# Patient Record
Sex: FEMALE | Race: WHITE | NOT HISPANIC OR LATINO | Employment: UNEMPLOYED | URBAN - METROPOLITAN AREA
[De-identification: names, ages, dates, MRNs, and addresses within clinical notes are randomized per-mention and may not be internally consistent; named-entity substitution may affect disease eponyms.]

---

## 2020-01-06 ENCOUNTER — OFFICE VISIT (OUTPATIENT)
Dept: ENDOCRINOLOGY | Facility: CLINIC | Age: 45
End: 2020-01-06

## 2020-01-06 VITALS
DIASTOLIC BLOOD PRESSURE: 100 MMHG | WEIGHT: 184.2 LBS | SYSTOLIC BLOOD PRESSURE: 150 MMHG | OXYGEN SATURATION: 99 % | HEIGHT: 61 IN | BODY MASS INDEX: 34.78 KG/M2 | HEART RATE: 98 BPM

## 2020-01-06 DIAGNOSIS — Z79.4 TYPE 2 DIABETES MELLITUS WITH OTHER SPECIFIED COMPLICATION, WITH LONG-TERM CURRENT USE OF INSULIN (HCC): Primary | ICD-10-CM

## 2020-01-06 DIAGNOSIS — E78.2 MIXED HYPERLIPIDEMIA: ICD-10-CM

## 2020-01-06 DIAGNOSIS — E11.69 TYPE 2 DIABETES MELLITUS WITH OTHER SPECIFIED COMPLICATION, WITH LONG-TERM CURRENT USE OF INSULIN (HCC): Primary | ICD-10-CM

## 2020-01-06 DIAGNOSIS — E11.319 DIABETIC RETINOPATHY ASSOCIATED WITH TYPE 2 DIABETES MELLITUS, MACULAR EDEMA PRESENCE UNSPECIFIED, UNSPECIFIED LATERALITY, UNSPECIFIED RETINOPATHY SEVERITY (HCC): ICD-10-CM

## 2020-01-06 DIAGNOSIS — Z72.0 TOBACCO ABUSE: ICD-10-CM

## 2020-01-06 PROBLEM — I10 ESSENTIAL HYPERTENSION: Status: ACTIVE | Noted: 2020-01-06

## 2020-01-06 LAB
GLUCOSE BLDC GLUCOMTR-MCNC: 442 MG/DL (ref 70–130)
HBA1C MFR BLD: 12 %

## 2020-01-06 PROCEDURE — 82947 ASSAY GLUCOSE BLOOD QUANT: CPT | Performed by: INTERNAL MEDICINE

## 2020-01-06 PROCEDURE — 83036 HEMOGLOBIN GLYCOSYLATED A1C: CPT | Performed by: INTERNAL MEDICINE

## 2020-01-06 PROCEDURE — 99244 OFF/OP CNSLTJ NEW/EST MOD 40: CPT | Performed by: INTERNAL MEDICINE

## 2020-01-06 RX ORDER — INSULIN GLARGINE 100 [IU]/ML
145 INJECTION, SOLUTION SUBCUTANEOUS
COMMUNITY
Start: 2019-12-11 | End: 2020-01-06 | Stop reason: SDUPTHER

## 2020-01-06 RX ORDER — ESTROGEN,CON/M-PROGEST ACET 0.625-2.5
TABLET ORAL
Status: ON HOLD | COMMUNITY
Start: 2019-12-11 | End: 2022-10-22

## 2020-01-06 RX ORDER — ALBUTEROL SULFATE 90 UG/1
AEROSOL, METERED RESPIRATORY (INHALATION)
Refills: 0 | COMMUNITY
Start: 2019-11-01

## 2020-01-06 RX ORDER — FLASH GLUCOSE SENSOR
1 KIT MISCELLANEOUS ONCE
Qty: 1 DEVICE | Refills: 0 | Status: SHIPPED | OUTPATIENT
Start: 2020-01-06 | End: 2020-01-06

## 2020-01-06 RX ORDER — INSULIN LISPRO 100 U/ML
INJECTION, SOLUTION SUBCUTANEOUS
COMMUNITY
Start: 2019-12-12 | End: 2020-01-06 | Stop reason: SDUPTHER

## 2020-01-06 RX ORDER — OMEPRAZOLE 20 MG/1
CAPSULE, DELAYED RELEASE ORAL
Refills: 0 | Status: ON HOLD | COMMUNITY
Start: 2019-11-01 | End: 2022-10-24 | Stop reason: SDUPTHER

## 2020-01-06 RX ORDER — FENOFIBRATE 160 MG/1
TABLET ORAL
COMMUNITY
Start: 2019-12-11 | End: 2023-04-04

## 2020-01-06 RX ORDER — CYCLOBENZAPRINE HCL 10 MG
TABLET ORAL
COMMUNITY
Start: 2019-12-12 | End: 2022-11-03 | Stop reason: HOSPADM

## 2020-01-06 RX ORDER — INSULIN GLARGINE 100 [IU]/ML
50 INJECTION, SOLUTION SUBCUTANEOUS NIGHTLY
Qty: 6 PEN | Refills: 3 | Status: SHIPPED | OUTPATIENT
Start: 2020-01-06 | End: 2020-06-15

## 2020-01-06 RX ORDER — INSULIN LISPRO 100 U/ML
INJECTION, SOLUTION SUBCUTANEOUS
Qty: 8 PEN | Refills: 3 | Status: SHIPPED | OUTPATIENT
Start: 2020-01-06 | End: 2020-06-15 | Stop reason: SDUPTHER

## 2020-01-06 RX ORDER — FLASH GLUCOSE SENSOR
1 KIT MISCELLANEOUS
Qty: 2 EACH | Refills: 6 | Status: SHIPPED | OUTPATIENT
Start: 2020-01-06 | End: 2020-06-15

## 2020-01-06 RX ORDER — AMLODIPINE BESYLATE 5 MG/1
TABLET ORAL DAILY
Status: ON HOLD | COMMUNITY
Start: 2019-12-11 | End: 2022-10-24 | Stop reason: SDUPTHER

## 2020-01-06 RX ORDER — LANCETS 30 GAUGE
EACH MISCELLANEOUS
Refills: 0 | COMMUNITY
Start: 2019-11-01 | End: 2022-12-15 | Stop reason: SDUPTHER

## 2020-01-06 RX ORDER — GABAPENTIN 600 MG/1
TABLET ORAL
Status: ON HOLD | COMMUNITY
Start: 2019-12-12 | End: 2022-10-22

## 2020-01-06 RX ORDER — LOSARTAN POTASSIUM 50 MG/1
TABLET ORAL DAILY
Status: ON HOLD | COMMUNITY
Start: 2019-12-11 | End: 2022-10-24 | Stop reason: SDUPTHER

## 2020-01-06 RX ORDER — DICLOFENAC SODIUM 75 MG/1
TABLET, DELAYED RELEASE ORAL
Refills: 0 | Status: ON HOLD | COMMUNITY
Start: 2019-11-01 | End: 2022-10-22

## 2020-01-06 NOTE — PATIENT INSTRUCTIONS
Take basaglar 50 units once daily at night.     Take admelog 25 units 15 minutes before meals plus extra if your blood sugar is high.   If BG is 150-199: extra 3 units  -249: extra 6 units  -299: extra 9 units  -349: extra 12 units  +: extra 15 units.     Check your glucose at least 3 times daily. You can take insulin every 4 hours (enough to cover the meal - 25 units - if you're eating plus sliding scale if high; if you are not eating and BG is high, take only the sliding scale).     If in a few weeks your BGs are consistently > 200, call the office.

## 2020-01-06 NOTE — PROGRESS NOTES
Chief Complaint   Patient presents with   • Diabetes     New patient here today with type 2 diabetes        Referring Provider  Braulio Berg MD     HPI   Rachana Patrick is a 44 y.o. female had concerns including Diabetes (New patient here today with type 2 diabetes).    Diabetes was diagnosed 2010.  Complications include retinopathy, neuropathy.   Last ophtho exam was last August.   She doesn't tolerate metformin due to GI issues.   Current medications for diabetes include basaglar, admelog.  She hasn't taken any insulin for the last two weeks. She is worried about taking the prescribed dose therefore she doesn't take it. She takes a max of 45 units of basaglar when she does take it.   She took admelog last yesterday. BG today is 442 - she would typically take 30 units of insulin.   She monitors her blood sugar rarely, maybe once per day at times. Her BGs range 300s-HI.     She needed some intervention for retinopathy but she was afraid to go back and so hasn't. She had to quit her job due to vision issues.   Neuropathy is painful daily.     Last labs were done within a year.   She is not currently on a statin. Doesn't recall if she has been. Is on fenofibrate. Her brother had an MI at age 43 and her mother had an MI age 37.   She had GDM at age 14.    She is working on quitting smoking. Has cut back from 2 ppd.     Past Medical History:   Diagnosis Date   • Hyperlipidemia    • Hypertension    • Type 2 diabetes mellitus (CMS/HCC)      History reviewed. No pertinent surgical history.   Family History   Problem Relation Age of Onset   • Diabetes Mother    • Hypertension Mother    • Heart attack Mother    • Diabetes Father    • Hypertension Father       Social History     Socioeconomic History   • Marital status: Unknown     Spouse name: Not on file   • Number of children: Not on file   • Years of education: Not on file   • Highest education level: Not on file   Tobacco Use   • Smoking status: Current Every Day  "Smoker     Packs/day: 0.50   • Smokeless tobacco: Never Used   Substance and Sexual Activity   • Alcohol use: Yes     Comment: social   • Drug use: Never   • Sexual activity: Defer      No Known Allergies   Current Outpatient Medications on File Prior to Visit   Medication Sig Dispense Refill   • albuterol sulfate  (90 Base) MCG/ACT inhaler INHALE 2 PUFFS PO Q 6 H PRN  0   • amLODIPine (NORVASC) 5 MG tablet Take  by mouth Daily.     • cyclobenzaprine (FLEXERIL) 10 MG tablet TK 1 T PO HS PRN     • diclofenac (VOLTAREN) 75 MG EC tablet TK 1 T PO  BID WF OR MILK  0   • fenofibrate 160 MG tablet TK 1 T PO QD WITH A MEAL     • gabapentin (NEURONTIN) 600 MG tablet TK 1 T PO TID     • Lancets (ONETOUCH DELICA PLUS [id number]) misc USE TO TEST TID AND PRN  0   • losartan (COZAAR) 50 MG tablet Take  by mouth Daily.     • omeprazole (priLOSEC) 20 MG capsule TK 1 C PO QD  0   • ONE TOUCH ULTRA TEST test strip USE TO TEST TID AND PRN     • PREMPRO 0.625-2.5 MG per tablet TK 2 TS PO QD     • [DISCONTINUED] ADMELOG SOLOSTAR 100 UNIT/ML solution pen-injector USE AS DIRECTED ON SLIDING SCALE. MAX OF 50 UNITS PER DAY AS DIRECTED     • [DISCONTINUED] Insulin Glargine (BASAGLAR KWIKPEN) 100 UNIT/ML injection pen 145 Units.       No current facility-administered medications on file prior to visit.         Review of Systems   Constitutional: Negative.    HENT: Negative.    Eyes: Negative.    Respiratory: Negative.    Cardiovascular: Negative.    Gastrointestinal: Negative.    Endocrine: Negative.    Genitourinary: Negative.    Musculoskeletal: Negative.    Skin: Negative.    Allergic/Immunologic: Negative.    Neurological: Negative.    Hematological: Negative.    Psychiatric/Behavioral: Negative.       ROS was reviewed and verified. All other ROS negative.    /100 (BP Location: Right arm, Patient Position: Sitting, Cuff Size: Adult)   Pulse 98   Ht 154.9 cm (61\")   Wt 83.6 kg (184 lb 3.2 oz)   SpO2 99%   Breastfeeding " No   BMI 34.80 kg/m²      Physical Exam    Rachana had a diabetic foot exam performed (Onychomycosis bilateral toes) today.   During the foot exam she had a monofilament test performed (1 out of 5 bilateral feet but significantly diminished).    Constitutional:  well developed; well nourished  no acute distress   ENT/Thyroid: no thyromegaly  no palpable nodules   Eyes: EOM intact  Conjunctiva: clear   Respiratory:  breathing is unlabored  clear to auscultation bilaterally   Cardiovascular:  regular rate and rhythm, S1, S2 normal, no murmur, click, rub or gallop   Chest:  Not performed.   Abdomen: Not performed.   : Not performed.   Musculoskeletal: negative findings:  ROM of all joints is normal, no deformities present   Skin: dry and warm   Neuro: normal without focal findings, mental status, speech normal, alert and oriented x3 and LOUIE   Psych: oriented to time, place and person, mood and affect are within normal limits     HbA1c:  Lab Results   Component Value Date    HGBA1C 12.0 01/06/2020     Glucose:  Lab Results   Component Value Date    POCGLU 442 (A) 01/06/2020     Assessment and Plan    Rachana was seen today for diabetes.    Diagnoses and all orders for this visit:    Type 2 diabetes mellitus with other specified complication, with long-term current use of insulin (CMS/[facility])  Uncontrolled with A1c up to 12.0 today.  BG in the office 442 and patient was given 20 units of fast acting insulin.  Complicated by neuropathy and retinopathy.  Patient has not been taking insulin as prescribed. Has never taken more than 45 units of Basaglar at night.  She is also taking her prandial insulin after meals rather than before.  Increase Basaglar from her usual 45 units at night to 50 units nightly.  Change Admelog to 25 units q. AC +3: 50 greater than 150 correction scale.  Check BG's 3-4 times daily and call the office if persistently greater than 180 for titration of insulin.  No metformin due to history of GI  intolerance.  Consider SGLT2 inhibitor in the future once her BG's are better controlled.  She is too high of a risk for UTI or yeast infection from this medication currently, but with her family history of early CAD, this would be a good medication to add to her regimen.  Her ophtho exam is up-to-date, but she needs intervention for diabetic retinopathy.  Refer for diabetes education.  -     POC Glucose  -     POC Glycosylated Hemoglobin (Hb A1C)  -     Insulin Glargine (BASAGLAR KWIKPEN) 100 UNIT/ML injection pen; Inject 50 Units under the skin into the appropriate area as directed Every Night.  -     ADMELOG SOLOSTAR 100 UNIT/ML solution pen-injector; 25 units three times daily before meals plus 3 extra units for every 50 points your BG is >150, max dose 40 units  -     Continuous Blood Gluc Sensor (treadalongSTYLE COLIN 14 DAY SENSOR) misc; 1 each Every 14 (Fourteen) Days.  -     Continuous Blood Gluc  (FREESTYLE COLIN READER) device; 1 each 1 (One) Time for 1 dose.  -     Comprehensive Metabolic Panel  -     TSH  -     Microalbumin / Creatinine Urine Ratio - Urine, Clean Catch  -     CBC (No Diff)  -     Ambulatory Referral to Diabetic Education    Diabetic retinopathy associated with type 2 diabetes mellitus, macular edema presence unspecified, unspecified laterality, unspecified retinopathy severity (CMS/HCC)  Is imperative that the patient get follow-up for diabetic retinopathy and treatment with hopes to preserve her vision and prevent progression of retinopathy. BG management as above.    Mixed hyperlipidemia  All type II diabetics should be on statin therapy due to increased CVD risk.  This patient has particularly high CVD risk due to early family history.  -     Lipid Panel    Tobacco abuse  Cessation advised.     Return in about 3 months (around 4/6/2020) for next scheduled follow up. The patient was instructed to contact the clinic with any interval questions or concerns.    Yuki Hughes, DO    Endocrinologist    Please note that portions of this document were completed with a voice recognition program. Efforts were made to edit the dictations, but occasionally words are mis-transcribed.

## 2020-06-15 ENCOUNTER — OFFICE VISIT (OUTPATIENT)
Dept: ENDOCRINOLOGY | Facility: CLINIC | Age: 45
End: 2020-06-15

## 2020-06-15 VITALS
HEIGHT: 61 IN | BODY MASS INDEX: 34.4 KG/M2 | SYSTOLIC BLOOD PRESSURE: 142 MMHG | DIASTOLIC BLOOD PRESSURE: 90 MMHG | HEART RATE: 102 BPM | WEIGHT: 182.2 LBS | OXYGEN SATURATION: 99 %

## 2020-06-15 DIAGNOSIS — Z79.4 TYPE 2 DIABETES MELLITUS WITH OTHER SPECIFIED COMPLICATION, WITH LONG-TERM CURRENT USE OF INSULIN (HCC): Primary | ICD-10-CM

## 2020-06-15 DIAGNOSIS — E66.09 CLASS 1 OBESITY DUE TO EXCESS CALORIES WITH SERIOUS COMORBIDITY AND BODY MASS INDEX (BMI) OF 34.0 TO 34.9 IN ADULT: ICD-10-CM

## 2020-06-15 DIAGNOSIS — E78.2 MIXED HYPERLIPIDEMIA: ICD-10-CM

## 2020-06-15 DIAGNOSIS — G47.00 INSOMNIA, UNSPECIFIED TYPE: ICD-10-CM

## 2020-06-15 DIAGNOSIS — E11.69 TYPE 2 DIABETES MELLITUS WITH OTHER SPECIFIED COMPLICATION, WITH LONG-TERM CURRENT USE OF INSULIN (HCC): Primary | ICD-10-CM

## 2020-06-15 PROBLEM — E66.811 CLASS 1 OBESITY DUE TO EXCESS CALORIES WITH SERIOUS COMORBIDITY AND BODY MASS INDEX (BMI) OF 34.0 TO 34.9 IN ADULT: Status: ACTIVE | Noted: 2020-06-15

## 2020-06-15 LAB
GLUCOSE BLDC GLUCOMTR-MCNC: 217 MG/DL (ref 70–130)
HBA1C MFR BLD: 12.7 %

## 2020-06-15 PROCEDURE — 82947 ASSAY GLUCOSE BLOOD QUANT: CPT | Performed by: INTERNAL MEDICINE

## 2020-06-15 PROCEDURE — 83036 HEMOGLOBIN GLYCOSYLATED A1C: CPT | Performed by: INTERNAL MEDICINE

## 2020-06-15 PROCEDURE — 99214 OFFICE O/P EST MOD 30 MIN: CPT | Performed by: INTERNAL MEDICINE

## 2020-06-15 RX ORDER — INSULIN LISPRO 100 U/ML
INJECTION, SOLUTION SUBCUTANEOUS
Qty: 40 PEN | Refills: 1 | Status: SHIPPED | OUTPATIENT
Start: 2020-06-15 | End: 2022-11-08

## 2020-06-15 RX ORDER — CETIRIZINE HYDROCHLORIDE 10 MG/1
TABLET ORAL DAILY
COMMUNITY
Start: 2020-05-20 | End: 2022-11-03 | Stop reason: HOSPADM

## 2020-06-15 RX ORDER — INSULIN GLARGINE 100 [IU]/ML
80 INJECTION, SOLUTION SUBCUTANEOUS NIGHTLY
Qty: 25 PEN | Refills: 1 | Status: SHIPPED | OUTPATIENT
Start: 2020-06-15 | End: 2020-06-15

## 2020-06-15 RX ORDER — INSULIN GLARGINE 100 [IU]/ML
60 INJECTION, SOLUTION SUBCUTANEOUS NIGHTLY
Qty: 25 PEN | Refills: 1 | Status: ON HOLD | OUTPATIENT
Start: 2020-06-15 | End: 2022-10-22

## 2020-06-15 NOTE — PROGRESS NOTES
Chief Complaint   Patient presents with   • Diabetes     Follow up Type 2 Diabetes        HPI   Rachana Patrick is a 45 y.o. female had concerns including Diabetes (Follow up Type 2 Diabetes).    She is checking blood sugars up to 6 times per day. BGs ranging 200s-600s. AM BGs are the highest.   Current medications for diabetes include admelog 20 units before meals and will take more an hour or more later if high (ranging 20-30 more units if BG 300s+), basaglar 40 units at night.  Estimates she takes roughly 130 units of admelog per day.     A1c today is 12.7 from 12.0 at her last visit.   No personal history of pancreatitis.     She should be on statin therapy due to DM2 and early CAD in her family. Is due for labs to ensure LFTs are stable prior to initiation statin therapy.     Has poor sleep - wakes up after an hour and cannot go back to sleep.  Has not been told if she snores at night.  Unsure if she may have sleep apnea.  May have RLS. Moves legs constantly.     Difficulty with weight loss.  Reports she may was a few pounds and then regains it slowly.  She is not sure where the additional calories are coming from.  [place] like her diet is not too bad.  She has discontinued regular soda.    The following portions of the patient's history were reviewed and updated as appropriate: allergies, current medications, past family history, past medical history, past social history, past surgical history and problem list.    Review of Systems   Constitutional: Positive for fatigue.   HENT: Negative.    Eyes: Negative.    Respiratory: Negative.    Cardiovascular: Negative.    Gastrointestinal: Negative.    Endocrine: Positive for polydipsia.   Genitourinary: Negative.    Musculoskeletal: Negative.    Skin: Negative.         Nonhealing scab on left cheek   Allergic/Immunologic: Negative.    Neurological: Negative.    Hematological: Negative.    Psychiatric/Behavioral: Positive for sleep disturbance.      ROS was reviewed  "and verified. All other ROS negative.    /90 (BP Location: Right arm, Patient Position: Sitting, Cuff Size: Adult)   Pulse 102   Ht 154.9 cm (61\")   Wt 82.6 kg (182 lb 3.2 oz)   SpO2 99%   Breastfeeding No   BMI 34.43 kg/m²      Physical Exam     Constitutional:  well developed; well nourished  no acute distress  obese - Body mass index is 34.43 kg/m².   ENT/Thyroid: no thyromegaly  no palpable nodules   Eyes: EOM intact  Conjunctiva: clear   Respiratory:  breathing is unlabored  clear to auscultation bilaterally   Cardiovascular:  regular rate and rhythm, S1, S2 normal, no murmur, click, rub or gallop   Chest:  Not performed.   Abdomen: Not performed.   : Not performed.   Musculoskeletal: negative findings:  ROM of all joints is normal, no deformities present   Skin: dry and warm   Neuro: normal without focal findings, mental status, speech normal, alert and oriented x3 and LOUIE   Psych: oriented to time, place and person, mood and affect are within normal limits     HbA1c:  Lab Results   Component Value Date    HGBA1C 12.7 06/15/2020    HGBA1C 12.0 01/06/2020     Glucose:    Lab Results   Component Value Date    POCGLU 217 (A) 06/15/2020       Assessment and Plan    Rachana was seen today for diabetes.    Diagnoses and all orders for this visit:    Type 2 diabetes mellitus with other specified complication, with long-term current use of insulin (CMS/[facility])  Uncontrolled with A1c up to 12.7 today.  Complicated by retinopathy and neuropathy.  Patient is not taking insulin as directed at her last visit.  No metformin due to history of GI intolerance  Increase Basaglar from 40 to 60 units nightly.  Take Admelog 25 units before meals +3: 50 greater than 150 correction.  Bring BG meter to all visits.  Correction insulin needs to be taken before meals rather than an hour to do after she is doing currently.  Call the office if BG's are persistently above 200.  Add Ozempic (or alternate GLP-1 agonist " covered).  Patient has no personal history of pancreatitis and no family history of MEN or MTC.  Cautioned regarding possible GI side effects. Follow-up with Ophtho as below.  Labs are due and an order was given.  Ophtho-exam is due and patient was encouraged to schedule as she previously had retinopathy which needed treatment, but she did not follow-up.  Monofilament was updated in January.  -     POC Glycosylated Hemoglobin (Hb A1C)  -     POC Glucose  -     Discontinue: Insulin Glargine (BASAGLAR KWIKPEN) 100 UNIT/ML injection pen; Inject 80 Units under the skin into the appropriate area as directed Every Night.  -     ADMELOG SOLOSTAR 100 UNIT/ML solution pen-injector; 25 units three times daily before meals plus 3 extra units for every 50 points your BG is >150,  units  -     CBC (No Diff); Future  -     Comprehensive Metabolic Panel; Future  -     Lipid Panel; Future  -     Microalbumin / Creatinine Urine Ratio - Urine, Clean Catch; Future  -     TSH; Future  -     Semaglutide,0.25 or 0.5MG/DOS, (Ozempic, 0.25 or 0.5 MG/DOSE,) 2 MG/1.5ML solution pen-injector; Inject 0.25 mg under the skin into the appropriate area as directed 1 (One) Time Per Week. Increase to 0.5 mg weekly after 4 weeks  -     Insulin Glargine (BASAGLAR KWIKPEN) 100 UNIT/ML injection pen; Inject 60 Units under the skin into the appropriate area as directed Every Night. Titrate up to 80 units if needed for fasting BG <180    Mixed hyperlipidemia  History of hyperlipidemia and patient is on fenofibrate.  Due to her family history of early CAD and type 2 diabetes, statin therapy is indicated.  Need labs prior to initiating.  Lab order was given today.    Insomnia, unspecified type  Possible RLS, ANGELIA, insomnia.  Refer for sleep evaluation.  -     Ambulatory Referral to Sleep Medicine    Class 1 obesity due to excess calories with serious comorbidity and body mass index (BMI) of 34.0 to 34.9 in adult  BMI 34.4.  Weight loss is needed for  overall health and diabetes management.  Recommend that she keep a food log.  I am hoping the addition of Ozempic or alternate GLP-1 agonist will aid in weight loss.       **Addendum 6/25/2020:    We received a denial from her insurance for Ozempic stating she had to try at least 1 of the following agents from each group: Group 1: Sulfonylurea, TZD, DPP 4 inhibitor, SGLT2 inhibitor  AND  Group 2: Victoza or Bydureon.    Patient has not tried any of the above medications.  We will try her on Januvia and if BG's are not adequately controlled and will transition to Victoza.  Notified the patient and she will call us with BG logs within a few weeks.    **Addendum 7/1/2020:    Got another denial for Januvia stating she had to have tried and failed alogliptin and metformin. She has GI intolerance to metformin. Alogliptin script sent.     Return in about 3 months (around 9/15/2020) for next scheduled follow up. The patient was instructed to contact the clinic with any interval questions or concerns.    Yuki Hughes, DO   Endocrinologist    Please note that portions of this document were completed with a voice recognition program. Efforts were made to edit the dictations, but occasionally words are mis-transcribed.

## 2020-06-15 NOTE — PATIENT INSTRUCTIONS
Increase basaglar to 60 units at night.     Take admelog 25 units before meals plus extra if your blood sugar is high.   If BG is 150-199: extra 3 units  -249: extra 6 units  -299: extra 9 units  -349: extra 12 units  +: extra 15 units.     ** Call the office if your blood sugars are consistently above 200.

## 2020-06-16 ENCOUNTER — TELEPHONE (OUTPATIENT)
Dept: ENDOCRINOLOGY | Facility: CLINIC | Age: 45
End: 2020-06-16

## 2020-06-16 NOTE — TELEPHONE ENCOUNTER
PER YESTERDAY'S VISIT, PATIENT IS REQUESTING A REFERRAL TO DERMATOLOGY. SHE HAS [facility] INSURANCE WHICH REQUIRES A REFERRAL AND IS UNSURE OF WHICH OFFICES WOULD TAKE HER INSURANCE..    CALL BACK [phone]

## 2020-06-17 DIAGNOSIS — L98.9 SKIN LESION OF FACE: Primary | ICD-10-CM

## 2020-06-22 ENCOUNTER — PRIOR AUTHORIZATION (OUTPATIENT)
Dept: ENDOCRINOLOGY | Facility: CLINIC | Age: 45
End: 2020-06-22

## 2020-06-22 DIAGNOSIS — E11.69 TYPE 2 DIABETES MELLITUS WITH OTHER SPECIFIED COMPLICATION, WITH LONG-TERM CURRENT USE OF INSULIN (HCC): ICD-10-CM

## 2020-06-22 DIAGNOSIS — Z79.4 TYPE 2 DIABETES MELLITUS WITH OTHER SPECIFIED COMPLICATION, WITH LONG-TERM CURRENT USE OF INSULIN (HCC): ICD-10-CM

## 2020-06-22 RX ORDER — INSULIN LISPRO 100 U/ML
INJECTION, SOLUTION SUBCUTANEOUS
Qty: 36 ML | OUTPATIENT
Start: 2020-06-22

## 2020-06-23 RX ORDER — INSULIN LISPRO 100 U/ML
INJECTION, SOLUTION SUBCUTANEOUS
Qty: 40 PEN | Refills: 1 | Status: ON HOLD | OUTPATIENT
Start: 2020-06-23 | End: 2022-10-22

## 2020-06-25 DIAGNOSIS — E11.69 TYPE 2 DIABETES MELLITUS WITH OTHER SPECIFIED COMPLICATION, WITH LONG-TERM CURRENT USE OF INSULIN (HCC): Primary | ICD-10-CM

## 2020-06-25 DIAGNOSIS — Z79.4 TYPE 2 DIABETES MELLITUS WITH OTHER SPECIFIED COMPLICATION, WITH LONG-TERM CURRENT USE OF INSULIN (HCC): Primary | ICD-10-CM

## 2020-06-29 ENCOUNTER — TELEPHONE (OUTPATIENT)
Dept: ENDOCRINOLOGY | Facility: CLINIC | Age: 45
End: 2020-06-29

## 2020-06-29 NOTE — TELEPHONE ENCOUNTER
PATIENT STATES INSURANCE IS NOT COVERING HER JUANUVIA MEDICATION. SHE NEEDS US TO FIND OUT WHAT THEY NEED IN ORDER FOR THIS TO BE COVERED OR FIND MEDICATION THAT IS COVERED BY INSURANCE. PATIENTS NUMBER [phone]

## 2020-07-01 DIAGNOSIS — E11.69 TYPE 2 DIABETES MELLITUS WITH OTHER SPECIFIED COMPLICATION, WITH LONG-TERM CURRENT USE OF INSULIN (HCC): Primary | ICD-10-CM

## 2020-07-01 DIAGNOSIS — Z79.4 TYPE 2 DIABETES MELLITUS WITH OTHER SPECIFIED COMPLICATION, WITH LONG-TERM CURRENT USE OF INSULIN (HCC): Primary | ICD-10-CM

## 2020-07-01 RX ORDER — ALOGLIPTIN 25 MG/1
25 TABLET, FILM COATED ORAL DAILY
Qty: 30 TABLET | Refills: 5 | Status: SHIPPED | OUTPATIENT
Start: 2020-07-01 | End: 2020-07-06 | Stop reason: SDUPTHER

## 2020-07-02 ENCOUNTER — TELEPHONE (OUTPATIENT)
Dept: ENDOCRINOLOGY | Facility: CLINIC | Age: 45
End: 2020-07-02

## 2020-07-06 DIAGNOSIS — Z79.4 TYPE 2 DIABETES MELLITUS WITH OTHER SPECIFIED COMPLICATION, WITH LONG-TERM CURRENT USE OF INSULIN (HCC): ICD-10-CM

## 2020-07-06 DIAGNOSIS — E11.69 TYPE 2 DIABETES MELLITUS WITH OTHER SPECIFIED COMPLICATION, WITH LONG-TERM CURRENT USE OF INSULIN (HCC): ICD-10-CM

## 2020-07-06 RX ORDER — ALOGLIPTIN 25 MG/1
25 TABLET, FILM COATED ORAL DAILY
Qty: 30 TABLET | Refills: 5 | Status: SHIPPED | OUTPATIENT
Start: 2020-07-06 | End: 2022-11-08

## 2020-07-29 ENCOUNTER — PRIOR AUTHORIZATION (OUTPATIENT)
Dept: ENDOCRINOLOGY | Facility: CLINIC | Age: 45
End: 2020-07-29

## 2020-08-05 ENCOUNTER — TELEPHONE (OUTPATIENT)
Dept: ENDOCRINOLOGY | Facility: CLINIC | Age: 45
End: 2020-08-05

## 2020-08-05 NOTE — TELEPHONE ENCOUNTER
No, she should be taking only one or the other. Insurance would not approve danieluvia initially so we sent a script for alogliptin. I am ok with her taking either one - but not both together.

## 2020-08-06 NOTE — TELEPHONE ENCOUNTER
PATIENT CALLED, INFORMED PATIENT OF DR REZA'S MESSAGE. PATIENT VERBALIZED UNDERSTANDING. SHE STATES THAT INSURANCE DID APPROVE JANUVIA AND SHE WILL TAKE THAT TO SEE HOW SHE DOES ON IT.

## 2020-11-09 DIAGNOSIS — E11.69 TYPE 2 DIABETES MELLITUS WITH OTHER SPECIFIED COMPLICATION, WITH LONG-TERM CURRENT USE OF INSULIN (HCC): ICD-10-CM

## 2020-11-09 DIAGNOSIS — Z79.4 TYPE 2 DIABETES MELLITUS WITH OTHER SPECIFIED COMPLICATION, WITH LONG-TERM CURRENT USE OF INSULIN (HCC): ICD-10-CM

## 2020-11-11 RX ORDER — INSULIN GLARGINE 100 [IU]/ML
60 INJECTION, SOLUTION SUBCUTANEOUS NIGHTLY
Qty: 25 PEN | Refills: 1 | OUTPATIENT
Start: 2020-11-11

## 2020-11-12 NOTE — TELEPHONE ENCOUNTER
Patient called back in regards to appointment needed per Yuki Hughes, DO before medications can be filled. Is there anyway we can get her in with Maciej Nathan PA-C? Please give patient a call back.       Patient is currently out of all her medications.

## 2021-01-20 RX ORDER — BLOOD SUGAR DIAGNOSTIC
STRIP MISCELLANEOUS
OUTPATIENT
Start: 2021-01-20

## 2021-01-20 NOTE — TELEPHONE ENCOUNTER
Last office visit 06/15/2020.  Cancelled 07/27/2020 appointment.  No show to 08/13/2020 and 11/23/2020 appointment.  No follow up scheduled.

## 2021-04-29 RX ORDER — BLOOD SUGAR DIAGNOSTIC
STRIP MISCELLANEOUS
Qty: 300 EACH | Refills: 1 | OUTPATIENT
Start: 2021-04-29

## 2021-10-26 DIAGNOSIS — E11.69 TYPE 2 DIABETES MELLITUS WITH OTHER SPECIFIED COMPLICATION, WITH LONG-TERM CURRENT USE OF INSULIN (HCC): ICD-10-CM

## 2021-10-26 DIAGNOSIS — Z79.4 TYPE 2 DIABETES MELLITUS WITH OTHER SPECIFIED COMPLICATION, WITH LONG-TERM CURRENT USE OF INSULIN (HCC): ICD-10-CM

## 2021-10-26 RX ORDER — SITAGLIPTIN 100 MG/1
TABLET, FILM COATED ORAL
Qty: 30 TABLET | Refills: 5 | OUTPATIENT
Start: 2021-10-26

## 2021-10-26 RX ORDER — ALOGLIPTIN 25 MG/1
TABLET, FILM COATED ORAL
Qty: 30 TABLET | Refills: 5 | OUTPATIENT
Start: 2021-10-26

## 2021-10-26 RX ORDER — SEMAGLUTIDE 1.34 MG/ML
INJECTION, SOLUTION SUBCUTANEOUS
Qty: 1.5 ML | OUTPATIENT
Start: 2021-10-26

## 2022-10-22 ENCOUNTER — APPOINTMENT (OUTPATIENT)
Dept: CT IMAGING | Facility: HOSPITAL | Age: 47
End: 2022-10-22

## 2022-10-22 ENCOUNTER — HOSPITAL ENCOUNTER (INPATIENT)
Facility: HOSPITAL | Age: 47
LOS: 2 days | Discharge: HOME | End: 2022-10-24
Attending: EMERGENCY MEDICINE | Admitting: INTERNAL MEDICINE

## 2022-10-22 ENCOUNTER — APPOINTMENT (OUTPATIENT)
Dept: GENERAL RADIOLOGY | Facility: HOSPITAL | Age: 47
End: 2022-10-22

## 2022-10-22 DIAGNOSIS — R41.82 ALTERED MENTAL STATUS, UNSPECIFIED ALTERED MENTAL STATUS TYPE: ICD-10-CM

## 2022-10-22 DIAGNOSIS — I63.9 CEREBROVASCULAR ACCIDENT (CVA), UNSPECIFIED MECHANISM: Primary | ICD-10-CM

## 2022-10-22 DIAGNOSIS — N39.0 URINARY TRACT INFECTION WITHOUT HEMATURIA, SITE UNSPECIFIED: ICD-10-CM

## 2022-10-22 DIAGNOSIS — E16.2 HYPOGLYCEMIA: ICD-10-CM

## 2022-10-22 DIAGNOSIS — I65.09 OCCLUSION OF VERTEBRAL ARTERY, UNSPECIFIED LATERALITY: ICD-10-CM

## 2022-10-22 DIAGNOSIS — H53.40 VISUAL FIELD DEFECT: ICD-10-CM

## 2022-10-22 PROBLEM — E11.9 TYPE 2 DIABETES MELLITUS: Status: ACTIVE | Noted: 2022-10-22

## 2022-10-22 LAB
ALBUMIN SERPL-MCNC: 4.5 G/DL (ref 3.5–5.2)
ALBUMIN/GLOB SERPL: 1.1 G/DL
ALP SERPL-CCNC: 134 U/L (ref 39–117)
ALT SERPL W P-5'-P-CCNC: 54 U/L (ref 1–33)
AMPHET+METHAMPHET UR QL: NEGATIVE
AMPHETAMINES UR QL: NEGATIVE
ANION GAP SERPL CALCULATED.3IONS-SCNC: 13.9 MMOL/L (ref 5–15)
AST SERPL-CCNC: 59 U/L (ref 1–32)
BACTERIA UR QL AUTO: ABNORMAL /HPF
BARBITURATES UR QL SCN: NEGATIVE
BASOPHILS # BLD AUTO: 0.11 10*3/MM3 (ref 0–0.2)
BASOPHILS NFR BLD AUTO: 0.7 % (ref 0–1.5)
BENZODIAZ UR QL SCN: NEGATIVE
BILIRUB SERPL-MCNC: 0.5 MG/DL (ref 0–1.2)
BILIRUB UR QL STRIP: NEGATIVE
BUN SERPL-MCNC: 6 MG/DL (ref 6–20)
BUN/CREAT SERPL: 6.4 (ref 7–25)
BUPRENORPHINE SERPL-MCNC: NEGATIVE NG/ML
CALCIUM SPEC-SCNC: 9.7 MG/DL (ref 8.6–10.5)
CANNABINOIDS SERPL QL: NEGATIVE
CHLORIDE SERPL-SCNC: 101 MMOL/L (ref 98–107)
CLARITY UR: CLEAR
CO2 SERPL-SCNC: 26.1 MMOL/L (ref 22–29)
COCAINE UR QL: NEGATIVE
COLOR UR: YELLOW
CREAT SERPL-MCNC: 0.94 MG/DL (ref 0.57–1)
DEPRECATED RDW RBC AUTO: 41.1 FL (ref 37–54)
EGFRCR SERPLBLD CKD-EPI 2021: 74.5 ML/MIN/1.73
EOSINOPHIL # BLD AUTO: 0.29 10*3/MM3 (ref 0–0.4)
EOSINOPHIL NFR BLD AUTO: 1.7 % (ref 0.3–6.2)
ERYTHROCYTE [DISTWIDTH] IN BLOOD BY AUTOMATED COUNT: 12.8 % (ref 12.3–15.4)
GLOBULIN UR ELPH-MCNC: 4.2 GM/DL
GLUCOSE BLDC GLUCOMTR-MCNC: 248 MG/DL (ref 70–130)
GLUCOSE BLDC GLUCOMTR-MCNC: 70 MG/DL (ref 70–130)
GLUCOSE SERPL-MCNC: 58 MG/DL (ref 65–99)
GLUCOSE UR STRIP-MCNC: ABNORMAL MG/DL
HBA1C MFR BLD: 9.9 % (ref 4.8–5.6)
HCT VFR BLD AUTO: 48.5 % (ref 34–46.6)
HGB BLD-MCNC: 16.7 G/DL (ref 12–15.9)
HGB UR QL STRIP.AUTO: NEGATIVE
HYALINE CASTS UR QL AUTO: ABNORMAL /LPF
IMM GRANULOCYTES # BLD AUTO: 0.08 10*3/MM3 (ref 0–0.05)
IMM GRANULOCYTES NFR BLD AUTO: 0.5 % (ref 0–0.5)
KETONES UR QL STRIP: NEGATIVE
LEUKOCYTE ESTERASE UR QL STRIP.AUTO: NEGATIVE
LIPASE SERPL-CCNC: 29 U/L (ref 13–60)
LYMPHOCYTES # BLD AUTO: 5.4 10*3/MM3 (ref 0.7–3.1)
LYMPHOCYTES NFR BLD AUTO: 32 % (ref 19.6–45.3)
MAGNESIUM SERPL-MCNC: 2.3 MG/DL (ref 1.6–2.6)
MCH RBC QN AUTO: 30.1 PG (ref 26.6–33)
MCHC RBC AUTO-ENTMCNC: 34.4 G/DL (ref 31.5–35.7)
MCV RBC AUTO: 87.4 FL (ref 79–97)
METHADONE UR QL SCN: NEGATIVE
MONOCYTES # BLD AUTO: 0.92 10*3/MM3 (ref 0.1–0.9)
MONOCYTES NFR BLD AUTO: 5.5 % (ref 5–12)
NEUTROPHILS NFR BLD AUTO: 10.08 10*3/MM3 (ref 1.7–7)
NEUTROPHILS NFR BLD AUTO: 59.6 % (ref 42.7–76)
NITRITE UR QL STRIP: POSITIVE
NRBC BLD AUTO-RTO: 0 /100 WBC (ref 0–0.2)
NT-PROBNP SERPL-MCNC: 171.1 PG/ML (ref 0–450)
OPIATES UR QL: NEGATIVE
OXYCODONE UR QL SCN: NEGATIVE
PCP UR QL SCN: NEGATIVE
PH UR STRIP.AUTO: <=5 [PH] (ref 5–8)
PLATELET # BLD AUTO: 292 10*3/MM3 (ref 140–450)
PMV BLD AUTO: 11.2 FL (ref 6–12)
POTASSIUM SERPL-SCNC: 3 MMOL/L (ref 3.5–5.2)
PROPOXYPH UR QL: NEGATIVE
PROT SERPL-MCNC: 8.7 G/DL (ref 6–8.5)
PROT UR QL STRIP: NEGATIVE
RBC # BLD AUTO: 5.55 10*6/MM3 (ref 3.77–5.28)
RBC # UR STRIP: ABNORMAL /HPF
RBC MORPH BLD: NORMAL
REF LAB TEST METHOD: ABNORMAL
SMALL PLATELETS BLD QL SMEAR: ADEQUATE
SODIUM SERPL-SCNC: 141 MMOL/L (ref 136–145)
SP GR UR STRIP: >=1.03 (ref 1–1.03)
SQUAMOUS #/AREA URNS HPF: ABNORMAL /HPF
TRICYCLICS UR QL SCN: NEGATIVE
TROPONIN T SERPL-MCNC: <0.01 NG/ML (ref 0–0.03)
UROBILINOGEN UR QL STRIP: ABNORMAL
WBC # UR STRIP: ABNORMAL /HPF
WBC MORPH BLD: NORMAL
WBC NRBC COR # BLD: 16.88 10*3/MM3 (ref 3.4–10.8)

## 2022-10-22 PROCEDURE — 70498 CT ANGIOGRAPHY NECK: CPT

## 2022-10-22 PROCEDURE — 70450 CT HEAD/BRAIN W/O DYE: CPT

## 2022-10-22 PROCEDURE — 25010000002 CEFTRIAXONE SODIUM-DEXTROSE 1-3.74 GM-%(50ML) RECONSTITUTED SOLUTION: Performed by: EMERGENCY MEDICINE

## 2022-10-22 PROCEDURE — 83036 HEMOGLOBIN GLYCOSYLATED A1C: CPT | Performed by: NURSE PRACTITIONER

## 2022-10-22 PROCEDURE — 85007 BL SMEAR W/DIFF WBC COUNT: CPT | Performed by: EMERGENCY MEDICINE

## 2022-10-22 PROCEDURE — 83690 ASSAY OF LIPASE: CPT | Performed by: EMERGENCY MEDICINE

## 2022-10-22 PROCEDURE — 84484 ASSAY OF TROPONIN QUANT: CPT | Performed by: EMERGENCY MEDICINE

## 2022-10-22 PROCEDURE — 70496 CT ANGIOGRAPHY HEAD: CPT

## 2022-10-22 PROCEDURE — 63710000001 INSULIN DETEMIR PER 5 UNITS: Performed by: NURSE PRACTITIONER

## 2022-10-22 PROCEDURE — 71045 X-RAY EXAM CHEST 1 VIEW: CPT

## 2022-10-22 PROCEDURE — 82962 GLUCOSE BLOOD TEST: CPT

## 2022-10-22 PROCEDURE — 0042T HC CT CEREBRAL PERFUSION W/WO CONTRAST: CPT

## 2022-10-22 PROCEDURE — 83735 ASSAY OF MAGNESIUM: CPT | Performed by: EMERGENCY MEDICINE

## 2022-10-22 PROCEDURE — 36415 COLL VENOUS BLD VENIPUNCTURE: CPT

## 2022-10-22 PROCEDURE — 99285 EMERGENCY DEPT VISIT HI MDM: CPT

## 2022-10-22 PROCEDURE — 83880 ASSAY OF NATRIURETIC PEPTIDE: CPT | Performed by: EMERGENCY MEDICINE

## 2022-10-22 PROCEDURE — 99223 1ST HOSP IP/OBS HIGH 75: CPT | Performed by: NURSE PRACTITIONER

## 2022-10-22 PROCEDURE — 80053 COMPREHEN METABOLIC PANEL: CPT | Performed by: EMERGENCY MEDICINE

## 2022-10-22 PROCEDURE — 81001 URINALYSIS AUTO W/SCOPE: CPT | Performed by: EMERGENCY MEDICINE

## 2022-10-22 PROCEDURE — 93005 ELECTROCARDIOGRAM TRACING: CPT | Performed by: EMERGENCY MEDICINE

## 2022-10-22 PROCEDURE — 80306 DRUG TEST PRSMV INSTRMNT: CPT | Performed by: EMERGENCY MEDICINE

## 2022-10-22 PROCEDURE — 85025 COMPLETE CBC W/AUTO DIFF WBC: CPT | Performed by: EMERGENCY MEDICINE

## 2022-10-22 PROCEDURE — 25010000002 IOPAMIDOL 61 % SOLUTION: Performed by: EMERGENCY MEDICINE

## 2022-10-22 RX ORDER — NICOTINE POLACRILEX 4 MG
15 LOZENGE BUCCAL
Status: DISCONTINUED | OUTPATIENT
Start: 2022-10-22 | End: 2022-10-24 | Stop reason: HOSPADM

## 2022-10-22 RX ORDER — ACETAMINOPHEN 325 MG/1
650 TABLET ORAL EVERY 4 HOURS PRN
Status: DISCONTINUED | OUTPATIENT
Start: 2022-10-22 | End: 2022-10-24 | Stop reason: HOSPADM

## 2022-10-22 RX ORDER — SODIUM CHLORIDE 0.9 % (FLUSH) 0.9 %
10 SYRINGE (ML) INJECTION AS NEEDED
Status: DISCONTINUED | OUTPATIENT
Start: 2022-10-22 | End: 2022-10-24 | Stop reason: HOSPADM

## 2022-10-22 RX ORDER — NICOTINE 21 MG/24HR
1 PATCH, TRANSDERMAL 24 HOURS TRANSDERMAL EVERY 24 HOURS
Status: DISCONTINUED | OUTPATIENT
Start: 2022-10-22 | End: 2022-10-24 | Stop reason: HOSPADM

## 2022-10-22 RX ORDER — POTASSIUM CHLORIDE 750 MG/1
40 CAPSULE, EXTENDED RELEASE ORAL EVERY 4 HOURS
Status: COMPLETED | OUTPATIENT
Start: 2022-10-22 | End: 2022-10-23

## 2022-10-22 RX ORDER — ONDANSETRON 4 MG/1
4 TABLET, FILM COATED ORAL EVERY 6 HOURS PRN
Status: DISCONTINUED | OUTPATIENT
Start: 2022-10-22 | End: 2022-10-24 | Stop reason: HOSPADM

## 2022-10-22 RX ORDER — ONDANSETRON 2 MG/ML
4 INJECTION INTRAMUSCULAR; INTRAVENOUS EVERY 6 HOURS PRN
Status: DISCONTINUED | OUTPATIENT
Start: 2022-10-22 | End: 2022-10-24 | Stop reason: HOSPADM

## 2022-10-22 RX ORDER — ENOXAPARIN SODIUM 100 MG/ML
40 INJECTION SUBCUTANEOUS NIGHTLY
Status: DISCONTINUED | OUTPATIENT
Start: 2022-10-22 | End: 2022-10-24 | Stop reason: HOSPADM

## 2022-10-22 RX ORDER — AMLODIPINE BESYLATE 5 MG/1
5 TABLET ORAL DAILY
Status: DISCONTINUED | OUTPATIENT
Start: 2022-10-22 | End: 2022-10-24

## 2022-10-22 RX ORDER — INSULIN ASPART 100 [IU]/ML
0-9 INJECTION, SOLUTION INTRAVENOUS; SUBCUTANEOUS
Status: DISCONTINUED | OUTPATIENT
Start: 2022-10-22 | End: 2022-10-24 | Stop reason: HOSPADM

## 2022-10-22 RX ORDER — PANTOPRAZOLE SODIUM 40 MG/1
40 TABLET, DELAYED RELEASE ORAL EVERY MORNING
Status: DISCONTINUED | OUTPATIENT
Start: 2022-10-23 | End: 2022-10-24 | Stop reason: HOSPADM

## 2022-10-22 RX ORDER — ASPIRIN 81 MG/1
324 TABLET, CHEWABLE ORAL ONCE
Status: COMPLETED | OUTPATIENT
Start: 2022-10-22 | End: 2022-10-22

## 2022-10-22 RX ORDER — CEFTRIAXONE 1 G/50ML
1 INJECTION, SOLUTION INTRAVENOUS ONCE
Status: COMPLETED | OUTPATIENT
Start: 2022-10-22 | End: 2022-10-22

## 2022-10-22 RX ORDER — LOSARTAN POTASSIUM 50 MG/1
50 TABLET ORAL
Status: DISCONTINUED | OUTPATIENT
Start: 2022-10-23 | End: 2022-10-22

## 2022-10-22 RX ORDER — SODIUM CHLORIDE 0.9 % (FLUSH) 0.9 %
10 SYRINGE (ML) INJECTION EVERY 12 HOURS SCHEDULED
Status: DISCONTINUED | OUTPATIENT
Start: 2022-10-22 | End: 2022-10-24 | Stop reason: HOSPADM

## 2022-10-22 RX ORDER — AMLODIPINE BESYLATE 5 MG/1
5 TABLET ORAL DAILY
Status: DISCONTINUED | OUTPATIENT
Start: 2022-10-23 | End: 2022-10-22

## 2022-10-22 RX ORDER — CEFDINIR 300 MG/1
300 CAPSULE ORAL EVERY 12 HOURS SCHEDULED
Status: DISCONTINUED | OUTPATIENT
Start: 2022-10-23 | End: 2022-10-24 | Stop reason: HOSPADM

## 2022-10-22 RX ORDER — DEXTROSE MONOHYDRATE 25 G/50ML
25 INJECTION, SOLUTION INTRAVENOUS
Status: DISCONTINUED | OUTPATIENT
Start: 2022-10-22 | End: 2022-10-24 | Stop reason: HOSPADM

## 2022-10-22 RX ORDER — LOSARTAN POTASSIUM 50 MG/1
50 TABLET ORAL
Status: DISCONTINUED | OUTPATIENT
Start: 2022-10-22 | End: 2022-10-24

## 2022-10-22 RX ADMIN — INSULIN DETEMIR 30 UNITS: 100 INJECTION, SOLUTION SUBCUTANEOUS at 20:54

## 2022-10-22 RX ADMIN — LOSARTAN POTASSIUM 50 MG: 50 TABLET, FILM COATED ORAL at 22:00

## 2022-10-22 RX ADMIN — POTASSIUM CHLORIDE 40 MEQ: 10 CAPSULE, COATED, EXTENDED RELEASE ORAL at 20:54

## 2022-10-22 RX ADMIN — ASPIRIN 81 MG CHEWABLE TABLET 324 MG: 81 TABLET CHEWABLE at 16:18

## 2022-10-22 RX ADMIN — IOPAMIDOL 50 ML: 612 INJECTION, SOLUTION INTRAVENOUS at 15:31

## 2022-10-22 RX ADMIN — NICOTINE 1 PATCH: 21 PATCH TRANSDERMAL at 22:02

## 2022-10-22 RX ADMIN — AMLODIPINE BESYLATE 5 MG: 5 TABLET ORAL at 22:01

## 2022-10-22 RX ADMIN — Medication 10 ML: at 22:11

## 2022-10-22 RX ADMIN — CEFTRIAXONE 1 G: 1 INJECTION, SOLUTION INTRAVENOUS at 16:49

## 2022-10-22 RX ADMIN — IOPAMIDOL 100 ML: 612 INJECTION, SOLUTION INTRAVENOUS at 15:32

## 2022-10-23 ENCOUNTER — APPOINTMENT (OUTPATIENT)
Dept: MRI IMAGING | Facility: HOSPITAL | Age: 47
End: 2022-10-23

## 2022-10-23 LAB
ANION GAP SERPL CALCULATED.3IONS-SCNC: 8.7 MMOL/L (ref 5–15)
BILIRUB UR QL STRIP: NEGATIVE
BUN SERPL-MCNC: 8 MG/DL (ref 6–20)
BUN/CREAT SERPL: 12.1 (ref 7–25)
CALCIUM SPEC-SCNC: 9 MG/DL (ref 8.6–10.5)
CHLORIDE SERPL-SCNC: 104 MMOL/L (ref 98–107)
CHOLEST SERPL-MCNC: 171 MG/DL (ref 0–200)
CLARITY UR: CLEAR
CO2 SERPL-SCNC: 25.3 MMOL/L (ref 22–29)
COLOR UR: YELLOW
CREAT SERPL-MCNC: 0.66 MG/DL (ref 0.57–1)
DEPRECATED RDW RBC AUTO: 40.9 FL (ref 37–54)
EGFRCR SERPLBLD CKD-EPI 2021: 107.7 ML/MIN/1.73
ERYTHROCYTE [DISTWIDTH] IN BLOOD BY AUTOMATED COUNT: 12.9 % (ref 12.3–15.4)
GLUCOSE BLDC GLUCOMTR-MCNC: 171 MG/DL (ref 70–130)
GLUCOSE BLDC GLUCOMTR-MCNC: 228 MG/DL (ref 70–130)
GLUCOSE BLDC GLUCOMTR-MCNC: 233 MG/DL (ref 70–130)
GLUCOSE BLDC GLUCOMTR-MCNC: 245 MG/DL (ref 70–130)
GLUCOSE SERPL-MCNC: 173 MG/DL (ref 65–99)
GLUCOSE UR STRIP-MCNC: ABNORMAL MG/DL
HCT VFR BLD AUTO: 42.3 % (ref 34–46.6)
HDLC SERPL-MCNC: 25 MG/DL (ref 40–60)
HGB BLD-MCNC: 14.6 G/DL (ref 12–15.9)
HGB UR QL STRIP.AUTO: NEGATIVE
KETONES UR QL STRIP: NEGATIVE
LDLC SERPL CALC-MCNC: 58 MG/DL (ref 0–100)
LDLC/HDLC SERPL: 1.11 {RATIO}
LEUKOCYTE ESTERASE UR QL STRIP.AUTO: NEGATIVE
MAGNESIUM SERPL-MCNC: 2.1 MG/DL (ref 1.6–2.6)
MCH RBC QN AUTO: 30.1 PG (ref 26.6–33)
MCHC RBC AUTO-ENTMCNC: 34.5 G/DL (ref 31.5–35.7)
MCV RBC AUTO: 87.2 FL (ref 79–97)
NITRITE UR QL STRIP: NEGATIVE
PH UR STRIP.AUTO: 5.5 [PH] (ref 5–8)
PLATELET # BLD AUTO: 188 10*3/MM3 (ref 140–450)
PMV BLD AUTO: 11.1 FL (ref 6–12)
POTASSIUM SERPL-SCNC: 4.1 MMOL/L (ref 3.5–5.2)
PROT UR QL STRIP: NEGATIVE
RBC # BLD AUTO: 4.85 10*6/MM3 (ref 3.77–5.28)
SODIUM SERPL-SCNC: 138 MMOL/L (ref 136–145)
SP GR UR STRIP: >=1.03 (ref 1–1.03)
TRIGL SERPL-MCNC: 591 MG/DL (ref 0–150)
UROBILINOGEN UR QL STRIP: ABNORMAL
VLDLC SERPL-MCNC: 88 MG/DL (ref 5–40)
WBC NRBC COR # BLD: 9.05 10*3/MM3 (ref 3.4–10.8)

## 2022-10-23 PROCEDURE — 80048 BASIC METABOLIC PNL TOTAL CA: CPT | Performed by: NURSE PRACTITIONER

## 2022-10-23 PROCEDURE — 63710000001 INSULIN ASPART PER 5 UNITS: Performed by: NURSE PRACTITIONER

## 2022-10-23 PROCEDURE — 82962 GLUCOSE BLOOD TEST: CPT

## 2022-10-23 PROCEDURE — 81003 URINALYSIS AUTO W/O SCOPE: CPT | Performed by: NURSE PRACTITIONER

## 2022-10-23 PROCEDURE — 63710000001 INSULIN DETEMIR PER 5 UNITS: Performed by: NURSE PRACTITIONER

## 2022-10-23 PROCEDURE — 85027 COMPLETE CBC AUTOMATED: CPT | Performed by: NURSE PRACTITIONER

## 2022-10-23 PROCEDURE — 80061 LIPID PANEL: CPT | Performed by: NURSE PRACTITIONER

## 2022-10-23 PROCEDURE — 83735 ASSAY OF MAGNESIUM: CPT | Performed by: NURSE PRACTITIONER

## 2022-10-23 PROCEDURE — 99232 SBSQ HOSP IP/OBS MODERATE 35: CPT | Performed by: NURSE PRACTITIONER

## 2022-10-23 PROCEDURE — 99223 1ST HOSP IP/OBS HIGH 75: CPT | Performed by: PSYCHIATRY & NEUROLOGY

## 2022-10-23 RX ORDER — BENZONATATE 100 MG/1
200 CAPSULE ORAL 3 TIMES DAILY PRN
Status: DISCONTINUED | OUTPATIENT
Start: 2022-10-23 | End: 2022-10-24 | Stop reason: HOSPADM

## 2022-10-23 RX ORDER — ASPIRIN 81 MG/1
81 TABLET, CHEWABLE ORAL DAILY
Status: DISCONTINUED | OUTPATIENT
Start: 2022-10-23 | End: 2022-10-24 | Stop reason: HOSPADM

## 2022-10-23 RX ORDER — CLOPIDOGREL BISULFATE 75 MG/1
300 TABLET ORAL ONCE
Status: COMPLETED | OUTPATIENT
Start: 2022-10-23 | End: 2022-10-23

## 2022-10-23 RX ORDER — ATORVASTATIN CALCIUM 80 MG/1
80 TABLET, FILM COATED ORAL NIGHTLY
Status: DISCONTINUED | OUTPATIENT
Start: 2022-10-23 | End: 2022-10-24 | Stop reason: HOSPADM

## 2022-10-23 RX ADMIN — ASPIRIN 81 MG CHEWABLE TABLET 81 MG: 81 TABLET CHEWABLE at 12:23

## 2022-10-23 RX ADMIN — INSULIN ASPART 4 UNITS: 100 INJECTION, SOLUTION INTRAVENOUS; SUBCUTANEOUS at 18:30

## 2022-10-23 RX ADMIN — POTASSIUM CHLORIDE 40 MEQ: 10 CAPSULE, COATED, EXTENDED RELEASE ORAL at 06:31

## 2022-10-23 RX ADMIN — POTASSIUM CHLORIDE 40 MEQ: 10 CAPSULE, COATED, EXTENDED RELEASE ORAL at 00:04

## 2022-10-23 RX ADMIN — LOSARTAN POTASSIUM 50 MG: 50 TABLET, FILM COATED ORAL at 09:33

## 2022-10-23 RX ADMIN — PREGABALIN 200 MG: 75 CAPSULE ORAL at 00:19

## 2022-10-23 RX ADMIN — CEFDINIR 300 MG: 300 CAPSULE ORAL at 20:45

## 2022-10-23 RX ADMIN — PREGABALIN 200 MG: 75 CAPSULE ORAL at 09:33

## 2022-10-23 RX ADMIN — Medication 10 ML: at 20:46

## 2022-10-23 RX ADMIN — ATORVASTATIN CALCIUM 80 MG: 80 TABLET, FILM COATED ORAL at 20:46

## 2022-10-23 RX ADMIN — INSULIN DETEMIR 30 UNITS: 100 INJECTION, SOLUTION SUBCUTANEOUS at 20:45

## 2022-10-23 RX ADMIN — INSULIN ASPART 4 UNITS: 100 INJECTION, SOLUTION INTRAVENOUS; SUBCUTANEOUS at 12:23

## 2022-10-23 RX ADMIN — PREGABALIN 200 MG: 75 CAPSULE ORAL at 20:45

## 2022-10-23 RX ADMIN — Medication 10 ML: at 09:33

## 2022-10-23 RX ADMIN — CLOPIDOGREL BISULFATE 300 MG: 75 TABLET ORAL at 12:23

## 2022-10-23 RX ADMIN — CEFDINIR 300 MG: 300 CAPSULE ORAL at 09:33

## 2022-10-23 RX ADMIN — BENZONATATE 200 MG: 100 CAPSULE ORAL at 21:55

## 2022-10-23 RX ADMIN — INSULIN ASPART 2 UNITS: 100 INJECTION, SOLUTION INTRAVENOUS; SUBCUTANEOUS at 06:31

## 2022-10-23 RX ADMIN — AMLODIPINE BESYLATE 5 MG: 5 TABLET ORAL at 09:33

## 2022-10-24 ENCOUNTER — READMISSION MANAGEMENT (OUTPATIENT)
Dept: CALL CENTER | Facility: HOSPITAL | Age: 47
End: 2022-10-24

## 2022-10-24 ENCOUNTER — APPOINTMENT (OUTPATIENT)
Dept: MRI IMAGING | Facility: HOSPITAL | Age: 47
End: 2022-10-24

## 2022-10-24 VITALS
DIASTOLIC BLOOD PRESSURE: 63 MMHG | HEART RATE: 77 BPM | SYSTOLIC BLOOD PRESSURE: 104 MMHG | HEIGHT: 60 IN | RESPIRATION RATE: 20 BRPM | BODY MASS INDEX: 37.61 KG/M2 | WEIGHT: 191.58 LBS | TEMPERATURE: 98.3 F | OXYGEN SATURATION: 99 %

## 2022-10-24 PROBLEM — I63.81 THALAMIC INFARCT, ACUTE: Status: ACTIVE | Noted: 2022-10-24

## 2022-10-24 LAB
GLUCOSE BLDC GLUCOMTR-MCNC: 232 MG/DL (ref 70–130)
GLUCOSE BLDC GLUCOMTR-MCNC: 285 MG/DL (ref 70–130)

## 2022-10-24 PROCEDURE — 70551 MRI BRAIN STEM W/O DYE: CPT

## 2022-10-24 PROCEDURE — 63710000001 INSULIN ASPART PER 5 UNITS: Performed by: NURSE PRACTITIONER

## 2022-10-24 PROCEDURE — 82962 GLUCOSE BLOOD TEST: CPT

## 2022-10-24 PROCEDURE — 99239 HOSP IP/OBS DSCHRG MGMT >30: CPT | Performed by: INTERNAL MEDICINE

## 2022-10-24 RX ORDER — LOSARTAN POTASSIUM 50 MG/1
50 TABLET ORAL DAILY
Start: 2022-10-24 | End: 2023-02-13 | Stop reason: SDUPTHER

## 2022-10-24 RX ORDER — AMLODIPINE BESYLATE 5 MG/1
5 TABLET ORAL DAILY
Start: 2022-10-24 | End: 2022-11-03 | Stop reason: HOSPADM

## 2022-10-24 RX ORDER — OMEPRAZOLE 20 MG/1
CAPSULE, DELAYED RELEASE ORAL
Refills: 0
Start: 2022-10-24 | End: 2022-11-08

## 2022-10-24 RX ORDER — ASPIRIN 81 MG/1
81 TABLET, CHEWABLE ORAL DAILY
Qty: 30 TABLET | Refills: 0 | Status: SHIPPED | OUTPATIENT
Start: 2022-10-25 | End: 2022-12-05 | Stop reason: SDUPTHER

## 2022-10-24 RX ORDER — ATORVASTATIN CALCIUM 80 MG/1
80 TABLET, FILM COATED ORAL NIGHTLY
Qty: 30 TABLET | Refills: 0 | Status: SHIPPED | OUTPATIENT
Start: 2022-10-24 | End: 2022-12-05 | Stop reason: SDUPTHER

## 2022-10-24 RX ORDER — CLOPIDOGREL BISULFATE 75 MG/1
75 TABLET ORAL DAILY
Qty: 30 TABLET | Refills: 0 | Status: SHIPPED | OUTPATIENT
Start: 2022-10-24 | End: 2022-11-28 | Stop reason: SDUPTHER

## 2022-10-24 RX ADMIN — Medication 10 ML: at 09:04

## 2022-10-24 RX ADMIN — ASPIRIN 81 MG CHEWABLE TABLET 81 MG: 81 TABLET CHEWABLE at 09:04

## 2022-10-24 RX ADMIN — PREGABALIN 200 MG: 75 CAPSULE ORAL at 09:04

## 2022-10-24 RX ADMIN — INSULIN ASPART 6 UNITS: 100 INJECTION, SOLUTION INTRAVENOUS; SUBCUTANEOUS at 12:44

## 2022-10-24 RX ADMIN — CEFDINIR 300 MG: 300 CAPSULE ORAL at 09:04

## 2022-10-24 RX ADMIN — ACETAMINOPHEN 650 MG: 325 TABLET, FILM COATED ORAL at 06:36

## 2022-10-24 RX ADMIN — INSULIN ASPART 4 UNITS: 100 INJECTION, SOLUTION INTRAVENOUS; SUBCUTANEOUS at 06:31

## 2022-10-25 ENCOUNTER — NURSE TRIAGE (OUTPATIENT)
Dept: CALL CENTER | Facility: HOSPITAL | Age: 47
End: 2022-10-25

## 2022-10-25 NOTE — TELEPHONE ENCOUNTER
Advised caller [employer] [place] states Nyquil with Flexeril or Zyrtec may cause excessive drowsiness. Advised he check with pharmacists about what cough medication is best to take with her other medications. Also gave Doctor finder number to find new PCP if Dr. Anderson will not see her in FU.     Reason for Disposition  • [1] Caller has medicine question about med NOT prescribed by PCP AND [2] triager unable to answer question (e.g., compatibility with other med, storage)    Additional Information  • Negative: [1] Intentional drug overdose AND [2] suicidal thoughts or ideas  • Negative: Drug overdose and triager unable to answer question  • Negative: Caller requesting information unrelated to medicine  • Negative: Caller requesting information about COVID-19 Vaccine  • Negative: Caller requesting information about Emergency Contraception  • Negative: Caller requesting information about Combined Birth Control Pills  • Negative: Caller requesting information about Progestin Birth Control Pills  • Negative: Caller requesting information about Post-Op pain or medicines  • Negative: Caller requesting a prescription antibiotic (such as Penicillin) for Strep throat and has a positive culture result  • Negative: Caller requesting a prescription anti-viral med (such as Tamiflu) and has influenza (flu)  symptoms  • Negative: Immunization reaction suspected  • Negative: Rash while taking a medicine or within 3 days of stopping it  • Negative: [1] Asthma and [2] having symptoms of asthma (cough, wheezing, etc.)  • Negative: [1] Symptom of illness (e.g., headache, abdominal pain, earache, vomiting) AND [2] more than mild  • Negative: Breastfeeding questions about mother's medicines and diet  • Negative: MORE THAN A DOUBLE DOSE of a prescription or over-the-counter (OTC) drug  • Negative: [1] DOUBLE DOSE (an extra dose or lesser amount) of prescription drug AND [2] any symptoms (e.g., dizziness, nausea, pain, sleepiness)  • Negative:  "[1] DOUBLE DOSE (an extra dose or lesser amount) of over-the-counter (OTC) drug AND [2] any symptoms (e.g., dizziness, nausea, pain, sleepiness)  • Negative: Took another person's prescription drug  • Negative: [1] DOUBLE DOSE (an extra dose or lesser amount) of prescription drug AND [2] NO symptoms (Exception: a double dose of antibiotics)  • Negative: Diabetes drug error or overdose (e.g., took wrong type of insulin or took extra dose)  • Negative: [1] Prescription refill request for ESSENTIAL medicine (i.e., likelihood of harm to patient if not taken) AND [2] triager unable to refill per department policy  • Negative: [1] Prescription not at pharmacy AND [2] was prescribed by PCP recently (Exception: triager has access to EMR and prescription is recorded there. Go to Home Care and confirm for pharmacy.)  • Negative: [1] Pharmacy calling with prescription question AND [2] triager unable to answer question  • Negative: [1] Caller has URGENT medicine question about med that PCP or specialist prescribed AND [2] triager unable to answer question  • Negative: Medicine patch causing local rash or itching    Answer Assessment - Initial Assessment Questions  1. NAME of MEDICATION: \"What medicine are you calling about?\"      Asking if she can take Nyquil with the other medications she is on  2. QUESTION: \"What is your question?\" (e.g., medication refill, side effect)      Can she take Nyquil with her other medications  3. PRESCRIBING HCP: \"Who prescribed it?\" Reason: if prescribed by specialist, call should be referred to that group.      *No Answer*  4. SYMPTOMS: \"Do you have any symptoms?\"      Cough   5. SEVERITY: If symptoms are present, ask \"Are they mild, moderate or severe?\"      *No Answer*  6. PREGNANCY:  \"Is there any chance that you are pregnant?\" \"When was your last menstrual period?\"      *No Answer*    Protocols used: MEDICATION QUESTION CALL-ADULT-AH      "

## 2022-10-25 NOTE — OUTREACH NOTE
Prep Survey    Flowsheet Row Responses   [facility] facility patient discharged from? [facility]   Is LACE score < 7 ? No   Emergency Room discharge w/ pulse ox? No   Eligibility Readm Mgmt   Discharge diagnosis Acute left thalamic infarct, acute UTI, hypoglycemia, Hypokalemia   Does the patient have one of the following disease processes/diagnoses(primary or secondary)? Stroke   Does the patient have Home health ordered? No   Is there a DME ordered? No   Prep survey completed? Yes          ISIDRO LÓPEZ - Registered Nurse

## 2022-10-26 ENCOUNTER — READMISSION MANAGEMENT (OUTPATIENT)
Dept: CALL CENTER | Facility: HOSPITAL | Age: 47
End: 2022-10-26

## 2022-10-26 NOTE — OUTREACH NOTE
Stroke Week 1 Survey    Flowsheet Row Responses   [facility], [place], operated by [facility] patient discharged from? [facility]   Does the patient have one of the following disease processes/diagnoses(primary or secondary)? Stroke   Week 1 attempt successful? Yes   Call start time 1008   Call end time 1018   Discharge diagnosis Acute left thalamic infarct, acute UTI, hypoglycemia, Hypokalemia   Meds reviewed with patient/caregiver? Yes   Is the patient having any side effects they believe may be caused by any medication additions or changes? No   Does the patient have all medications ordered at discharge? Yes   Is the patient taking all medications as directed (includes completed medication regime)? Yes   Does the patient have a primary care provider?  Yes   Does the patient have an appointment with their PCP within 7 days of discharge? No   What is preventing the patient from scheduling follow up appointments within 7 days of discharge? Unsure of when or with whom   Nursing Interventions Advised patient to make appointment   Has the patient kept scheduled appointments due by today? N/A   Has home health visited the patient within 72 hours of discharge? N/A   Psychosocial issues? No   Does the patient require any assistance with activities of daily living such as eating, bathing, dressing, walking, etc.? No   Does the patient have any residual symptoms from stroke/TIA? Yes  [Visual changes]   Did the patient receive a copy of their discharge instructions? Yes   Nursing interventions Reviewed instructions with patient   What is the patient's perception of their health status since discharge? Improving  [Tired]   Nursing interventions Nurse provided patient education   Is the patient/caregiver able to teach back the risk factors for a stroke? History of TIAs, High blood pressure-goal below 120/80, Smoking, Diabetes, High Cholesterol, Physical inactivity and obesity, Sleep apnea, Excessive alcohol intake   Is the patient/caregiver able to teach  back the hierarchy of who to call/visit for symptoms/problems? PCP, Specialist, Home health nurse, Urgent Care, ED, 911 Yes   Is the patient able to teach back FAST for Stroke? B alance: Watch for sudden loss of balance, E yes: Check for vision loss, F ace: Look for an uneven smile, A rm: Check if one arm is weak, S peech: Listen for slurred speech, T kirit: Call 9-1-1 right away   Week 1 call completed? Yes          SHERLY NAVARRO - Registered Nurse

## 2022-10-31 ENCOUNTER — APPOINTMENT (OUTPATIENT)
Dept: CT IMAGING | Facility: HOSPITAL | Age: 47
End: 2022-10-31

## 2022-10-31 ENCOUNTER — APPOINTMENT (OUTPATIENT)
Dept: CARDIOLOGY | Facility: HOSPITAL | Age: 47
End: 2022-10-31

## 2022-10-31 ENCOUNTER — HOSPITAL ENCOUNTER (OUTPATIENT)
Facility: HOSPITAL | Age: 47
Setting detail: OBSERVATION
LOS: 1 days | Discharge: HOME | End: 2022-11-03
Attending: EMERGENCY MEDICINE | Admitting: INTERNAL MEDICINE

## 2022-10-31 DIAGNOSIS — R51.9 OCCIPITAL HEADACHE: ICD-10-CM

## 2022-10-31 DIAGNOSIS — R41.840 COGNITIVE ATTENTION DEFICIT: ICD-10-CM

## 2022-10-31 DIAGNOSIS — R41.82 ALTERED MENTAL STATUS, UNSPECIFIED ALTERED MENTAL STATUS TYPE: Primary | ICD-10-CM

## 2022-10-31 DIAGNOSIS — N39.0 URINARY TRACT INFECTION WITHOUT HEMATURIA, SITE UNSPECIFIED: ICD-10-CM

## 2022-10-31 DIAGNOSIS — E87.6 HYPOKALEMIA: ICD-10-CM

## 2022-10-31 DIAGNOSIS — I63.81 LEFT THALAMIC INFARCTION: ICD-10-CM

## 2022-10-31 PROBLEM — Z86.73 RECENT CEREBROVASCULAR ACCIDENT (CVA): Status: ACTIVE | Noted: 2022-10-31

## 2022-10-31 PROBLEM — H53.8 BLURRY VISION, RIGHT EYE: Status: ACTIVE | Noted: 2022-10-31

## 2022-10-31 LAB
ALBUMIN SERPL-MCNC: 3.5 G/DL (ref 3.5–5.2)
ALBUMIN/GLOB SERPL: 1.1 G/DL
ALP SERPL-CCNC: 92 U/L (ref 39–117)
ALT SERPL W P-5'-P-CCNC: 29 U/L (ref 1–33)
AMPHET+METHAMPHET UR QL: NEGATIVE
AMPHETAMINES UR QL: NEGATIVE
ANION GAP SERPL CALCULATED.3IONS-SCNC: 9 MMOL/L (ref 5–15)
APTT PPP: 32.7 SECONDS (ref 60–90)
AST SERPL-CCNC: 33 U/L (ref 1–32)
B-HCG UR QL: NEGATIVE
BACTERIA UR QL AUTO: ABNORMAL /HPF
BARBITURATES UR QL SCN: NEGATIVE
BASOPHILS # BLD AUTO: 0.05 10*3/MM3 (ref 0–0.2)
BASOPHILS NFR BLD AUTO: 0.3 % (ref 0–1.5)
BENZODIAZ UR QL SCN: NEGATIVE
BILIRUB SERPL-MCNC: 0.3 MG/DL (ref 0–1.2)
BILIRUB UR QL STRIP: NEGATIVE
BUN SERPL-MCNC: 9 MG/DL (ref 6–20)
BUN/CREAT SERPL: 14.8 (ref 7–25)
BUPRENORPHINE SERPL-MCNC: NEGATIVE NG/ML
CALCIUM SPEC-SCNC: 8.7 MG/DL (ref 8.6–10.5)
CANNABINOIDS SERPL QL: POSITIVE
CHLORIDE SERPL-SCNC: 97 MMOL/L (ref 98–107)
CLARITY UR: ABNORMAL
CO2 SERPL-SCNC: 21 MMOL/L (ref 22–29)
COCAINE UR QL: NEGATIVE
COLOR UR: YELLOW
CREAT BLDA-MCNC: 0.6 MG/DL
CREAT BLDA-MCNC: 0.6 MG/DL (ref 0.6–1.3)
CREAT SERPL-MCNC: 0.61 MG/DL (ref 0.57–1)
CRP SERPL-MCNC: 12.85 MG/DL (ref 0–0.5)
D-LACTATE SERPL-SCNC: 1 MMOL/L (ref 0.5–2)
DEPRECATED RDW RBC AUTO: 39.8 FL (ref 37–54)
EGFRCR SERPLBLD CKD-EPI 2021: 109.8 ML/MIN/1.73
EOSINOPHIL # BLD AUTO: 0.05 10*3/MM3 (ref 0–0.4)
EOSINOPHIL NFR BLD AUTO: 0.3 % (ref 0.3–6.2)
ERYTHROCYTE [DISTWIDTH] IN BLOOD BY AUTOMATED COUNT: 12.5 % (ref 12.3–15.4)
ERYTHROCYTE [SEDIMENTATION RATE] IN BLOOD: 40 MM/HR (ref 0–20)
ETHANOL BLD-MCNC: 10 MG/DL (ref 0–10)
FLUAV RNA RESP QL NAA+PROBE: NOT DETECTED
FLUBV RNA RESP QL NAA+PROBE: NOT DETECTED
GLOBULIN UR ELPH-MCNC: 3.2 GM/DL
GLUCOSE BLDC GLUCOMTR-MCNC: 270 MG/DL (ref 70–130)
GLUCOSE BLDC GLUCOMTR-MCNC: 320 MG/DL (ref 70–130)
GLUCOSE SERPL-MCNC: 272 MG/DL (ref 65–99)
GLUCOSE UR STRIP-MCNC: ABNORMAL MG/DL
HCT VFR BLD AUTO: 45.5 % (ref 34–46.6)
HGB BLD-MCNC: 15.6 G/DL (ref 12–15.9)
HGB UR QL STRIP.AUTO: NEGATIVE
HYALINE CASTS UR QL AUTO: ABNORMAL /LPF
IMM GRANULOCYTES # BLD AUTO: 0.09 10*3/MM3 (ref 0–0.05)
IMM GRANULOCYTES NFR BLD AUTO: 0.5 % (ref 0–0.5)
INR PPP: 1.16 (ref 0.84–1.13)
KETONES UR QL STRIP: ABNORMAL
LEUKOCYTE ESTERASE UR QL STRIP.AUTO: ABNORMAL
LYMPHOCYTES # BLD AUTO: 1.35 10*3/MM3 (ref 0.7–3.1)
LYMPHOCYTES NFR BLD AUTO: 7.4 % (ref 19.6–45.3)
MCH RBC QN AUTO: 30 PG (ref 26.6–33)
MCHC RBC AUTO-ENTMCNC: 34.3 G/DL (ref 31.5–35.7)
MCV RBC AUTO: 87.5 FL (ref 79–97)
METHADONE UR QL SCN: NEGATIVE
MONOCYTES # BLD AUTO: 0.88 10*3/MM3 (ref 0.1–0.9)
MONOCYTES NFR BLD AUTO: 4.9 % (ref 5–12)
NEUTROPHILS NFR BLD AUTO: 15.72 10*3/MM3 (ref 1.7–7)
NEUTROPHILS NFR BLD AUTO: 86.6 % (ref 42.7–76)
NITRITE UR QL STRIP: POSITIVE
NRBC BLD AUTO-RTO: 0 /100 WBC (ref 0–0.2)
OPIATES UR QL: NEGATIVE
OXYCODONE UR QL SCN: NEGATIVE
PCP UR QL SCN: NEGATIVE
PH UR STRIP.AUTO: 5.5 [PH] (ref 5–8)
PLATELET # BLD AUTO: 208 10*3/MM3 (ref 140–450)
PMV BLD AUTO: 11.2 FL (ref 6–12)
POTASSIUM SERPL-SCNC: 3.1 MMOL/L (ref 3.5–5.2)
PROPOXYPH UR QL: NEGATIVE
PROT SERPL-MCNC: 6.7 G/DL (ref 6–8.5)
PROT UR QL STRIP: ABNORMAL
PROTHROMBIN TIME: 14.7 SECONDS (ref 11.4–14.4)
RBC # BLD AUTO: 5.2 10*6/MM3 (ref 3.77–5.28)
RBC # UR STRIP: ABNORMAL /HPF
REF LAB TEST METHOD: ABNORMAL
SARS-COV-2 RNA RESP QL NAA+PROBE: NOT DETECTED
SODIUM SERPL-SCNC: 127 MMOL/L (ref 136–145)
SP GR UR STRIP: 1.04 (ref 1–1.03)
SQUAMOUS #/AREA URNS HPF: ABNORMAL /HPF
TRICYCLICS UR QL SCN: NEGATIVE
UROBILINOGEN UR QL STRIP: ABNORMAL
WBC # UR STRIP: ABNORMAL /HPF
WBC NRBC COR # BLD: 18.14 10*3/MM3 (ref 3.4–10.8)

## 2022-10-31 PROCEDURE — 70496 CT ANGIOGRAPHY HEAD: CPT

## 2022-10-31 PROCEDURE — 99214 OFFICE O/P EST MOD 30 MIN: CPT | Performed by: STUDENT IN AN ORGANIZED HEALTH CARE EDUCATION/TRAINING PROGRAM

## 2022-10-31 PROCEDURE — G0378 HOSPITAL OBSERVATION PER HR: HCPCS

## 2022-10-31 PROCEDURE — C9803 HOPD COVID-19 SPEC COLLECT: HCPCS

## 2022-10-31 PROCEDURE — 86140 C-REACTIVE PROTEIN: CPT | Performed by: PHYSICIAN ASSISTANT

## 2022-10-31 PROCEDURE — 92523 SPEECH SOUND LANG COMPREHEN: CPT

## 2022-10-31 PROCEDURE — 85652 RBC SED RATE AUTOMATED: CPT | Performed by: PHYSICIAN ASSISTANT

## 2022-10-31 PROCEDURE — 93306 TTE W/DOPPLER COMPLETE: CPT | Performed by: INTERNAL MEDICINE

## 2022-10-31 PROCEDURE — 25010000002 HYDROMORPHONE PER 4 MG: Performed by: EMERGENCY MEDICINE

## 2022-10-31 PROCEDURE — 99285 EMERGENCY DEPT VISIT HI MDM: CPT

## 2022-10-31 PROCEDURE — 96367 TX/PROPH/DG ADDL SEQ IV INF: CPT

## 2022-10-31 PROCEDURE — 85025 COMPLETE CBC W/AUTO DIFF WBC: CPT | Performed by: PHYSICIAN ASSISTANT

## 2022-10-31 PROCEDURE — 25010000002 DIPHENHYDRAMINE PER 50 MG: Performed by: NURSE PRACTITIONER

## 2022-10-31 PROCEDURE — 25010000002 HEPARIN (PORCINE) PER 1000 UNITS: Performed by: INTERNAL MEDICINE

## 2022-10-31 PROCEDURE — 96372 THER/PROPH/DIAG INJ SC/IM: CPT

## 2022-10-31 PROCEDURE — 80306 DRUG TEST PRSMV INSTRMNT: CPT | Performed by: PHYSICIAN ASSISTANT

## 2022-10-31 PROCEDURE — 25010000002 KETOROLAC TROMETHAMINE PER 15 MG: Performed by: NURSE PRACTITIONER

## 2022-10-31 PROCEDURE — 87040 BLOOD CULTURE FOR BACTERIA: CPT | Performed by: PHYSICIAN ASSISTANT

## 2022-10-31 PROCEDURE — 93306 TTE W/DOPPLER COMPLETE: CPT

## 2022-10-31 PROCEDURE — 80053 COMPREHEN METABOLIC PANEL: CPT | Performed by: PHYSICIAN ASSISTANT

## 2022-10-31 PROCEDURE — 96365 THER/PROPH/DIAG IV INF INIT: CPT

## 2022-10-31 PROCEDURE — 87186 SC STD MICRODIL/AGAR DIL: CPT | Performed by: INTERNAL MEDICINE

## 2022-10-31 PROCEDURE — 63710000001 INSULIN DETEMIR PER 5 UNITS: Performed by: INTERNAL MEDICINE

## 2022-10-31 PROCEDURE — 82962 GLUCOSE BLOOD TEST: CPT

## 2022-10-31 PROCEDURE — 25010000002 PROCHLORPERAZINE 10 MG/2ML SOLUTION: Performed by: NURSE PRACTITIONER

## 2022-10-31 PROCEDURE — 85730 THROMBOPLASTIN TIME PARTIAL: CPT | Performed by: PHYSICIAN ASSISTANT

## 2022-10-31 PROCEDURE — 25010000002 MAGNESIUM SULFATE IN D5W 1G/100ML (PREMIX) 1-5 GM/100ML-% SOLUTION: Performed by: INTERNAL MEDICINE

## 2022-10-31 PROCEDURE — 82565 ASSAY OF CREATININE: CPT

## 2022-10-31 PROCEDURE — 99220 PR INITIAL OBSERVATION CARE/DAY 70 MINUTES: CPT | Performed by: INTERNAL MEDICINE

## 2022-10-31 PROCEDURE — 81025 URINE PREGNANCY TEST: CPT | Performed by: EMERGENCY MEDICINE

## 2022-10-31 PROCEDURE — 83605 ASSAY OF LACTIC ACID: CPT | Performed by: PHYSICIAN ASSISTANT

## 2022-10-31 PROCEDURE — 25010000002 CEFTRIAXONE PER 250 MG: Performed by: PHYSICIAN ASSISTANT

## 2022-10-31 PROCEDURE — 87086 URINE CULTURE/COLONY COUNT: CPT | Performed by: INTERNAL MEDICINE

## 2022-10-31 PROCEDURE — 96375 TX/PRO/DX INJ NEW DRUG ADDON: CPT

## 2022-10-31 PROCEDURE — 82077 ASSAY SPEC XCP UR&BREATH IA: CPT | Performed by: PHYSICIAN ASSISTANT

## 2022-10-31 PROCEDURE — 93005 ELECTROCARDIOGRAM TRACING: CPT | Performed by: PHYSICIAN ASSISTANT

## 2022-10-31 PROCEDURE — 97161 PT EVAL LOW COMPLEX 20 MIN: CPT

## 2022-10-31 PROCEDURE — 0 IOPAMIDOL PER 1 ML: Performed by: EMERGENCY MEDICINE

## 2022-10-31 PROCEDURE — 85610 PROTHROMBIN TIME: CPT | Performed by: PHYSICIAN ASSISTANT

## 2022-10-31 PROCEDURE — 36415 COLL VENOUS BLD VENIPUNCTURE: CPT

## 2022-10-31 PROCEDURE — 81001 URINALYSIS AUTO W/SCOPE: CPT | Performed by: PHYSICIAN ASSISTANT

## 2022-10-31 PROCEDURE — 87636 SARSCOV2 & INF A&B AMP PRB: CPT | Performed by: PHYSICIAN ASSISTANT

## 2022-10-31 PROCEDURE — 87077 CULTURE AEROBIC IDENTIFY: CPT | Performed by: INTERNAL MEDICINE

## 2022-10-31 PROCEDURE — 70450 CT HEAD/BRAIN W/O DYE: CPT

## 2022-10-31 PROCEDURE — 70498 CT ANGIOGRAPHY NECK: CPT

## 2022-10-31 RX ORDER — SODIUM CHLORIDE 0.9 % (FLUSH) 0.9 %
10 SYRINGE (ML) INJECTION AS NEEDED
Status: DISCONTINUED | OUTPATIENT
Start: 2022-10-31 | End: 2022-11-03 | Stop reason: HOSPADM

## 2022-10-31 RX ORDER — ASPIRIN 300 MG/1
300 SUPPOSITORY RECTAL DAILY
Status: DISCONTINUED | OUTPATIENT
Start: 2022-10-31 | End: 2022-11-03 | Stop reason: HOSPADM

## 2022-10-31 RX ORDER — ASPIRIN 300 MG/1
300 SUPPOSITORY RECTAL DAILY
Status: CANCELLED | OUTPATIENT
Start: 2022-10-31

## 2022-10-31 RX ORDER — ATORVASTATIN CALCIUM 40 MG/1
80 TABLET, FILM COATED ORAL NIGHTLY
Status: DISCONTINUED | OUTPATIENT
Start: 2022-10-31 | End: 2022-10-31 | Stop reason: SDUPTHER

## 2022-10-31 RX ORDER — POTASSIUM CHLORIDE 750 MG/1
40 CAPSULE, EXTENDED RELEASE ORAL ONCE
Status: COMPLETED | OUTPATIENT
Start: 2022-10-31 | End: 2022-10-31

## 2022-10-31 RX ORDER — ATORVASTATIN CALCIUM 40 MG/1
80 TABLET, FILM COATED ORAL NIGHTLY
Status: DISCONTINUED | OUTPATIENT
Start: 2022-10-31 | End: 2022-11-03 | Stop reason: HOSPADM

## 2022-10-31 RX ORDER — ASPIRIN 81 MG/1
81 TABLET, CHEWABLE ORAL DAILY
Status: DISCONTINUED | OUTPATIENT
Start: 2022-10-31 | End: 2022-11-03 | Stop reason: HOSPADM

## 2022-10-31 RX ORDER — KETOROLAC TROMETHAMINE 30 MG/ML
30 INJECTION, SOLUTION INTRAMUSCULAR; INTRAVENOUS EVERY 6 HOURS PRN
Status: DISPENSED | OUTPATIENT
Start: 2022-10-31 | End: 2022-11-02

## 2022-10-31 RX ORDER — POTASSIUM CHLORIDE 7.45 MG/ML
10 INJECTION INTRAVENOUS
Status: DISCONTINUED | OUTPATIENT
Start: 2022-10-31 | End: 2022-11-03 | Stop reason: HOSPADM

## 2022-10-31 RX ORDER — BISACODYL 5 MG/1
5 TABLET, DELAYED RELEASE ORAL DAILY PRN
Status: DISCONTINUED | OUTPATIENT
Start: 2022-10-31 | End: 2022-11-03 | Stop reason: HOSPADM

## 2022-10-31 RX ORDER — POTASSIUM CHLORIDE 1.5 G/1.77G
40 POWDER, FOR SOLUTION ORAL AS NEEDED
Status: DISCONTINUED | OUTPATIENT
Start: 2022-10-31 | End: 2022-11-03 | Stop reason: HOSPADM

## 2022-10-31 RX ORDER — CLOPIDOGREL BISULFATE 75 MG/1
300 TABLET ORAL ONCE
Status: COMPLETED | OUTPATIENT
Start: 2022-10-31 | End: 2022-10-31

## 2022-10-31 RX ORDER — CLOPIDOGREL BISULFATE 75 MG/1
75 TABLET ORAL DAILY
Status: DISCONTINUED | OUTPATIENT
Start: 2022-11-01 | End: 2022-11-03 | Stop reason: HOSPADM

## 2022-10-31 RX ORDER — DEXTROSE MONOHYDRATE 25 G/50ML
25 INJECTION, SOLUTION INTRAVENOUS
Status: DISCONTINUED | OUTPATIENT
Start: 2022-10-31 | End: 2022-11-03 | Stop reason: HOSPADM

## 2022-10-31 RX ORDER — ASPIRIN 81 MG/1
81 TABLET, CHEWABLE ORAL DAILY
Status: DISCONTINUED | OUTPATIENT
Start: 2022-10-31 | End: 2022-10-31

## 2022-10-31 RX ORDER — POLYETHYLENE GLYCOL 3350 17 G/17G
17 POWDER, FOR SOLUTION ORAL DAILY PRN
Status: DISCONTINUED | OUTPATIENT
Start: 2022-10-31 | End: 2022-11-03 | Stop reason: HOSPADM

## 2022-10-31 RX ORDER — PANTOPRAZOLE SODIUM 40 MG/1
40 TABLET, DELAYED RELEASE ORAL EVERY MORNING
Status: DISCONTINUED | OUTPATIENT
Start: 2022-11-01 | End: 2022-11-03 | Stop reason: HOSPADM

## 2022-10-31 RX ORDER — AMOXICILLIN 250 MG
2 CAPSULE ORAL 2 TIMES DAILY
Status: DISCONTINUED | OUTPATIENT
Start: 2022-10-31 | End: 2022-11-03 | Stop reason: HOSPADM

## 2022-10-31 RX ORDER — SODIUM CHLORIDE 0.9 % (FLUSH) 0.9 %
10 SYRINGE (ML) INJECTION EVERY 12 HOURS SCHEDULED
Status: DISCONTINUED | OUTPATIENT
Start: 2022-10-31 | End: 2022-11-03 | Stop reason: HOSPADM

## 2022-10-31 RX ORDER — SODIUM CHLORIDE 0.9 % (FLUSH) 0.9 %
10 SYRINGE (ML) INJECTION AS NEEDED
Status: CANCELLED | OUTPATIENT
Start: 2022-10-31

## 2022-10-31 RX ORDER — ALBUTEROL SULFATE 90 UG/1
2 AEROSOL, METERED RESPIRATORY (INHALATION) EVERY 4 HOURS PRN
Status: DISCONTINUED | OUTPATIENT
Start: 2022-10-31 | End: 2022-10-31

## 2022-10-31 RX ORDER — ACETAMINOPHEN 500 MG
1000 TABLET ORAL 3 TIMES DAILY
Status: DISCONTINUED | OUTPATIENT
Start: 2022-10-31 | End: 2022-11-03 | Stop reason: HOSPADM

## 2022-10-31 RX ORDER — KETOROLAC TROMETHAMINE 30 MG/ML
30 INJECTION, SOLUTION INTRAMUSCULAR; INTRAVENOUS ONCE
Status: COMPLETED | OUTPATIENT
Start: 2022-10-31 | End: 2022-10-31

## 2022-10-31 RX ORDER — INSULIN LISPRO 100 [IU]/ML
0-7 INJECTION, SOLUTION INTRAVENOUS; SUBCUTANEOUS
Status: DISCONTINUED | OUTPATIENT
Start: 2022-10-31 | End: 2022-11-03 | Stop reason: HOSPADM

## 2022-10-31 RX ORDER — AMLODIPINE BESYLATE 5 MG/1
5 TABLET ORAL DAILY
Status: DISCONTINUED | OUTPATIENT
Start: 2022-10-31 | End: 2022-11-03 | Stop reason: HOSPADM

## 2022-10-31 RX ORDER — CLOPIDOGREL BISULFATE 75 MG/1
75 TABLET ORAL DAILY
Status: CANCELLED | OUTPATIENT
Start: 2022-10-31

## 2022-10-31 RX ORDER — ASPIRIN 325 MG
325 TABLET ORAL DAILY
Status: CANCELLED | OUTPATIENT
Start: 2022-10-31

## 2022-10-31 RX ORDER — MAGNESIUM SULFATE 1 G/100ML
1 INJECTION INTRAVENOUS ONCE
Status: COMPLETED | OUTPATIENT
Start: 2022-10-31 | End: 2022-10-31

## 2022-10-31 RX ORDER — LOSARTAN POTASSIUM 50 MG/1
50 TABLET ORAL DAILY
Status: DISCONTINUED | OUTPATIENT
Start: 2022-10-31 | End: 2022-11-03 | Stop reason: HOSPADM

## 2022-10-31 RX ORDER — METOCLOPRAMIDE HYDROCHLORIDE 5 MG/ML
10 INJECTION INTRAMUSCULAR; INTRAVENOUS EVERY 6 HOURS PRN
Status: DISCONTINUED | OUTPATIENT
Start: 2022-10-31 | End: 2022-11-03 | Stop reason: HOSPADM

## 2022-10-31 RX ORDER — ALBUTEROL SULFATE 2.5 MG/3ML
2.5 SOLUTION RESPIRATORY (INHALATION) EVERY 4 HOURS PRN
Status: DISCONTINUED | OUTPATIENT
Start: 2022-10-31 | End: 2022-11-03 | Stop reason: HOSPADM

## 2022-10-31 RX ORDER — PROCHLORPERAZINE EDISYLATE 5 MG/ML
10 INJECTION INTRAMUSCULAR; INTRAVENOUS ONCE
Status: COMPLETED | OUTPATIENT
Start: 2022-10-31 | End: 2022-10-31

## 2022-10-31 RX ORDER — HYDROMORPHONE HYDROCHLORIDE 1 MG/ML
0.25 INJECTION, SOLUTION INTRAMUSCULAR; INTRAVENOUS; SUBCUTANEOUS ONCE
Status: COMPLETED | OUTPATIENT
Start: 2022-10-31 | End: 2022-10-31

## 2022-10-31 RX ORDER — ATORVASTATIN CALCIUM 40 MG/1
80 TABLET, FILM COATED ORAL NIGHTLY
Status: CANCELLED | OUTPATIENT
Start: 2022-10-31

## 2022-10-31 RX ORDER — BISACODYL 10 MG
10 SUPPOSITORY, RECTAL RECTAL DAILY PRN
Status: DISCONTINUED | OUTPATIENT
Start: 2022-10-31 | End: 2022-11-03 | Stop reason: HOSPADM

## 2022-10-31 RX ORDER — HEPARIN SODIUM 5000 [USP'U]/ML
5000 INJECTION, SOLUTION INTRAVENOUS; SUBCUTANEOUS EVERY 8 HOURS SCHEDULED
Status: DISCONTINUED | OUTPATIENT
Start: 2022-10-31 | End: 2022-11-02

## 2022-10-31 RX ORDER — SODIUM CHLORIDE 0.9 % (FLUSH) 0.9 %
10 SYRINGE (ML) INJECTION EVERY 12 HOURS SCHEDULED
Status: CANCELLED | OUTPATIENT
Start: 2022-10-31

## 2022-10-31 RX ORDER — POTASSIUM CHLORIDE 750 MG/1
40 CAPSULE, EXTENDED RELEASE ORAL AS NEEDED
Status: DISCONTINUED | OUTPATIENT
Start: 2022-10-31 | End: 2022-11-03 | Stop reason: HOSPADM

## 2022-10-31 RX ORDER — NICOTINE POLACRILEX 4 MG
15 LOZENGE BUCCAL
Status: DISCONTINUED | OUTPATIENT
Start: 2022-10-31 | End: 2022-11-03 | Stop reason: HOSPADM

## 2022-10-31 RX ORDER — DIPHENHYDRAMINE HYDROCHLORIDE 50 MG/ML
25 INJECTION INTRAMUSCULAR; INTRAVENOUS ONCE
Status: COMPLETED | OUTPATIENT
Start: 2022-10-31 | End: 2022-10-31

## 2022-10-31 RX ADMIN — HEPARIN SODIUM 5000 UNITS: 5000 INJECTION INTRAVENOUS; SUBCUTANEOUS at 21:04

## 2022-10-31 RX ADMIN — KETOROLAC TROMETHAMINE 30 MG: 30 INJECTION, SOLUTION INTRAMUSCULAR; INTRAVENOUS at 08:23

## 2022-10-31 RX ADMIN — DIPHENHYDRAMINE HYDROCHLORIDE 25 MG: 50 INJECTION INTRAMUSCULAR; INTRAVENOUS at 08:21

## 2022-10-31 RX ADMIN — HYDROMORPHONE HYDROCHLORIDE 0.25 MG: 1 INJECTION, SOLUTION INTRAMUSCULAR; INTRAVENOUS; SUBCUTANEOUS at 08:25

## 2022-10-31 RX ADMIN — ASPIRIN 81 MG CHEWABLE TABLET 81 MG: 81 TABLET CHEWABLE at 10:55

## 2022-10-31 RX ADMIN — LOSARTAN POTASSIUM 50 MG: 50 TABLET, FILM COATED ORAL at 18:40

## 2022-10-31 RX ADMIN — CLOPIDOGREL BISULFATE 300 MG: 75 TABLET ORAL at 10:55

## 2022-10-31 RX ADMIN — SODIUM CHLORIDE 500 ML: 9 INJECTION, SOLUTION INTRAVENOUS at 08:28

## 2022-10-31 RX ADMIN — IOPAMIDOL 125 ML: 755 INJECTION, SOLUTION INTRAVENOUS at 09:19

## 2022-10-31 RX ADMIN — SODIUM CHLORIDE 1 G: 900 INJECTION INTRAVENOUS at 12:45

## 2022-10-31 RX ADMIN — ACETAMINOPHEN 1000 MG: 500 TABLET ORAL at 21:04

## 2022-10-31 RX ADMIN — AMLODIPINE BESYLATE 5 MG: 5 TABLET ORAL at 18:40

## 2022-10-31 RX ADMIN — ATORVASTATIN CALCIUM 80 MG: 40 TABLET, FILM COATED ORAL at 21:04

## 2022-10-31 RX ADMIN — INSULIN DETEMIR 15 UNITS: 100 INJECTION, SOLUTION SUBCUTANEOUS at 21:04

## 2022-10-31 RX ADMIN — SODIUM CHLORIDE, POTASSIUM CHLORIDE, SODIUM LACTATE AND CALCIUM CHLORIDE 500 ML: 600; 310; 30; 20 INJECTION, SOLUTION INTRAVENOUS at 16:18

## 2022-10-31 RX ADMIN — ACETAMINOPHEN 1000 MG: 500 TABLET ORAL at 18:40

## 2022-10-31 RX ADMIN — PROCHLORPERAZINE EDISYLATE 10 MG: 5 INJECTION INTRAMUSCULAR; INTRAVENOUS at 08:26

## 2022-10-31 RX ADMIN — POTASSIUM CHLORIDE 40 MEQ: 750 CAPSULE, EXTENDED RELEASE ORAL at 12:45

## 2022-10-31 RX ADMIN — MAGNESIUM SULFATE HEPTAHYDRATE 1 G: 1 INJECTION, SOLUTION INTRAVENOUS at 18:40

## 2022-11-01 ENCOUNTER — APPOINTMENT (OUTPATIENT)
Dept: MRI IMAGING | Facility: HOSPITAL | Age: 47
End: 2022-11-01

## 2022-11-01 ENCOUNTER — READMISSION MANAGEMENT (OUTPATIENT)
Dept: CALL CENTER | Facility: HOSPITAL | Age: 47
End: 2022-11-01

## 2022-11-01 LAB
ADV 40+41 DNA STL QL NAA+NON-PROBE: NOT DETECTED
ANION GAP SERPL CALCULATED.3IONS-SCNC: 11 MMOL/L (ref 5–15)
ASTRO TYP 1-8 RNA STL QL NAA+NON-PROBE: NOT DETECTED
BH CV ECHO MEAS - AO MAX PG: 9 MMHG
BH CV ECHO MEAS - AO MEAN PG: 5 MMHG
BH CV ECHO MEAS - AO ROOT DIAM: 3 CM
BH CV ECHO MEAS - AO V2 MAX: 150 CM/SEC
BH CV ECHO MEAS - AO V2 VTI: 28.6 CM
BH CV ECHO MEAS - AVA(I,D): 2.15 CM2
BH CV ECHO MEAS - EDV(CUBED): 79.5 ML
BH CV ECHO MEAS - EDV(MOD-SP2): 86.4 ML
BH CV ECHO MEAS - EDV(MOD-SP4): 85.5 ML
BH CV ECHO MEAS - EF(MOD-BP): 65.6 %
BH CV ECHO MEAS - EF(MOD-SP2): 68.4 %
BH CV ECHO MEAS - EF(MOD-SP4): 66.3 %
BH CV ECHO MEAS - ESV(CUBED): 19.7 ML
BH CV ECHO MEAS - ESV(MOD-SP2): 27.3 ML
BH CV ECHO MEAS - ESV(MOD-SP4): 28.8 ML
BH CV ECHO MEAS - FS: 37.2 %
BH CV ECHO MEAS - IVS/LVPW: 1 CM
BH CV ECHO MEAS - IVSD: 1.1 CM
BH CV ECHO MEAS - LA DIMENSION: 3.8 CM
BH CV ECHO MEAS - LAT PEAK E' VEL: 9.4 CM/SEC
BH CV ECHO MEAS - LV MASS(C)D: 162.9 GRAMS
BH CV ECHO MEAS - LV MAX PG: 4.2 MMHG
BH CV ECHO MEAS - LV MEAN PG: 2 MMHG
BH CV ECHO MEAS - LV V1 MAX: 103 CM/SEC
BH CV ECHO MEAS - LV V1 VTI: 19.6 CM
BH CV ECHO MEAS - LVIDD: 4.3 CM
BH CV ECHO MEAS - LVIDS: 2.7 CM
BH CV ECHO MEAS - LVOT AREA: 3.1 CM2
BH CV ECHO MEAS - LVOT DIAM: 2 CM
BH CV ECHO MEAS - LVPWD: 1.1 CM
BH CV ECHO MEAS - MED PEAK E' VEL: 5.9 CM/SEC
BH CV ECHO MEAS - MV A MAX VEL: 104 CM/SEC
BH CV ECHO MEAS - MV DEC TIME: 0.21 MSEC
BH CV ECHO MEAS - MV E MAX VEL: 83.3 CM/SEC
BH CV ECHO MEAS - MV E/A: 0.8
BH CV ECHO MEAS - PA ACC TIME: 0.07 SEC
BH CV ECHO MEAS - PA PR(ACCEL): 45.7 MMHG
BH CV ECHO MEAS - PA V2 MAX: 114 CM/SEC
BH CV ECHO MEAS - RAP SYSTOLE: 3 MMHG
BH CV ECHO MEAS - RVSP: 26 MMHG
BH CV ECHO MEAS - SV(LVOT): 61.6 ML
BH CV ECHO MEAS - SV(MOD-SP2): 59.1 ML
BH CV ECHO MEAS - SV(MOD-SP4): 56.7 ML
BH CV ECHO MEAS - TAPSE (>1.6): 2.13 CM
BH CV ECHO MEAS - TR MAX PG: 23.2 MMHG
BH CV ECHO MEAS - TR MAX VEL: 241 CM/SEC
BH CV ECHO MEASUREMENTS AVERAGE E/E' RATIO: 10.89
BH CV VAS BP LEFT ARM: NORMAL MMHG
BH CV XLRA - RV BASE: 3.4 CM
BH CV XLRA - RV LENGTH: 5.5 CM
BH CV XLRA - RV MID: 2.2 CM
BH CV XLRA - TDI S': 18.5 CM/SEC
BUN SERPL-MCNC: 9 MG/DL (ref 6–20)
BUN/CREAT SERPL: 15.8 (ref 7–25)
C CAYETANENSIS DNA STL QL NAA+NON-PROBE: NOT DETECTED
C COLI+JEJ+UPSA DNA STL QL NAA+NON-PROBE: NOT DETECTED
C DIFF TOX GENS STL QL NAA+PROBE: NOT DETECTED
CALCIUM SPEC-SCNC: 8.8 MG/DL (ref 8.6–10.5)
CHLORIDE SERPL-SCNC: 105 MMOL/L (ref 98–107)
CHOLEST SERPL-MCNC: 104 MG/DL (ref 0–200)
CO2 SERPL-SCNC: 20 MMOL/L (ref 22–29)
CREAT SERPL-MCNC: 0.57 MG/DL (ref 0.57–1)
CRYPTOSP DNA STL QL NAA+NON-PROBE: NOT DETECTED
E HISTOLYT DNA STL QL NAA+NON-PROBE: NOT DETECTED
EAEC PAA PLAS AGGR+AATA ST NAA+NON-PRB: NOT DETECTED
EC STX1+STX2 GENES STL QL NAA+NON-PROBE: NOT DETECTED
EGFRCR SERPLBLD CKD-EPI 2021: 111.6 ML/MIN/1.73
EPEC EAE GENE STL QL NAA+NON-PROBE: NOT DETECTED
ETEC LTA+ST1A+ST1B TOX ST NAA+NON-PROBE: NOT DETECTED
G LAMBLIA DNA STL QL NAA+NON-PROBE: NOT DETECTED
GLUCOSE BLDC GLUCOMTR-MCNC: 182 MG/DL (ref 70–130)
GLUCOSE BLDC GLUCOMTR-MCNC: 199 MG/DL (ref 70–130)
GLUCOSE BLDC GLUCOMTR-MCNC: 234 MG/DL (ref 70–130)
GLUCOSE BLDC GLUCOMTR-MCNC: 242 MG/DL (ref 70–130)
GLUCOSE BLDC GLUCOMTR-MCNC: 286 MG/DL (ref 70–130)
GLUCOSE BLDC GLUCOMTR-MCNC: 322 MG/DL (ref 70–130)
GLUCOSE SERPL-MCNC: 242 MG/DL (ref 65–99)
HDLC SERPL-MCNC: 21 MG/DL (ref 40–60)
LDLC SERPL CALC-MCNC: 43 MG/DL (ref 0–100)
LDLC/HDLC SERPL: 1.5 {RATIO}
LEFT ATRIUM VOLUME INDEX: 23.6 ML/M2
MAXIMAL PREDICTED HEART RATE: 171 BPM
NOROVIRUS GI+II RNA STL QL NAA+NON-PROBE: NOT DETECTED
P SHIGELLOIDES DNA STL QL NAA+NON-PROBE: NOT DETECTED
PA ADP PRP-ACNC: 183 PRU
POTASSIUM SERPL-SCNC: 4 MMOL/L (ref 3.5–5.2)
QT INTERVAL: 354 MS
QTC INTERVAL: 444 MS
RVA RNA STL QL NAA+NON-PROBE: NOT DETECTED
S ENT+BONG DNA STL QL NAA+NON-PROBE: NOT DETECTED
SAPO I+II+IV+V RNA STL QL NAA+NON-PROBE: NOT DETECTED
SHIGELLA SP+EIEC IPAH ST NAA+NON-PROBE: NOT DETECTED
SODIUM SERPL-SCNC: 136 MMOL/L (ref 136–145)
STRESS TARGET HR: 145 BPM
TRIGL SERPL-MCNC: 258 MG/DL (ref 0–150)
V CHOL+PARA+VUL DNA STL QL NAA+NON-PROBE: NOT DETECTED
V CHOLERAE DNA STL QL NAA+NON-PROBE: NOT DETECTED
VLDLC SERPL-MCNC: 40 MG/DL (ref 5–40)
Y ENTEROCOL DNA STL QL NAA+NON-PROBE: NOT DETECTED

## 2022-11-01 PROCEDURE — 96372 THER/PROPH/DIAG INJ SC/IM: CPT

## 2022-11-01 PROCEDURE — 25010000002 HEPARIN (PORCINE) PER 1000 UNITS: Performed by: INTERNAL MEDICINE

## 2022-11-01 PROCEDURE — 63710000001 INSULIN DETEMIR PER 5 UNITS: Performed by: INTERNAL MEDICINE

## 2022-11-01 PROCEDURE — 85576 BLOOD PLATELET AGGREGATION: CPT

## 2022-11-01 PROCEDURE — 99225 PR SBSQ OBSERVATION CARE/DAY 25 MINUTES: CPT | Performed by: INTERNAL MEDICINE

## 2022-11-01 PROCEDURE — 97165 OT EVAL LOW COMPLEX 30 MIN: CPT

## 2022-11-01 PROCEDURE — 63710000001 INSULIN LISPRO (HUMAN) PER 5 UNITS: Performed by: INTERNAL MEDICINE

## 2022-11-01 PROCEDURE — G0378 HOSPITAL OBSERVATION PER HR: HCPCS

## 2022-11-01 PROCEDURE — 87493 C DIFF AMPLIFIED PROBE: CPT | Performed by: NURSE PRACTITIONER

## 2022-11-01 PROCEDURE — 96366 THER/PROPH/DIAG IV INF ADDON: CPT

## 2022-11-01 PROCEDURE — 99214 OFFICE O/P EST MOD 30 MIN: CPT | Performed by: NURSE PRACTITIONER

## 2022-11-01 PROCEDURE — 70551 MRI BRAIN STEM W/O DYE: CPT

## 2022-11-01 PROCEDURE — 82962 GLUCOSE BLOOD TEST: CPT

## 2022-11-01 PROCEDURE — G0108 DIAB MANAGE TRN  PER INDIV: HCPCS | Performed by: REGISTERED NURSE

## 2022-11-01 PROCEDURE — 87507 IADNA-DNA/RNA PROBE TQ 12-25: CPT | Performed by: NURSE PRACTITIONER

## 2022-11-01 PROCEDURE — 80048 BASIC METABOLIC PNL TOTAL CA: CPT | Performed by: INTERNAL MEDICINE

## 2022-11-01 PROCEDURE — 80061 LIPID PANEL: CPT | Performed by: NURSE PRACTITIONER

## 2022-11-01 PROCEDURE — 25010000002 CEFTRIAXONE PER 250 MG: Performed by: INTERNAL MEDICINE

## 2022-11-01 RX ORDER — PREGABALIN 150 MG/1
300 CAPSULE ORAL EVERY 12 HOURS SCHEDULED
Status: DISCONTINUED | OUTPATIENT
Start: 2022-11-01 | End: 2022-11-03 | Stop reason: HOSPADM

## 2022-11-01 RX ORDER — DIPHENOXYLATE HYDROCHLORIDE AND ATROPINE SULFATE 2.5; .025 MG/1; MG/1
1 TABLET ORAL 4 TIMES DAILY PRN
Status: DISCONTINUED | OUTPATIENT
Start: 2022-11-01 | End: 2022-11-03 | Stop reason: HOSPADM

## 2022-11-01 RX ADMIN — INSULIN LISPRO 2 UNITS: 100 INJECTION, SOLUTION INTRAVENOUS; SUBCUTANEOUS at 16:55

## 2022-11-01 RX ADMIN — SENNOSIDES AND DOCUSATE SODIUM 2 TABLET: 50; 8.6 TABLET ORAL at 21:09

## 2022-11-01 RX ADMIN — CLOPIDOGREL BISULFATE 75 MG: 75 TABLET ORAL at 09:53

## 2022-11-01 RX ADMIN — AMLODIPINE BESYLATE 5 MG: 5 TABLET ORAL at 09:53

## 2022-11-01 RX ADMIN — ACETAMINOPHEN 1000 MG: 500 TABLET ORAL at 15:07

## 2022-11-01 RX ADMIN — LOSARTAN POTASSIUM 50 MG: 50 TABLET, FILM COATED ORAL at 09:53

## 2022-11-01 RX ADMIN — PANTOPRAZOLE SODIUM 40 MG: 40 TABLET, DELAYED RELEASE ORAL at 06:23

## 2022-11-01 RX ADMIN — HEPARIN SODIUM 5000 UNITS: 5000 INJECTION INTRAVENOUS; SUBCUTANEOUS at 15:07

## 2022-11-01 RX ADMIN — ACETAMINOPHEN 1000 MG: 500 TABLET ORAL at 21:09

## 2022-11-01 RX ADMIN — INSULIN LISPRO 5 UNITS: 100 INJECTION, SOLUTION INTRAVENOUS; SUBCUTANEOUS at 12:35

## 2022-11-01 RX ADMIN — PREGABALIN 300 MG: 150 CAPSULE ORAL at 21:09

## 2022-11-01 RX ADMIN — INSULIN DETEMIR 15 UNITS: 100 INJECTION, SOLUTION SUBCUTANEOUS at 21:10

## 2022-11-01 RX ADMIN — DIPHENOXYLATE HYDROCHLORIDE AND ATROPINE SULFATE 1 TABLET: 2.5; .025 TABLET ORAL at 12:46

## 2022-11-01 RX ADMIN — PREGABALIN 300 MG: 150 CAPSULE ORAL at 11:31

## 2022-11-01 RX ADMIN — HEPARIN SODIUM 5000 UNITS: 5000 INJECTION INTRAVENOUS; SUBCUTANEOUS at 21:08

## 2022-11-01 RX ADMIN — DIPHENOXYLATE HYDROCHLORIDE AND ATROPINE SULFATE 1 TABLET: 2.5; .025 TABLET ORAL at 18:06

## 2022-11-01 RX ADMIN — INSULIN LISPRO 3 UNITS: 100 INJECTION, SOLUTION INTRAVENOUS; SUBCUTANEOUS at 10:05

## 2022-11-01 RX ADMIN — ACETAMINOPHEN 1000 MG: 500 TABLET ORAL at 09:53

## 2022-11-01 RX ADMIN — ASPIRIN 81 MG CHEWABLE TABLET 81 MG: 81 TABLET CHEWABLE at 09:53

## 2022-11-01 RX ADMIN — SODIUM CHLORIDE 1 G: 900 INJECTION INTRAVENOUS at 21:09

## 2022-11-01 RX ADMIN — Medication 10 ML: at 21:10

## 2022-11-01 RX ADMIN — ATORVASTATIN CALCIUM 80 MG: 40 TABLET, FILM COATED ORAL at 21:09

## 2022-11-01 RX ADMIN — HEPARIN SODIUM 5000 UNITS: 5000 INJECTION INTRAVENOUS; SUBCUTANEOUS at 06:23

## 2022-11-01 NOTE — PLAN OF CARE
Problem: Adult Inpatient Plan of Care  Goal: Plan of Care Review  Recent Flowsheet Documentation  Taken 11/1/2022 1036 by Boni Ferris OT  Progress: (OT eval) no change  Plan of Care Reviewed With: patient  Outcome Evaluation: OT eval completed. Pt presents at baseline for ADL completion w/ symmetrical BUE strength and coordination/sensation intact. Pt report R eye vision blurred, WFL for safe ADL completion and related mobility. OT signing off, please re-consult if needed. Rec d/c to home when medically appropriate.

## 2022-11-01 NOTE — PROGRESS NOTES
[facility] Medicine Services  PROGRESS NOTE    Patient Name: Rachana Patrick  : 1975  MRN: [id number]    Date of Admission: 10/31/2022  Primary Care Physician: Provider, No Known    Subjective   Subjective     CC:  HA    HPI:  Resting in bed in no acute distress and tells me that she feels better.  No fever or chills.  No chest pain or palpitation.  Still has blurry vision in the left eye.  No nausea vomiting or diarrhea.    ROS:  As of    Objective   Objective     Vital Signs:   Temp:  [97.7 °F (36.5 °C)-98.2 °F (36.8 °C)] 97.7 °F (36.5 °C)  Heart Rate:  [79-92] 79  Resp:  [18-20] 18  BP: (114-135)/(68-80) 134/75     Physical Exam:  Constitutional: No acute distress  HENT: NCAT, mucous membranes moist, left eye blurry vision  Respiratory: Clear to auscultation bilaterally, respiratory effort normal   Cardiovascular: RRR, no murmurs, rubs, or gallops  Gastrointestinal: Abdomen is obese but positive bowel sounds, soft, nontender, nondistended  Musculoskeletal: No bilateral ankle edema  Psychiatric: Appropriate affect, cooperative  Neurologic: Oriented x 3, strength symmetric in all extremities, Cranial Nerves grossly intact to confrontation, speech clear  Skin: No rashes    Results Reviewed:  LAB RESULTS:      Lab 10/31/22  1214 10/31/22  0851 10/31/22  0821   WBC  --   --  18.14*   HEMOGLOBIN  --   --  15.6   HEMATOCRIT  --   --  45.5   PLATELETS  --   --  208   NEUTROS ABS  --   --  15.72*   IMMATURE GRANS (ABS)  --   --  0.09*   LYMPHS ABS  --   --  1.35   MONOS ABS  --   --  0.88   EOS ABS  --   --  0.05   MCV  --   --  87.5   SED RATE  --   --  40*   CRP  --  12.85*  --    LACTATE 1.0  --   --    PROTIME  --   --  14.7*   APTT  --   --  32.7*         Lab 22  0716 10/31/22  0900 10/31/22  0857 10/31/22  0851   SODIUM 136  --   --  127*   POTASSIUM 4.0  --   --  3.1*   CHLORIDE 105  --   --  97*   CO2 20.0*  --   --  21.0*   ANION GAP 11.0  --   --  9.0   BUN 9  --   --  9    CREATININE 0.57 0.60 0.60 0.61   EGFR 111.6  --   --  109.8   GLUCOSE 242*  --   --  272*   CALCIUM 8.8  --   --  8.7         Lab 10/31/22  0851   TOTAL PROTEIN 6.7   ALBUMIN 3.50   GLOBULIN 3.2   ALT (SGPT) 29   AST (SGOT) 33*   BILIRUBIN 0.3   ALK PHOS 92         Lab 10/31/22  0821   PROTIME 14.7*   INR 1.16*         Lab 11/01/22  0716   CHOLESTEROL 104   LDL CHOL 43   HDL CHOL 21*   TRIGLYCERIDES 258*             Brief Urine Lab Results  (Last result in the past 365 days)      Color   Clarity   Blood   Leuk Est   Nitrite   Protein   CREAT   Urine HCG        10/31/22 0815 Yellow   Cloudy   Negative   Small (1+)   Positive   30 mg/dL (1+)           10/31/22 0815               Negative             Microbiology Results Abnormal     Procedure Component Value - Date/Time    Gastrointestinal Panel, PCR - Stool, Per Rectum [[id number]]  (Normal) Collected: 11/01/22 0332    Lab Status: Final result Specimen: Stool from Per Rectum Updated: 11/01/22 0909     Campylobacter Not Detected     Plesiomonas shigelloides Not Detected     Salmonella Not Detected     Vibrio Not Detected     Vibrio cholerae Not Detected     Yersinia enterocolitica Not Detected     Enteroaggregative E. coli (EAEC) Not Detected     Enteropathogenic E. coli (EPEC) Not Detected     Enterotoxigenic E. coli (ETEC) lt/st Not Detected     Shiga-like toxin-producing E. coli (STEC) stx1/stx2 Not Detected     Shigella/Enteroinvasive E. coli (EIEC) Not Detected     Cryptosporidium Not Detected     Cyclospora cayetanensis Not Detected     Entamoeba histolytica Not Detected     Giardia lamblia Not Detected     Adenovirus F40/41 Not Detected     Astrovirus Not Detected     Norovirus GI/GII Not Detected     Rotavirus A Not Detected     Sapovirus (I, II, IV or V) Not Detected    Clostridioides difficile Toxin - Stool, Per Rectum [[id number]]  (Normal) Collected: 11/01/22 0332    Lab Status: Final result Specimen: Stool from Per Rectum Updated: 11/01/22 0844     Narrative:      The following orders were created for panel order Clostridioides difficile Toxin - Stool, Per Rectum.  Procedure                               Abnormality         Status                     ---------                               -----------         ------                     Clostridioides difficile...[[id number]]  Normal              Final result                 Please view results for these tests on the individual orders.    Clostridioides difficile Toxin, PCR - Stool, Per Rectum [[id number]]  (Normal) Collected: 11/01/22 0332    Lab Status: Final result Specimen: Stool from Per Rectum Updated: 11/01/22 0844     C. Difficile Toxins by PCR Not Detected    Narrative:      The result indicates the absence of toxigenic C. difficile from stool specimen.     COVID PRE-OP / PRE-PROCEDURE SCREENING ORDER (NO ISOLATION) - Swab, Nasopharynx [[id number]]  (Normal) Collected: 10/31/22 0821    Lab Status: Final result Specimen: Swab from Nasopharynx Updated: 10/31/22 0909    Narrative:      The following orders were created for panel order COVID PRE-OP / PRE-PROCEDURE SCREENING ORDER (NO ISOLATION) - Swab, Nasopharynx.  Procedure                               Abnormality         Status                     ---------                               -----------         ------                     COVID-19 and FLU A/B PCR...[[id number]]  Normal              Final result                 Please view results for these tests on the individual orders.    COVID-19 and FLU A/B PCR - Swab, Nasopharynx [[id number]]  (Normal) Collected: 10/31/22 0821    Lab Status: Final result Specimen: Swab from Nasopharynx Updated: 10/31/22 0909     COVID19 Not Detected     Influenza A PCR Not Detected     Influenza B PCR Not Detected    Narrative:      Fact sheet for providers: https://www.fda.gov/media/070141/download    Fact sheet for patients: https://www.fda.gov/media/594929/download    Test performed by PCR.          Adult Transthoracic  Echo Complete W/ Cont if Necessary Per Protocol (With Agitated Saline)    Result Date: 11/1/2022  •  Left ventricular systolic function is normal. Left ventricular ejection fraction appears to be 66 - 70%. •  Saline test results are negative. •  There is calcification of the aortic valve. •  Estimated right ventricular systolic pressure from tricuspid regurgitation is normal (<35 mmHg). Calculated right ventricular systolic pressure from tricuspid regurgitation is 26 mmHg.     CT Head Without Contrast    Result Date: 10/31/2022   DATE OF EXAM: 10/31/2022 9:13 AM  PROCEDURE: CT HEAD WO CONTRAST-  INDICATIONS: severe headache;  s/p recent left thalamic stroke  COMPARISON: No Comparisons Available  TECHNIQUE: Routine transaxial cuts were obtained through the head without the administration of contrast. Automated exposure control and iterative reconstruction methods were used.  FINDINGS: There is no underlying hemorrhage. There is no midline shift. The ventricles are not dilated. The reported areas of restricted diffusion involving the left cerebral hemisphere on more recent MRI the brain which is not available for comparison is not well-defined on this exam. There is some hypodensity in the left thalamic area which may relate to subacute infarct. There is no underlying hemorrhage.      Impression: 1.  Hypodensity within the left thalamic area that could relate to a subacute infarct given history of restricted diffusion in this area on more recent MR which is not available for comparison from outside institution.  This report was finalized on 10/31/2022 9:38 AM by Chico Herron MD.      CT Angiogram Neck    Result Date: 10/31/2022  EXAMINATION: CT ANGIOGRAM HEAD W AI ANALYSIS OF LVO-, CT ANGIOGRAM NECK-   INDICATION: Severe headaches, recent left thalamic stroke. The patient had an outside CT angiogram raising question of a right vertebral artery dissection.  TECHNIQUE: CTA of the head was performed after the intravenous  administration of 125 mL of Isovue 370. Reconstructed coronal and sagittal images were also obtained. In addition, a 3-D volume rendered image was obtained after post processing. Automated exposure control and iterative reconstruction methods were used.  The radiation dose reduction device was turned on for each scan per the ALARA (As Low as Reasonably Achievable) protocol.  AI analysis of LVO was utilized.  Stenosis measurement was performed by the NASCET or similar method.  COMPARISON: CT of the head performed on 10/31/2022. The outside CT angiogram of the head and neck is unavailable.  FINDINGS: Vascular: There is no large vessel occlusion or filling defect indicate cerebral thrombus. The anterior, middle, and posterior cerebral arteries are patent. The basilar artery is patent. There is no saccular aneurysm or arteriovenous malformation. There is discontinuous flow seen in the right vertebral artery. The patent portions of the right vertebral artery are also small in caliber. This could be on the basis of a chronic dissection versus hypoplasia with atherosclerotic vascular disease causing the areas of occlusion. The left vertebral artery originates from the proximal left subclavian artery and is widely patent. It is the dominant blood supply to the posterior circulation. There is a bulky calcified plaque at both carotid bifurcations. The degree of stenosis in the proximal right internal carotid artery measures 52%. The degree of stenosis in the proximal left internal carotid artery measures 23%. There is also calcified plaque in the thoracic aortic arch with involvement of the innominate, proximal right subclavian artery, and the proximal left subclavian artery. There is no hemodynamically significant stenosis.  Nonvascular: There is no abnormal intracranial contrast enhancement. There are no enlarged cervical lymph nodes. The parotid and submandibular glands are unremarkable. The nasopharynx, palatine tonsils,  epiglottis, piriform sinuses, and larynx are unremarkable. The thyroid gland is not enlarged. The pulmonary apices are clear. There are no suspicious osteolytic or sclerotic lesions within the bony structures.      Impression:  1. No large vessel intracranial arterial occlusion or filling defect to indicate cerebral thrombus. 2. Small caliber right vertebral artery with discontinuous flow. This could be on the basis of a chronic dissection versus hypoplasia with the atherosclerotic vascular disease causing areas of occlusion. 3. The left vertebral artery is dominant. It is widely patent. 4. Bilateral carotid artery stenosis. It measures 52% on the right side and 23% on the left side. 5. Additional vascular and nonvascular findings as noted above.  This report was finalized on 10/31/2022 9:56 AM by Ruben Garcia MD.      MRI Brain Without Contrast    Result Date: 11/1/2022  DATE OF EXAM: 11/1/2022 8:30 AM  PROCEDURE: MRI BRAIN WO CONTRAST-  INDICATIONS: Stroke, follow up; R41.82-Altered mental status, unspecified; N39.0-Urinary tract infection, site not specified; E87.6-Hypokalemia; R51.9-Headache, unspecified; I63.81-Other cerebral infarction due to occlusion or stenosis of small artery  COMPARISON: Head CT 10/31/2022  TECHNIQUE: Multiplanar multisequence images of the brain were performed without contrast according to routine brain MRI protocol.  FINDINGS: There are small scattered acute lacunar infarcts of the left thalamus, left caudate body, and splenium of the left corpus callosum (series 3 image [id number]). No intracranial hemorrhage. No intracranial mass. Other than the above-described regions of acute lacunar infarcts, gray-white differentiation appears grossly preserved. No extra-axial collections. No midline shift or herniation. Normal size and configuration of the ventricles. The cerebellopontine angles and midline structures are normal. The major intracranial vascular flow voids are preserved. Normal  appearance of the orbits. There is mild mucosal thickening in the right maxillary sinus. The mastoid air cells are clear. No acute or suspicious bony findings.      Impression: Small scattered acute lacunar infarcts of the left thalamus, left caudate body, and left splenium of the corpus callosum.  Findings discussed with stroke navigator by Dr. Harrison Turk via telephone at 8:57 AM 11/1/2022.  This report was finalized on 11/1/2022 8:57 AM by Harrison Turk MD.      CT Angiogram Head w AI Analysis of LVO    Result Date: 10/31/2022  EXAMINATION: CT ANGIOGRAM HEAD W AI ANALYSIS OF LVO-, CT ANGIOGRAM NECK-   INDICATION: Severe headaches, recent left thalamic stroke. The patient had an outside CT angiogram raising question of a right vertebral artery dissection.  TECHNIQUE: CTA of the head was performed after the intravenous administration of 125 mL of Isovue 370. Reconstructed coronal and sagittal images were also obtained. In addition, a 3-D volume rendered image was obtained after post processing. Automated exposure control and iterative reconstruction methods were used.  The radiation dose reduction device was turned on for each scan per the ALARA (As Low as Reasonably Achievable) protocol.  AI analysis of LVO was utilized.  Stenosis measurement was performed by the NASCET or similar method.  COMPARISON: CT of the head performed on 10/31/2022. The outside CT angiogram of the head and neck is unavailable.  FINDINGS: Vascular: There is no large vessel occlusion or filling defect indicate cerebral thrombus. The anterior, middle, and posterior cerebral arteries are patent. The basilar artery is patent. There is no saccular aneurysm or arteriovenous malformation. There is discontinuous flow seen in the right vertebral artery. The patent portions of the right vertebral artery are also small in caliber. This could be on the basis of a chronic dissection versus hypoplasia with atherosclerotic vascular disease causing  the areas of occlusion. The left vertebral artery originates from the proximal left subclavian artery and is widely patent. It is the dominant blood supply to the posterior circulation. There is a bulky calcified plaque at both carotid bifurcations. The degree of stenosis in the proximal right internal carotid artery measures 52%. The degree of stenosis in the proximal left internal carotid artery measures 23%. There is also calcified plaque in the thoracic aortic arch with involvement of the innominate, proximal right subclavian artery, and the proximal left subclavian artery. There is no hemodynamically significant stenosis.  Nonvascular: There is no abnormal intracranial contrast enhancement. There are no enlarged cervical lymph nodes. The parotid and submandibular glands are unremarkable. The nasopharynx, palatine tonsils, epiglottis, piriform sinuses, and larynx are unremarkable. The thyroid gland is not enlarged. The pulmonary apices are clear. There are no suspicious osteolytic or sclerotic lesions within the bony structures.      Impression:  1. No large vessel intracranial arterial occlusion or filling defect to indicate cerebral thrombus. 2. Small caliber right vertebral artery with discontinuous flow. This could be on the basis of a chronic dissection versus hypoplasia with the atherosclerotic vascular disease causing areas of occlusion. 3. The left vertebral artery is dominant. It is widely patent. 4. Bilateral carotid artery stenosis. It measures 52% on the right side and 23% on the left side. 5. Additional vascular and nonvascular findings as noted above.  This report was finalized on 10/31/2022 9:56 AM by Ruben Garcia MD.        Results for orders placed during the hospital encounter of 10/31/22    Adult Transthoracic Echo Complete W/ Cont if Necessary Per Protocol (With Agitated Saline)    Interpretation Summary  •  Left ventricular systolic function is normal. Left ventricular ejection fraction  appears to be 66 - 70%.  •  Saline test results are negative.  •  There is calcification of the aortic valve.  •  Estimated right ventricular systolic pressure from tricuspid regurgitation is normal (<35 mmHg). Calculated right ventricular systolic pressure from tricuspid regurgitation is 26 mmHg.      I have reviewed the medications:  Scheduled Meds:acetaminophen, 1,000 mg, Oral, TID  amLODIPine, 5 mg, Oral, Daily  aspirin, 81 mg, Oral, Daily   Or  aspirin, 300 mg, Rectal, Daily  atorvastatin, 80 mg, Oral, Nightly  cefTRIAXone, 1 g, Intravenous, Q24H  clopidogrel, 75 mg, Oral, Daily  heparin (porcine), 5,000 Units, Subcutaneous, Q8H  insulin detemir, 15 Units, Subcutaneous, Nightly  insulin lispro, 0-7 Units, Subcutaneous, TID AC  losartan, 50 mg, Oral, Daily  pantoprazole, 40 mg, Oral, QAM  pregabalin, 300 mg, Oral, Q12H  senna-docusate sodium, 2 tablet, Oral, BID  sodium chloride, 10 mL, Intravenous, Q12H  sodium chloride, 10 mL, Intravenous, Q12H      Continuous Infusions:   PRN Meds:.•  Albuterol Sulfate NEB Orderable  •  senna-docusate sodium **AND** polyethylene glycol **AND** bisacodyl **AND** bisacodyl  •  dextrose  •  dextrose  •  diphenoxylate-atropine  •  glucagon (human recombinant)  •  ketorolac  •  metoclopramide  •  potassium chloride **OR** potassium chloride **OR** potassium chloride  •  sodium chloride  •  sodium chloride    Assessment & Plan   Assessment & Plan     Active Hospital Problems    Diagnosis  POA   • **Altered mental status, unspecified altered mental status type [R41.82]  Yes   • Headache [R51.9]  Yes   • Recent cerebrovascular accident (CVA) [Z86.73]  Yes   • Blurry vision, right eye [H53.8]  Yes   • Type 2 diabetes mellitus (HCC) [E11.9]  Yes   • Essential hypertension [I10]  Yes   • Mixed hyperlipidemia [E78.2]  Yes   • Diabetic retinopathy associated with type 2 diabetes mellitus (HCC) [E11.319]  Yes      Resolved Hospital Problems   No resolved problems to display.        Brief  Hospital Course to date:  Rachana Patrick is a 47 y.o. female with past medical history significant for diabetes mellitus, hypertension, hyperlipidemia, recent CVA with residual right blurry vision.  Patient was admitted for headache.      *Headache and MRI evidence of either new stroke or extension of previous one.  Currently patient feels better.  Stroke neurology is following    *Right eye blurry vision    *Bilateral carotid stenosis shown on CTA.  Patient is currently on aspirin and Plavix.    *Urinary tract infection.  Urine is positive for leukocyte Estrace and nitrite.  Patient has been treated for urinary tract infection recently.  Urine culture pending.        Expected Discharge Location and Transportation: Home  Expected Discharge Date: In couple of days    DVT prophylaxis:  Medical and mechanical DVT prophylaxis orders are present.     AM-PAC 6 Clicks Score (PT): 24 (11/01/22 1000)    CODE STATUS:   Code Status and Medical Interventions:   Ordered at: 10/31/22 1222     Code Status (Patient has no pulse and is not breathing):    CPR (Attempt to Resuscitate)     Medical Interventions (Patient has pulse or is breathing):    Full Support       Lino Damian MD  11/01/22

## 2022-11-01 NOTE — PLAN OF CARE
Goal Outcome Evaluation:      Pt A&Ox4, VSS, NSR to Sinus tachy on tele, RA, NIH 1. Pt refusing IV and states she would like no IV medications in order to not have another IV. Pt expresses intent to leave this AM. Educated pt on plan of care and importance of being compliant with regimen. Pt still having frequent diarrhea. No acute changes this shift.

## 2022-11-01 NOTE — PROGRESS NOTES
Stroke Progress Note       Chief Complaint: Headache, blurred vision    Subjective    Subjective     Subjective:  Refusing IV overnight.  NIH remains 1.  MRI with some extension of previous LPCA stroke.  Headache has resolved    Review of Systems   Constitutional: Negative for chills and fever.   Eyes: Positive for visual disturbance.   Gastrointestinal: Negative for nausea and vomiting.   Genitourinary: Positive for frequency and urgency.            Objective    Objective      Temp:  [97.7 °F (36.5 °C)-98.2 °F (36.8 °C)] 97.7 °F (36.5 °C)  Heart Rate:  [79-92] 79  Resp:  [18-20] 18  BP: (112-135)/(65-80) 134/75    Neurological Exam  Mental Status  Alert. Oriented to person, place, time and situation. Oriented to person, place, and time. Speech is normal. Language is fluent with no aphasia.     Cranial Nerves  CN II: Right visual acuity: finger movement. Left visual acuity: normal.  CN III, IV, VI: Extraocular movements intact bilaterally. Normal lids and orbits bilaterally. Pupils equal round and reactive to light bilaterally.  CN V: Facial sensation is normal.  CN VII: Full and symmetric facial movement.  CN IX, X: Palate elevates symmetrically  CN XI: Shoulder shrug strength is normal.  CN XII: Tongue midline without atrophy or fasciculations.     Motor   Strength is 5/5 throughout all four extremities.     Sensory  Light touch is normal in upper and lower extremities.      Reflexes                                            Right                      Left  Plantar                           Downgoing                Downgoing     Coordination  Right: Finger-to-nose normal.Left: Finger-to-nose normal.     Gait     Not observed.        Physical Exam  Constitutional:       General: She is in acute distress.   HENT:      Head: Normocephalic and atraumatic.   Eyes:      General: Lids are normal.      Extraocular Movements: Extraocular movements intact.      Pupils: Pupils are equal, round, and reactive to light.    Cardiovascular:      Rate and Rhythm: Normal rate and regular rhythm.   Pulmonary:      Effort: Pulmonary effort is normal. No respiratory distress.   Abdominal:      General: There is no distension.      Palpations: Abdomen is soft.   Musculoskeletal:         General: Normal range of motion.      Cervical back: Normal range of motion and neck supple.   Skin:     General: Skin is warm and dry.      Capillary Refill: Capillary refill takes less than 2 seconds.   Neurological:      Mental Status: She is alert and oriented to person, place, and time.      Cranial Nerves: Cranial nerve deficit present.      Sensory: No sensory deficit.      Motor: Motor strength is normal. No weakness.      Coordination: Coordination normal.   Psychiatric:         Speech: Speech normal.          Results Review:    I reviewed the patient's new clinical results.    Results for orders placed during the hospital encounter of 10/31/22    Adult Transthoracic Echo Complete W/ Cont if Necessary Per Protocol (With Agitated Saline)    Interpretation Summary  •  Left ventricular systolic function is normal. Left ventricular ejection fraction appears to be 66 - 70%.  •  Saline test results are negative.  •  There is calcification of the aortic valve.  •  Estimated right ventricular systolic pressure from tricuspid regurgitation is normal (<35 mmHg). Calculated right ventricular systolic pressure from tricuspid regurgitation is 26 mmHg.            Assessment/Plan     Assessment/Plan:  47 yr old with a PMH significant for stroke (BHR 10/22, right eye blurred vision), bilateral carotid disease, HTN, HLD,T2DM (uncontrolled), tobacco abuse, and marijuana use who presents to the [facility] ED on 10/31 with c/o global severe headache 10/10 and blurred vision in her right eye.    Headache, recent LPCA stroke  -NIH 1 residual visual deficits  -MRI with some extension of previous LPCA stroke  -TTE with no PFO, no LAE  -Continue DAPT with aspirin and  Plavix  -P2Y12   -Continue high-dose statin  -Goal serum glucose less than 140. HgbA1c  Goal <7.   -Continued visual deficits; recommend follow-up with [facility].  No driving until cleared.  -HTN; Goal SBP <140.   -UTI; management per primary team  -Headache; continue magnesium 400 mg daily  -Stroke clinic follow-up ion 12/12  -Plan discussed with the patient, Dr. Bradshaw, primary team, nursing staff.  Stroke Neurology will sign off and see the patient in clinic. Please call with any questions or concerns.     LAZARA Morales, AGACNP-BC  11/01/22  08:12 EDT

## 2022-11-01 NOTE — CONSULTS
Ms. Patrick gave permission for diabetes education.She reports she was diagnosed with T2DM in her 20s. She states she checks her BG 4 X daily. Her numbers are 220s-250s and in 300s in the past week. She occasionally has lows in 40s-50s. Her current A1c is 9.9%. She reports it was 13% about one year ago. She states she takes Admelog TID and Levemir daily for glycemic control.   Discussed and taught about type 2 diabetes self-management, risk factors, and importance of blood glucose control to reduce complications. She states she does have neuropathy and issues with her eyes due to diabetes. She has been admitted with CVA.   Target blood glucose readings and A1c goals per ADA were reviewed. Reviewed current A1c and discussed its significance. Signs, symptoms and treatment of hyperglycemia and hypoglycemia were discussed. Instructed specifically about the Rule of 15 to treat hypoglycemia using Teach back.  Lifestyle changes such as physical activity with MD approval and healthy eating were encouraged.   Discussed continuous glucose monitors, specifically Dexcom and Tyson. Gave handout on CGMs and encouraged her to go to 's web sites to view videos on devices. If interested to discuss with her provider.   Ms. Patrick has been scheduled for outpatient diabetes education follow up visit on Thursday 11/10/22 at 3:00 PM via phone. Outpatient staff will provide reminder call prior to appointment. Patient was given reminder card with date and time of appointment and our contact information. Thank you for this referral.

## 2022-11-01 NOTE — THERAPY DISCHARGE NOTE
Acute Care - Occupational Therapy Discharge  [facility]    Patient Name: Rachana Patrick  : 1975    MRN: [id number]                              Today's Date: 2022       Admit Date: 10/31/2022    Visit Dx:     ICD-10-CM ICD-9-CM   1. Altered mental status, unspecified altered mental status type  R41.82 780.97   2. Urinary tract infection without hematuria, site unspecified  N39.0 599.0   3. Hypokalemia  E87.6 276.8   4. Occipital headache  R51.9 784.0   5. Left thalamic infarction (HCC)  I63.81 434.91     Patient Active Problem List   Diagnosis   • Type 2 diabetes mellitus with other specified complication (HCC)   • Mixed hyperlipidemia   • Diabetic retinopathy associated with type 2 diabetes mellitus (HCC)   • Essential hypertension   • Tobacco abuse   • Class 1 obesity due to excess calories with serious comorbidity and body mass index (BMI) of 34.0 to 34.9 in adult   • Cerebrovascular accident (CVA), unspecified mechanism (HCC)   • Type 2 diabetes mellitus (HCC)   • Thalamic infarct, acute (HCC)   • Altered mental status, unspecified altered mental status type   • Headache   • Recent cerebrovascular accident (CVA)   • Blurry vision, right eye     Past Medical History:   Diagnosis Date   • Hyperlipidemia    • Hypertension    • Type 2 diabetes mellitus (HCC)      History reviewed. No pertinent surgical history.   General Information     Row Name 22 1032          OT Time and Intention    Document Type discharge evaluation/summary  -CS     Mode of Treatment occupational therapy  -CS     Row Name 22 103          General Information    Patient Profile Reviewed yes  -CS     Prior Level of Function independent:;all household mobility;ADL's;driving  -CS     Existing Precautions/Restrictions no known precautions/restrictions;other (see comments)  -CS     Barriers to Rehab visual deficit  -CS     Row Name 22 1032          Living Environment    People in Home significant other  -CS     Row  Name 11/01/22 1032          Home Main Entrance    Number of Stairs, Main Entrance six  -CS     Stair Railings, Main Entrance railings on both sides of stairs  -CS     Row Name 11/01/22 1032          Stairs Within Home, Primary    Stairs, Within Home, Primary Split foyer condo 6 + 6  -CS     Number of Stairs, Within Home, Primary twelve  -CS     Row Name 11/01/22 1032          Safety Issues, Functional Mobility    Safety Issues Affecting Function (Mobility) safety precaution awareness;safety precautions follow-through/compliance  -CS     Impairments Affecting Function (Mobility) visual/perceptual  -CS           User Key  (r) = Recorded By, (t) = Taken By, (c) = Cosigned By    Initials Name Provider Type    CS Boni Ferris OT Occupational Therapist               Mobility/ADL's     Row Name 11/01/22 1033          Bed Mobility    Bed Mobility supine-sit  -CS     Supine-Sit [place] (Bed Mobility) independent  -CS     Comment, (Bed Mobility) no dizziness reported  -     Row Name 11/01/22 1033          Transfers    Transfers sit-stand transfer;stand-sit transfer  -CS     Row Name 11/01/22 1033          Sit-Stand Transfer    Sit-Stand [place] (Transfers) independent  -CS     Row Name 11/01/22 1033          Functional Mobility    Functional Mobility- Comment defer to PT for specifics, no assist required for in-room ADL related mobility. Pt refused socks 2/2 diabetic neuropathy pain - RN present and aware  -     Row Name 11/01/22 1033          Activities of Daily Living    BADL Assessment/Intervention lower body dressing;grooming;feeding  -CS     Row Name 11/01/22 1033          Lower Body Dressing Assessment/Training    [place] Level (Lower Body Dressing) don;pants/bottoms;independent  -CS     Position (Lower Body Dressing) unsupported sitting  -CS     Row Name 11/01/22 1033          Grooming Assessment/Training    [place] Level (Grooming) hair care, combing/brushing;wash face, hands;independent   -CS     Position (Grooming) sink side  -     Row Name 11/01/22 1033          Self-Feeding Assessment/Training    [place] Level (Feeding) feeding skills;liquids to mouth;prepare tray/open items;scoop food and bring to mouth;independent  -CS     Position (Self-Feeding) edge of bed sitting  -           User Key  (r) = Recorded By, (t) = Taken By, (c) = Cosigned By    Initials Name Provider Type     Boni Ferris, OT Occupational Therapist               Obj/Interventions     Row Name 11/01/22 1035          Sensory Assessment (Somatosensory)    Sensory Assessment (Somatosensory) UE sensation intact  -     Row Name 11/01/22 1035          Vision Assessment/Intervention    Visual Impairment/Limitations blurry vision;WFL;other (see comments)  reports blurred R vision when L eye covered  -     Row Name 11/01/22 1035          Range of Motion Comprehensive    General Range of Motion no range of motion deficits identified  -     Row Name 11/01/22 1035          Strength Comprehensive (MMT)    General Manual Muscle Testing (MMT) Assessment no strength deficits identified  -     Comment, General Manual Muscle Testing (MMT) Assessment BUE grossly 5/5, no signficant asymmetry  -     Row Name 11/01/22 1035          Motor Skills    Motor Skills coordination;functional endurance  -     Coordination bilateral;upper extremity;finger to nose;bimanual skills;WFL  -     Functional Endurance O2 sats stable on RA  -     Row Name 11/01/22 1035          Balance    Balance Assessment sitting static balance;sitting dynamic balance;standing static balance;standing dynamic balance  -     Static Sitting Balance independent  -CS     Dynamic Sitting Balance independent  -CS     Position, Sitting Balance unsupported;sitting edge of bed  -     Static Standing Balance independent  -CS     Dynamic Standing Balance independent  -CS     Position/Device Used, Standing Balance unsupported  -CS     Balance Interventions sit to  stand;standing;sitting;occupation based/functional task  -CS     Comment, Balance no LOB during ADL completion  -CS           User Key  (r) = Recorded By, (t) = Taken By, (c) = Cosigned By    Initials Name Provider Type    CS Boni Ferris, OT Occupational Therapist               Goals/Plan    No documentation.                Clinical Impression     Row Name 11/01/22 1036          Pain Assessment    Pretreatment Pain Rating 0/10 - no pain  -CS     Additional Documentation Pain Scale: FACES Pre/Post-Treatment (Group)  -     Row Name 11/01/22 1036          Pain Scale: FACES Pre/Post-Treatment    Pain: FACES Scale, Pretreatment 4-->hurts little more  -CS     Posttreatment Pain Rating 4-->hurts little more  -CS     Pre/Posttreatment Pain Comment c/o mild headache  -     Row Name 11/01/22 1036          Plan of Care Review    Plan of Care Reviewed With patient  -CS     Progress no change  OT eval  -CS     Outcome Evaluation OT eval completed. Pt presents at baseline for ADL completion w/ symmetrical BUE strength and coordination/sensation intact. Pt report R eye vision blurred, WFL for safe ADL completion and related mobility. OT signing off, please re-consult if needed. Rec d/c to home when medically appropriate.  -     Row Name 11/01/22 1036          Therapy Assessment/Plan (OT)    Criteria for Skilled Therapeutic Interventions Met (OT) no problems identified which require skilled intervention  -     Therapy Frequency (OT) evaluation only  -     Row Name 11/01/22 1036          Therapy Plan Review/Discharge Plan (OT)    Anticipated Discharge Disposition (OT) home  -     Row Name 11/01/22 1036          Vital Signs    Pre Systolic BP Rehab --  RN cleared for eval, VSS on RA  -CS     O2 Delivery Pre Treatment room air  -CS     O2 Delivery Intra Treatment room air  -CS     O2 Delivery Post Treatment room air  -CS     Pre Patient Position Supine  -CS     Intra Patient Position Standing  -CS     Post Patient  Position Sitting  -CS     Row Name 11/01/22 1036          Positioning and Restraints    Pre-Treatment Position in bed  -CS     Post Treatment Position bed  -CS     In Bed notified nsg;sitting EOB  no alarm per RN  -CS           User Key  (r) = Recorded By, (t) = Taken By, (c) = Cosigned By    Initials Name Provider Type    CS Boni Ferris, OT Occupational Therapist               Outcome Measures     Row Name 11/01/22 1039          How much help from another is currently needed...    Putting on and taking off regular lower body clothing? 4  -CS     Bathing (including washing, rinsing, and drying) 4  -CS     Toileting (which includes using toilet bed pan or urinal) 4  -CS     Putting on and taking off regular upper body clothing 4  -CS     Taking care of personal grooming (such as brushing teeth) 4  -CS     Eating meals 4  -CS     AM-PAC 6 Clicks Score (OT) 24  -CS     Row Name 11/01/22 1039 11/01/22 1000       How much help from another person do you currently need...    Turning from your back to your side while in flat bed without using bedrails? --  -CS 4  -LL    Moving from lying on back to sitting on the side of a flat bed without bedrails? --  -CS 4  -LL    Moving to and from a bed to a chair (including a wheelchair)? --  -CS 4  -LL    Standing up from a chair using your arms (e.g., wheelchair, bedside chair)? --  -CS 4  -LL    Climbing 3-5 steps with a railing? --  -CS 4  -LL    To walk in hospital room? --  -CS 4  -LL    AM-PAC 6 Clicks Score (PT) --  -CS 24  -LL    Highest level of mobility --  -CS 8 --> Walked 250 feet or more  -LL    Row Name 11/01/22 1039          Modified [place] Scale    Pre-Stroke Modified [place] Scale 0 - No Symptoms at all.  -CS     Modified [place] Scale 1 - No significant disability despite symptoms.  Able to carry out all usual duties and activities.  -     Row Name 11/01/22 1039          Functional Assessment    Outcome Measure Options AM-PAC 6 Clicks Daily Activity  (OT);Modified [place]  -           User Key  (r) = Recorded By, (t) = Taken By, (c) = Cosigned By    Initials Name Provider Type    CS Boni Ferris OT Occupational Therapist    Ruma Capone, RN Registered Nurse              Occupational Therapy Education     Title: PT OT SLP Therapies (In Progress)     Topic: Occupational Therapy (Done)     Point: Precautions (Done)     Description:   Instruct learner(s) on prescribed precautions during self-care and functional transfers.              Learning Progress Summary           Patient Acceptance, E,D, DU,VU by  at 11/1/2022 1040    Comment: in-room safety awareness, non-compliant with socks (RN aware)                               User Key     Initials Effective Dates Name Provider Type Discipline     06/16/21 -  Boni Ferris OT Occupational Therapist OT              OT Recommendation and Plan  Therapy Frequency (OT): evaluation only  Plan of Care Review  Plan of Care Reviewed With: patient  Progress: no change (OT eval)  Outcome Evaluation: OT eval completed. Pt presents at baseline for ADL completion w/ symmetrical BUE strength and coordination/sensation intact. Pt report R eye vision blurred, WFL for safe ADL completion and related mobility. OT signing off, please re-consult if needed. Rec d/c to home when medically appropriate.  Plan of Care Reviewed With: patient  Outcome Evaluation: OT eval completed. Pt presents at baseline for ADL completion w/ symmetrical BUE strength and coordination/sensation intact. Pt report R eye vision blurred, WFL for safe ADL completion and related mobility. OT signing off, please re-consult if needed. Rec d/c to home when medically appropriate.     Time Calculation:    Time Calculation- OT     Row Name 11/01/22 1041             Time Calculation- OT    OT Start Time 1003  -CS      OT Received On 11/01/22  -CS         Untimed Charges    OT Eval/Re-eval Minutes 35  -CS         Total Minutes    Untimed Charges Total Minutes  35  -CS       Total Minutes 35  -CS            User Key  (r) = Recorded By, (t) = Taken By, (c) = Cosigned By    Initials Name Provider Type    CS Boni Ferris OT Occupational Therapist              Therapy Charges for Today     Code Description Service Date Service Provider Modifiers Qty    [id number]  OT EVAL LOW COMPLEXITY 3 11/1/2022 Boni Ferris OT GO 1             OT Discharge Summary  Anticipated Discharge Disposition (OT): home  Reason for Discharge: Independent, At baseline function  Discharge Destination: Home    Boni Ferris OT  11/1/2022

## 2022-11-01 NOTE — OUTREACH NOTE
Stroke Week 2 Survey    Flowsheet Row Responses   [facility] facility patient discharged from? [facility]   Does the patient have one of the following disease processes/diagnoses(primary or secondary)? Stroke   Week 2 attempt successful? No   Unsuccessful attempts Attempt 1   Revoke Readmitted          DIONICIO NEGRO - Registered Nurse

## 2022-11-02 LAB
GLUCOSE BLDC GLUCOMTR-MCNC: 202 MG/DL (ref 70–130)
GLUCOSE BLDC GLUCOMTR-MCNC: 255 MG/DL (ref 70–130)
GLUCOSE BLDC GLUCOMTR-MCNC: 257 MG/DL (ref 70–130)

## 2022-11-02 PROCEDURE — 96376 TX/PRO/DX INJ SAME DRUG ADON: CPT

## 2022-11-02 PROCEDURE — 25010000002 HEPARIN (PORCINE) PER 1000 UNITS: Performed by: INTERNAL MEDICINE

## 2022-11-02 PROCEDURE — 82962 GLUCOSE BLOOD TEST: CPT

## 2022-11-02 PROCEDURE — 63710000001 INSULIN DETEMIR PER 5 UNITS: Performed by: INTERNAL MEDICINE

## 2022-11-02 PROCEDURE — 25010000002 CEFTRIAXONE PER 250 MG: Performed by: INTERNAL MEDICINE

## 2022-11-02 PROCEDURE — 63710000001 INSULIN LISPRO (HUMAN) PER 5 UNITS: Performed by: INTERNAL MEDICINE

## 2022-11-02 PROCEDURE — 96372 THER/PROPH/DIAG INJ SC/IM: CPT

## 2022-11-02 PROCEDURE — 99225 PR SBSQ OBSERVATION CARE/DAY 25 MINUTES: CPT | Performed by: INTERNAL MEDICINE

## 2022-11-02 PROCEDURE — 25010000002 ENOXAPARIN PER 10 MG: Performed by: INTERNAL MEDICINE

## 2022-11-02 PROCEDURE — 96366 THER/PROPH/DIAG IV INF ADDON: CPT

## 2022-11-02 PROCEDURE — 25010000002 KETOROLAC TROMETHAMINE PER 15 MG: Performed by: INTERNAL MEDICINE

## 2022-11-02 PROCEDURE — G0378 HOSPITAL OBSERVATION PER HR: HCPCS

## 2022-11-02 PROCEDURE — 92507 TX SP LANG VOICE COMM INDIV: CPT

## 2022-11-02 RX ORDER — ENOXAPARIN SODIUM 100 MG/ML
40 INJECTION SUBCUTANEOUS EVERY 24 HOURS
Status: DISCONTINUED | OUTPATIENT
Start: 2022-11-02 | End: 2022-11-03 | Stop reason: HOSPADM

## 2022-11-02 RX ADMIN — PREGABALIN 300 MG: 150 CAPSULE ORAL at 21:31

## 2022-11-02 RX ADMIN — AMLODIPINE BESYLATE 5 MG: 5 TABLET ORAL at 08:44

## 2022-11-02 RX ADMIN — KETOROLAC TROMETHAMINE 30 MG: 30 INJECTION, SOLUTION INTRAMUSCULAR; INTRAVENOUS at 10:33

## 2022-11-02 RX ADMIN — Medication 10 ML: at 08:58

## 2022-11-02 RX ADMIN — PREGABALIN 300 MG: 150 CAPSULE ORAL at 09:59

## 2022-11-02 RX ADMIN — DIPHENOXYLATE HYDROCHLORIDE AND ATROPINE SULFATE 1 TABLET: 2.5; .025 TABLET ORAL at 02:15

## 2022-11-02 RX ADMIN — INSULIN DETEMIR 15 UNITS: 100 INJECTION, SOLUTION SUBCUTANEOUS at 21:32

## 2022-11-02 RX ADMIN — ENOXAPARIN SODIUM 40 MG: 40 INJECTION SUBCUTANEOUS at 13:34

## 2022-11-02 RX ADMIN — HEPARIN SODIUM 5000 UNITS: 5000 INJECTION INTRAVENOUS; SUBCUTANEOUS at 06:08

## 2022-11-02 RX ADMIN — PANTOPRAZOLE SODIUM 40 MG: 40 TABLET, DELAYED RELEASE ORAL at 06:08

## 2022-11-02 RX ADMIN — SODIUM CHLORIDE 1 G: 900 INJECTION INTRAVENOUS at 21:32

## 2022-11-02 RX ADMIN — CLOPIDOGREL BISULFATE 75 MG: 75 TABLET ORAL at 08:45

## 2022-11-02 RX ADMIN — INSULIN LISPRO 3 UNITS: 100 INJECTION, SOLUTION INTRAVENOUS; SUBCUTANEOUS at 12:16

## 2022-11-02 RX ADMIN — INSULIN LISPRO 4 UNITS: 100 INJECTION, SOLUTION INTRAVENOUS; SUBCUTANEOUS at 17:27

## 2022-11-02 RX ADMIN — LOSARTAN POTASSIUM 50 MG: 50 TABLET, FILM COATED ORAL at 08:45

## 2022-11-02 RX ADMIN — ACETAMINOPHEN 1000 MG: 500 TABLET ORAL at 08:44

## 2022-11-02 RX ADMIN — ATORVASTATIN CALCIUM 80 MG: 40 TABLET, FILM COATED ORAL at 21:32

## 2022-11-02 RX ADMIN — ASPIRIN 81 MG CHEWABLE TABLET 81 MG: 81 TABLET CHEWABLE at 08:44

## 2022-11-02 RX ADMIN — ACETAMINOPHEN 1000 MG: 500 TABLET ORAL at 21:31

## 2022-11-02 RX ADMIN — INSULIN LISPRO 4 UNITS: 100 INJECTION, SOLUTION INTRAVENOUS; SUBCUTANEOUS at 08:44

## 2022-11-02 RX ADMIN — ACETAMINOPHEN 1000 MG: 500 TABLET ORAL at 16:12

## 2022-11-02 NOTE — CASE MANAGEMENT/SOCIAL WORK
Continued Stay Note   [place]     Patient Name: Rachana Patrick  MRN: [id number]  Today's Date: 11/2/2022    Admit Date: 10/31/2022    Plan: Home   Discharge Plan     Row Name 11/02/22 1407       Plan    Plan Home    Patient/Family in Agreement with Plan yes    Plan Comments CM spoke with patient at bedside regarding DC planning. Discharge plan is home with spouse's assistance, if needed. Patient denies any needs at this time. CM following.    Final Discharge Disposition Code 01 - home or self-care                            Expected Discharge Date and Time     Expected Discharge Date Expected Discharge Time    Nov 3, 2022             Trina Perez RN

## 2022-11-02 NOTE — PROGRESS NOTES
[facility] Medicine Services  PROGRESS NOTE    Patient Name: Rachana Patrick  : 1975  MRN: [id number]    Date of Admission: 10/31/2022  Primary Care Physician: Provider, No Known    Subjective   Subjective     CC:  HA    HPI:  Resting in bed in no acute distress and tells me that she feels better.  No fever or chills.  No chest pain or palpitation. No nausea vomiting or diarrhea.    ROS:  As of    Objective   Objective     Vital Signs:   Temp:  [98.1 °F (36.7 °C)-98.5 °F (36.9 °C)] 98.2 °F (36.8 °C)  Heart Rate:  [] 91  Resp:  [18] 18  BP: (141-166)/(86-97) 161/89     Physical Exam:  Constitutional: No acute distress  HENT: NCAT, mucous membranes moist, left eye blurry vision  Respiratory: Clear to auscultation bilaterally, respiratory effort normal   Cardiovascular: RRR, no murmurs, rubs, or gallops  Gastrointestinal: Abdomen is obese but positive bowel sounds, soft, nontender, nondistended  Musculoskeletal: No bilateral ankle edema  Psychiatric: Appropriate affect, cooperative  Neurologic: Oriented x 3, strength symmetric in all extremities, Cranial Nerves grossly intact to confrontation, speech clear  Skin: No rashes    Results Reviewed:  LAB RESULTS:      Lab 10/31/22  1214 10/31/22  0851 10/31/22  0821   WBC  --   --  18.14*   HEMOGLOBIN  --   --  15.6   HEMATOCRIT  --   --  45.5   PLATELETS  --   --  208   NEUTROS ABS  --   --  15.72*   IMMATURE GRANS (ABS)  --   --  0.09*   LYMPHS ABS  --   --  1.35   MONOS ABS  --   --  0.88   EOS ABS  --   --  0.05   MCV  --   --  87.5   SED RATE  --   --  40*   CRP  --  12.85*  --    LACTATE 1.0  --   --    PROTIME  --   --  14.7*   APTT  --   --  32.7*         Lab 22  0716 10/31/22  0900 10/31/22  0857 10/31/22  0851   SODIUM 136  --   --  127*   POTASSIUM 4.0  --   --  3.1*   CHLORIDE 105  --   --  97*   CO2 20.0*  --   --  21.0*   ANION GAP 11.0  --   --  9.0   BUN 9  --   --  9   CREATININE 0.57 0.60 0.60 0.61   EGFR  111.6  --   --  109.8   GLUCOSE 242*  --   --  272*   CALCIUM 8.8  --   --  8.7         Lab 10/31/22  0851   TOTAL PROTEIN 6.7   ALBUMIN 3.50   GLOBULIN 3.2   ALT (SGPT) 29   AST (SGOT) 33*   BILIRUBIN 0.3   ALK PHOS 92         Lab 10/31/22  0821   PROTIME 14.7*   INR 1.16*         Lab 11/01/22  0716   CHOLESTEROL 104   LDL CHOL 43   HDL CHOL 21*   TRIGLYCERIDES 258*             Brief Urine Lab Results  (Last result in the past 365 days)      Color   Clarity   Blood   Leuk Est   Nitrite   Protein   CREAT   Urine HCG        10/31/22 0815 Yellow   Cloudy   Negative   Small (1+)   Positive   30 mg/dL (1+)           10/31/22 0815               Negative             Microbiology Results Abnormal     Procedure Component Value - Date/Time    Blood Culture - Blood, Arm, Right [[id number]]  (Normal) Collected: 10/31/22 1205    Lab Status: Preliminary result Specimen: Blood from Arm, Right Updated: 11/01/22 1532     Blood Culture No growth at 24 hours    Blood Culture - Blood, Hand, Right [[id number]]  (Normal) Collected: 10/31/22 1213    Lab Status: Preliminary result Specimen: Blood from Hand, Right Updated: 11/01/22 1330     Blood Culture No growth at 24 hours    Gastrointestinal Panel, PCR - Stool, Per Rectum [[id number]]  (Normal) Collected: 11/01/22 0332    Lab Status: Final result Specimen: Stool from Per Rectum Updated: 11/01/22 0909     Campylobacter Not Detected     Plesiomonas shigelloides Not Detected     Salmonella Not Detected     Vibrio Not Detected     Vibrio cholerae Not Detected     Yersinia enterocolitica Not Detected     Enteroaggregative E. coli (EAEC) Not Detected     Enteropathogenic E. coli (EPEC) Not Detected     Enterotoxigenic E. coli (ETEC) lt/st Not Detected     Shiga-like toxin-producing E. coli (STEC) stx1/stx2 Not Detected     Shigella/Enteroinvasive E. coli (EIEC) Not Detected     Cryptosporidium Not Detected     Cyclospora cayetanensis Not Detected     Entamoeba histolytica Not Detected      Giardia lamblia Not Detected     Adenovirus F40/41 Not Detected     Astrovirus Not Detected     Norovirus GI/GII Not Detected     Rotavirus A Not Detected     Sapovirus (I, II, IV or V) Not Detected    Clostridioides difficile Toxin - Stool, Per Rectum [[id number]]  (Normal) Collected: 11/01/22 0332    Lab Status: Final result Specimen: Stool from Per Rectum Updated: 11/01/22 0844    Narrative:      The following orders were created for panel order Clostridioides difficile Toxin - Stool, Per Rectum.  Procedure                               Abnormality         Status                     ---------                               -----------         ------                     Clostridioides difficile...[[id number]]  Normal              Final result                 Please view results for these tests on the individual orders.    Clostridioides difficile Toxin, PCR - Stool, Per Rectum [[id number]]  (Normal) Collected: 11/01/22 0332    Lab Status: Final result Specimen: Stool from Per Rectum Updated: 11/01/22 0844     C. Difficile Toxins by PCR Not Detected    Narrative:      The result indicates the absence of toxigenic C. difficile from stool specimen.     COVID PRE-OP / PRE-PROCEDURE SCREENING ORDER (NO ISOLATION) - Swab, Nasopharynx [[id number]]  (Normal) Collected: 10/31/22 0821    Lab Status: Final result Specimen: Swab from Nasopharynx Updated: 10/31/22 0909    Narrative:      The following orders were created for panel order COVID PRE-OP / PRE-PROCEDURE SCREENING ORDER (NO ISOLATION) - Swab, Nasopharynx.  Procedure                               Abnormality         Status                     ---------                               -----------         ------                     COVID-19 and FLU A/B PCR...[[id number]]  Normal              Final result                 Please view results for these tests on the individual orders.    COVID-19 and FLU A/B PCR - Swab, Nasopharynx [[id number]]  (Normal) Collected: 10/31/22 0821     Lab Status: Final result Specimen: Swab from Nasopharynx Updated: 10/31/22 0909     COVID19 Not Detected     Influenza A PCR Not Detected     Influenza B PCR Not Detected    Narrative:      Fact sheet for providers: https://www.fda.gov/media/423585/download    Fact sheet for patients: https://www.fda.gov/media/763516/download    Test performed by PCR.          Adult Transthoracic Echo Complete W/ Cont if Necessary Per Protocol (With Agitated Saline)    Result Date: 11/1/2022  •  Left ventricular systolic function is normal. Left ventricular ejection fraction appears to be 66 - 70%. •  Saline test results are negative. •  There is calcification of the aortic valve. •  Estimated right ventricular systolic pressure from tricuspid regurgitation is normal (<35 mmHg). Calculated right ventricular systolic pressure from tricuspid regurgitation is 26 mmHg.     MRI Brain Without Contrast    Result Date: 11/1/2022  DATE OF EXAM: 11/1/2022 8:30 AM  PROCEDURE: MRI BRAIN WO CONTRAST-  INDICATIONS: Stroke, follow up; R41.82-Altered mental status, unspecified; N39.0-Urinary tract infection, site not specified; E87.6-Hypokalemia; R51.9-Headache, unspecified; I63.81-Other cerebral infarction due to occlusion or stenosis of small artery  COMPARISON: Head CT 10/31/2022  TECHNIQUE: Multiplanar multisequence images of the brain were performed without contrast according to routine brain MRI protocol.  FINDINGS: There are small scattered acute lacunar infarcts of the left thalamus, left caudate body, and splenium of the left corpus callosum (series 3 image 57-60). No intracranial hemorrhage. No intracranial mass. Other than the above-described regions of acute lacunar infarcts, gray-white differentiation appears grossly preserved. No extra-axial collections. No midline shift or herniation. Normal size and configuration of the ventricles. The cerebellopontine angles and midline structures are normal. The major intracranial vascular flow  voids are preserved. Normal appearance of the orbits. There is mild mucosal thickening in the right maxillary sinus. The mastoid air cells are clear. No acute or suspicious bony findings.      Impression: Small scattered acute lacunar infarcts of the left thalamus, left caudate body, and left splenium of the corpus callosum.  Findings discussed with stroke navigator by Dr. Harrison Turk via telephone at 8:57 AM 11/1/2022.  This report was finalized on 11/1/2022 8:57 AM by Harrison Turk MD.        Results for orders placed during the hospital encounter of 10/31/22    Adult Transthoracic Echo Complete W/ Cont if Necessary Per Protocol (With Agitated Saline)    Interpretation Summary  •  Left ventricular systolic function is normal. Left ventricular ejection fraction appears to be 66 - 70%.  •  Saline test results are negative.  •  There is calcification of the aortic valve.  •  Estimated right ventricular systolic pressure from tricuspid regurgitation is normal (<35 mmHg). Calculated right ventricular systolic pressure from tricuspid regurgitation is 26 mmHg.      I have reviewed the medications:  Scheduled Meds:acetaminophen, 1,000 mg, Oral, TID  amLODIPine, 5 mg, Oral, Daily  aspirin, 81 mg, Oral, Daily   Or  aspirin, 300 mg, Rectal, Daily  atorvastatin, 80 mg, Oral, Nightly  cefTRIAXone, 1 g, Intravenous, Q24H  clopidogrel, 75 mg, Oral, Daily  enoxaparin, 40 mg, Subcutaneous, Q24H  insulin detemir, 15 Units, Subcutaneous, Nightly  insulin lispro, 0-7 Units, Subcutaneous, TID AC  losartan, 50 mg, Oral, Daily  pantoprazole, 40 mg, Oral, QAM  pregabalin, 300 mg, Oral, Q12H  senna-docusate sodium, 2 tablet, Oral, BID  sodium chloride, 10 mL, Intravenous, Q12H  sodium chloride, 10 mL, Intravenous, Q12H      Continuous Infusions:   PRN Meds:.•  Albuterol Sulfate NEB Orderable  •  senna-docusate sodium **AND** polyethylene glycol **AND** bisacodyl **AND** bisacodyl  •  dextrose  •  dextrose  •   diphenoxylate-atropine  •  glucagon (human recombinant)  •  ketorolac  •  metoclopramide  •  potassium chloride **OR** potassium chloride **OR** potassium chloride  •  sodium chloride  •  sodium chloride    Assessment & Plan   Assessment & Plan     Active Hospital Problems    Diagnosis  POA   • **Altered mental status, unspecified altered mental status type [R41.82]  Yes   • Headache [R51.9]  Yes   • Recent cerebrovascular accident (CVA) [Z86.73]  Yes   • Blurry vision, right eye [H53.8]  Yes   • Type 2 diabetes mellitus (HCC) [E11.9]  Yes   • Essential hypertension [I10]  Yes   • Mixed hyperlipidemia [E78.2]  Yes   • Diabetic retinopathy associated with type 2 diabetes mellitus (HCC) [E11.319]  Yes      Resolved Hospital Problems   No resolved problems to display.        Brief Hospital Course to date:  Rachana Patrick is a 47 y.o. female with past medical history significant for diabetes mellitus, hypertension, hyperlipidemia, recent CVA with residual right blurry vision.  Patient was admitted for headache.      *Headache and MRI evidence of either new stroke or extension of previous one.  Currently patient feels better.  Stroke neurology is following    *Right eye blurry vision    *Bilateral carotid stenosis shown on CTA.  Patient is currently on aspirin and Plavix.    *Urinary tract infection.  Urine is positive for leukocyte Estrace and nitrite.   Urine culture growing gram negative bacilli.  Currently patient is on Rocephin.        Expected Discharge Location and Transportation: Home  Expected Discharge Date: In couple of days    DVT prophylaxis:  Medical and mechanical DVT prophylaxis orders are present.     AM-PAC 6 Clicks Score (PT): 24 (11/02/22 9801)    CODE STATUS:   Code Status and Medical Interventions:   Ordered at: 10/31/22 1222     Code Status (Patient has no pulse and is not breathing):    CPR (Attempt to Resuscitate)     Medical Interventions (Patient has pulse or is breathing):    Full Support        Lino Damian MD  11/02/22

## 2022-11-02 NOTE — THERAPY TREATMENT NOTE
Acute Care - Speech Language Pathology Treatment Note  [facility]     Patient Name: Rachana Patrick  : 1975  MRN: [id number]  Today's Date: 2022               Admit Date: 10/31/2022     Visit Dx:    ICD-10-CM ICD-9-CM   1. Altered mental status, unspecified altered mental status type  R41.82 780.97   2. Urinary tract infection without hematuria, site unspecified  N39.0 599.0   3. Hypokalemia  E87.6 276.8   4. Occipital headache  R51.9 784.0   5. Left thalamic infarction (HCC)  I63.81 434.91   6. Cognitive attention deficit  R41.840 799.51     Patient Active Problem List   Diagnosis   • Type 2 diabetes mellitus with other specified complication (HCC)   • Mixed hyperlipidemia   • Diabetic retinopathy associated with type 2 diabetes mellitus (HCC)   • Essential hypertension   • Tobacco abuse   • Class 1 obesity due to excess calories with serious comorbidity and body mass index (BMI) of 34.0 to 34.9 in adult   • Cerebrovascular accident (CVA), unspecified mechanism (HCC)   • Type 2 diabetes mellitus (HCC)   • Thalamic infarct, acute (HCC)   • Altered mental status, unspecified altered mental status type   • Headache   • Recent cerebrovascular accident (CVA)   • Blurry vision, right eye     Past Medical History:   Diagnosis Date   • Hyperlipidemia    • Hypertension    • Type 2 diabetes mellitus (HCC)      History reviewed. No pertinent surgical history.    SLP Recommendation and Plan                 Anticipated Discharge Disposition (SLP): unknown, anticipate therapy at next level of care (22)        Predicted Duration Therapy Intervention (Days): until discharge (22)     Daily Summary of Progress (SLP): progress toward functional goals as expected (22 112)           Treatment Assessment (SLP): continued, cognitive-linguistic disorder (22 112)  Plan for Continued Treatment (SLP): continue treatment per plan of care (22)         SLP EVALUATION (last 72 hours)      SLP SLC Evaluation     Row Name 11/02/22 1120 10/31/22 5567                Communication Assessment/Intervention    Document Type therapy note (daily note)  - evaluation  -       Subjective Information no complaints  - no complaints  -       Patient Observations cooperative;agree to therapy  - alert;poorly cooperative  -       Patient/Family/Caregiver Comments/Observations No family present  - Daughter present  -       Patient Effort good  - adequate  -       Symptoms Noted During/After Treatment none  - none  -          General Information    Patient Profile Reviewed -- yes  -       Pertinent History Of Current Problem -- SLC eval completed per stroke protocol, pt passed RN dysphagia screen. Adm w/ severe headache, nausea, vomiting, and blurred vision in R eye. Hx: decent adm @ [facility] w/ left thalamic CVA, carotoid disease, HTN, HLD. MRI: subacute left thalamic infarct  -       Precautions/Limitations, Vision -- other (see comments)  pt reports blurred vision in R eye  -       Precautions/Limitations, Hearing -- WFL;for purposes of eval  -       Prior Level of Function-Communication -- unknown  -       Plans/Goals Discussed with -- patient;family  -       Barriers to Rehab -- cognitive status  -       Patient's Goals for Discharge -- patient did not state  -       Family Goals for Discharge -- family did not state  -          Pain    Additional Documentation Pain Scale: FACES Pre/Post-Treatment (Group)  - Pain Scale: FACES Pre/Post-Treatment (Group)  -          Pain Scale: FACES Pre/Post-Treatment    Pain: FACES Scale, Pretreatment 0-->no hurt  -MH 0-->no hurt  -       Posttreatment Pain Rating 0-->no hurt  -MH 0-->no hurt  -          Comprehension Assessment/Intervention    Comprehension Assessment/Intervention -- Auditory Comprehension  -          Auditory Comprehension Assessment/Intervention    Auditory Comprehension (Communication) -- WFL  -           Expression Assessment/Intervention    Expression Assessment/Intervention -- verbal expression  -          Verbal Expression Assessment/Intervention    Verbal Expression -- mild impairment  -       Automatic Speech (Communication) -- [facility]  -       Repetition -- [facility]  -       Phrase Completion -- automatic/predictable;[facility]  -       Responsive Naming -- simple;mild impairment  -       Confrontational Naming -- high frequency;mild impairment  -       Spontaneous/Functional Words -- simple;mild impairment  -       Sentence Formulation -- simple;mild impairment  -          Oral Musculature and Cranial Nerve Assessment    Oral Motor General Assessment -- [facility]  -          Motor Speech Assessment/Intervention    Motor Speech Function -- [facility]          Cognitive Assessment Intervention- SLP    Cognitive Function (Cognition) -- mild impairment  -       Orientation Status (Cognition) -- awareness of basic personal information;person;place;time;situation;[facility]  -       Memory (Cognitive) -- simple;immediate;mild impairment  -       Attention (Cognitive) -- selective;sustained;mild impairment  -       Thought Organization (Cognitive) -- concrete divergent;concrete convergent;mild impairment  -       Reasoning (Cognitive) -- simple;mild impairment  -       Problem Solving (Cognitive) -- simple;temporal;mild impairment  -       Functional Math (Cognitive) -- simple;word problems;mild impairment  -          SLP Evaluation Clinical Impressions    SLP Diagnosis -- mild;cognitive-linguistic disorder  -       SLP Diagnosis Comments -- Pt presents w/ mild cognitive linguistic deficits. Motor speech is grossly functional. Difficult to fully asses higher level language 2/2 attention and memory impairments. Pt reports increased word finding difficulty ~ a week.  SLP will f/u for [facility]       Rehab Potential/Prognosis -- good  -[facility] Criteria for Skilled Therapy Interventions Met -- yes  -        Functional Impact -- difficulty communicating in an emergency;difficulty in expressing complex messages  -          SLP Treatment Clinical Impressions    Treatment Assessment (SLP) continued;cognitive-linguistic disorder  - --       Treatment Assessment Comments (SLP) Pt reports increased frusteration 2/2 word finding difficulties in conversation and vision deficts. Reading/writing grossly functional at sentence level. Attention thought to be impacting accuracy throughout session. Provided education regarding general attention/memory strategies. SLP will cont to f/u for tx  -MH --       Daily Summary of Progress (SLP) progress toward functional goals as expected  - --       Barriers to Overall Progress (SLP) Cognitive status  - --       Plan for Continued Treatment (SLP) continue treatment per plan of care  - --       Care Plan Review evaluation/treatment results reviewed  - --          Recommendations    Therapy Frequency (SLP SLC) 3 days per week  - 3 days per week  -       Predicted Duration Therapy Intervention (Days) until discharge  - until discharge  -       Anticipated Discharge Disposition (SLP) unknown;anticipate therapy at next level of care  - unknown;anticipate therapy at next level of care  -       Further Assessment Needed in the Following Areas -- verbal expression;graphic expression;higher-level cognitive-linguistic  -             User Key  (r) = Recorded By, (t) = Taken By, (c) = Cosigned By    Initials Name Effective Dates     Ira Arias, MS, CFYOLY-SLP 06/22/22 -                    EDUCATION  The patient has been educated in the following areas:     Cognitive Impairment Communication Impairment.           SLP GOALS     Row Name 11/02/22 1120 10/31/22 1450          Word Retrieval Skills Goal 1 (SLP)    Improve Word Retrieval Skills By Goal 1 (SLP) completing open ended structured sentence;completing open ended unstructured sentence;completing functional word finding  tasks;80%  - answer WH question with one word;completing a divergent task;completing a convergent task;80%;with minimal cues (75-90%)  -     Time Frame (Word Retrieval Goal 1, SLP) short term goal (STG)  - short term goal (STG)  -     Progress/Outcomes (Word Retrieval Goal 1, SLP) goal revised this date  - new goal  -        Attention Goal 1 (SLP)    Improve Attention by Goal 1 (SLP) complete selective attention task;complete sustained attention task;80%;with minimal cues (75-90%)  - complete selective attention task;complete sustained attention task;80%;with minimal cues (75-90%)  -     Time Frame (Attention Goal 1, SLP) short term goal (STG)  - short term goal (STG)  -     Progress (Attention Goal 1, SLP) 50%;with minimal cues (75-90%)  - --     Progress/Outcomes (Attention Goal 1, SLP) continuing progress toward goal  - new goal  -        Memory Skills Goal 1 (SLP)    Improve Memory Skills Through Goal 1 (SLP) recalling related word lists immediately;recalling unrelated word lists immediately;80%;with minimal cues (75-90%)  - recalling related word lists immediately;recalling unrelated word lists immediately;80%;with minimal cues (75-90%)  -     Time Frame (Memory Skills Goal 1, SLP) short term goal (STG)  - short term goal (STG)  -     Progress (Memory Skills Goal 1, SLP) 60%;with minimal cues (75-90%)  - --     Progress/Outcomes (Memory Skills Goal 1, SLP) continuing progress toward goal  - new goal  -     Comment (Memory Skills Goal 1, SLP) Recalling unrelated immediately  - --        Organizational Skills Goal 1 (SLP)    Improve Thought Organization Through Goal 1 (SLP) completing a divergent naming task;completing a convergent naming task;80%;with minimal cues (75-90%)  - completing a divergent naming task;completing a convergent naming task;80%;with minimal cues (75-90%)  -     Time Frame (Thought Organization Skills Goal 1, SLP) short term goal (STG)  - short  term goal (STG)  -MH     Progress (Thought Organization Skills Goal 1, SLP) 60%;with minimal cues (75-90%)  -MH --     Progress/Outcomes (Thought Organization Skills Goal 1, SLP) continuing progress toward goal  -MH new goal  -MH     Comment (Thought Organization Skills Goal 1, SLP) Divergent naming  -MH --        Patient will demonstrate functional cognitive-linguistic skills for return to discharge environment    [place] Independently  -MH Independently  -MH     Time frame by discharge  -MH by discharge  -MH     Progress/Outcomes continuing progress toward goal  -MH new goal  -MH           User Key  (r) = Recorded By, (t) = Taken By, (c) = Cosigned By    Initials Name Provider Type     Ira Arias MS, CFY-SLP Speech and Language Pathologist                        Time Calculation:      Time Calculation- SLP     Row Name 11/02/22 1255             Time Calculation- SLP    SLP Start Time 1120  -MH      SLP Received On 11/02/22  -MH         Untimed Charges    83288-JR Treatment/ST Modification Prosth Aug Alter  35  -MH         Total Minutes    Untimed Charges Total Minutes 35  -MH       Total Minutes 35  -MH            User Key  (r) = Recorded By, (t) = Taken By, (c) = Cosigned By    Initials Name Provider Type     Ira Arias, MS, CFY-SLP Speech and Language Pathologist                Therapy Charges for Today     Code Description Service Date Service Provider Modifiers Qty    [id number]  ST TREATMENT SPEECH 2 11/2/2022 Ira Arias MS, CFY-SLP GN 1                Patient was not wearing a face mask and did not exhibit coughing during this therapy encounter.  Procedure performed was not aerosolizing, did not involve close contact (within 6 feet for at least 15 minutes or longer), and did not involve contact with infectious secretions or specimens.  Therapist used appropriate personal protective equipment including gloves, standard procedure mask and eye protection.  Appropriate PPE was  worn during the entire therapy session.  Hand hygiene was completed before and after therapy session.         Ira Arias MS, CFY-SLP  11/2/2022

## 2022-11-02 NOTE — PLAN OF CARE
Goal Outcome Evaluation:   SLP treatment completed. Will continue to address comunication. Please see note for further details and recommendations.

## 2022-11-03 ENCOUNTER — READMISSION MANAGEMENT (OUTPATIENT)
Dept: CALL CENTER | Facility: HOSPITAL | Age: 47
End: 2022-11-03

## 2022-11-03 VITALS
WEIGHT: 186 LBS | SYSTOLIC BLOOD PRESSURE: 177 MMHG | HEIGHT: 60 IN | TEMPERATURE: 98.1 F | DIASTOLIC BLOOD PRESSURE: 94 MMHG | BODY MASS INDEX: 36.52 KG/M2 | OXYGEN SATURATION: 97 % | HEART RATE: 75 BPM | RESPIRATION RATE: 18 BRPM

## 2022-11-03 LAB
BACTERIA SPEC AEROBE CULT: ABNORMAL
GLUCOSE BLDC GLUCOMTR-MCNC: 264 MG/DL (ref 70–130)
GLUCOSE BLDC GLUCOMTR-MCNC: 265 MG/DL (ref 70–130)

## 2022-11-03 PROCEDURE — 99217 PR OBSERVATION CARE DISCHARGE MANAGEMENT: CPT | Performed by: INTERNAL MEDICINE

## 2022-11-03 PROCEDURE — 63710000001 INSULIN LISPRO (HUMAN) PER 5 UNITS: Performed by: INTERNAL MEDICINE

## 2022-11-03 PROCEDURE — 82962 GLUCOSE BLOOD TEST: CPT

## 2022-11-03 PROCEDURE — G0378 HOSPITAL OBSERVATION PER HR: HCPCS

## 2022-11-03 RX ORDER — CHOLECALCIFEROL (VITAMIN D3) 125 MCG
5 CAPSULE ORAL NIGHTLY PRN
Status: DISCONTINUED | OUTPATIENT
Start: 2022-11-03 | End: 2022-11-03 | Stop reason: HOSPADM

## 2022-11-03 RX ORDER — INSULIN LISPRO 100 [IU]/ML
4 INJECTION, SOLUTION INTRAVENOUS; SUBCUTANEOUS
Status: DISCONTINUED | OUTPATIENT
Start: 2022-11-03 | End: 2022-11-03 | Stop reason: HOSPADM

## 2022-11-03 RX ORDER — CEFUROXIME AXETIL 250 MG/1
250 TABLET ORAL EVERY 12 HOURS SCHEDULED
Qty: 9 TABLET | Refills: 0 | Status: SHIPPED | OUTPATIENT
Start: 2022-11-03 | End: 2022-11-08

## 2022-11-03 RX ORDER — AMLODIPINE BESYLATE 10 MG/1
10 TABLET ORAL DAILY
Qty: 30 TABLET | Refills: 0 | Status: SHIPPED | OUTPATIENT
Start: 2022-11-03 | End: 2023-02-13 | Stop reason: SDUPTHER

## 2022-11-03 RX ORDER — CEFUROXIME AXETIL 250 MG/1
250 TABLET ORAL EVERY 12 HOURS SCHEDULED
Status: DISCONTINUED | OUTPATIENT
Start: 2022-11-03 | End: 2022-11-03 | Stop reason: HOSPADM

## 2022-11-03 RX ADMIN — INSULIN LISPRO 4 UNITS: 100 INJECTION, SOLUTION INTRAVENOUS; SUBCUTANEOUS at 11:46

## 2022-11-03 RX ADMIN — INSULIN LISPRO 4 UNITS: 100 INJECTION, SOLUTION INTRAVENOUS; SUBCUTANEOUS at 09:29

## 2022-11-03 RX ADMIN — PANTOPRAZOLE SODIUM 40 MG: 40 TABLET, DELAYED RELEASE ORAL at 06:11

## 2022-11-03 RX ADMIN — CEFUROXIME AXETIL 250 MG: 250 TABLET, FILM COATED ORAL at 11:47

## 2022-11-03 RX ADMIN — DIPHENOXYLATE HYDROCHLORIDE AND ATROPINE SULFATE 1 TABLET: 2.5; .025 TABLET ORAL at 02:56

## 2022-11-03 RX ADMIN — AMLODIPINE BESYLATE 5 MG: 5 TABLET ORAL at 09:27

## 2022-11-03 RX ADMIN — CLOPIDOGREL BISULFATE 75 MG: 75 TABLET ORAL at 09:27

## 2022-11-03 RX ADMIN — LOSARTAN POTASSIUM 50 MG: 50 TABLET, FILM COATED ORAL at 09:27

## 2022-11-03 RX ADMIN — Medication 5 MG: at 02:15

## 2022-11-03 RX ADMIN — INSULIN LISPRO 4 UNITS: 100 INJECTION, SOLUTION INTRAVENOUS; SUBCUTANEOUS at 09:45

## 2022-11-03 RX ADMIN — INSULIN LISPRO 4 UNITS: 100 INJECTION, SOLUTION INTRAVENOUS; SUBCUTANEOUS at 11:50

## 2022-11-03 RX ADMIN — PREGABALIN 300 MG: 150 CAPSULE ORAL at 09:26

## 2022-11-03 RX ADMIN — ASPIRIN 81 MG CHEWABLE TABLET 81 MG: 81 TABLET CHEWABLE at 09:26

## 2022-11-03 RX ADMIN — DIPHENOXYLATE HYDROCHLORIDE AND ATROPINE SULFATE 1 TABLET: 2.5; .025 TABLET ORAL at 09:45

## 2022-11-03 RX ADMIN — ACETAMINOPHEN 1000 MG: 500 TABLET ORAL at 09:26

## 2022-11-03 NOTE — CASE MANAGEMENT/SOCIAL WORK
Continued Stay Note  [facility]     Patient Name: Rachana Patrick  MRN: [id number]  Today's Date: 11/3/2022    Admit Date: 10/31/2022    Plan: New PCP arranged   Discharge Plan     Row Name 11/03/22 1159       Plan    Plan New PCP arranged    Plan Comments New PCP arranged through the [facility].  Appointment with Dr. Jerel Agudelo has been arranged and is noted in the AVS.    Final Discharge Disposition Code 01 - home or self-care                              Expected Discharge Date and Time     Expected Discharge Date Expected Discharge Time    Nov 4, 2022             Larissa Bosch RN

## 2022-11-03 NOTE — DISCHARGE SUMMARY
[facility] Medicine Services  DISCHARGE SUMMARY    Patient Name: Rachana Patrick  : 1975  MRN: [id number]    Date of Admission: 10/31/2022  7:03 AM  Date of Discharge:  11/3/22  Primary Care Physician: Jerel Agudelo MD    Consults     Date and Time Order Name Status Description    10/31/2022  2:58 PM Inpatient Neurology Consult Stroke      10/22/2022  6:46 PM Inpatient Neurology Consult Stroke Completed           Hospital Course     Presenting Problem:   Altered mental status, unspecified altered mental status type [R41.82]  Headache [R51.9]    Active Hospital Problems    Diagnosis  POA   • **Altered mental status, unspecified altered mental status type [R41.82]  Yes   • Headache [R51.9]  Yes   • Recent cerebrovascular accident (CVA) [Z86.73]  Yes   • Blurry vision, right eye [H53.8]  Yes   • Type 2 diabetes mellitus (HCC) [E11.9]  Yes   • Essential hypertension [I10]  Yes   • Mixed hyperlipidemia [E78.2]  Yes   • Diabetic retinopathy associated with type 2 diabetes mellitus (HCC) [E11.319]  Yes      Resolved Hospital Problems   No resolved problems to display.          Hospital Course:  Rachana Patrick is a 47 y.o. female  with past medical history significant for diabetes mellitus, hypertension, hyperlipidemia, recent CVA with residual right blurry vision.  Patient was admitted for headache.        *Headache and MRI evidence of  extension of previous CVA.  Currently patient feels better.  Stroke neurology followed the patient while she was in the hospital. Patient needs to follow up with them in stroke clinic as per schedule.  - will continue dual antiplatelet therapy and high-dose Lipitor.  -Needs to control blood pressure and blood glucose with systolic blood pressure goal of less than 140 and hemoglobin A1c goal of less than 7.  Currently hemoglobin A1c is more than 9.     *Right eye blurry vision, improved but not resolved.  Neurology recommended follow-up with  ophthalmology at [facility].     *Bilateral carotid stenosis shown on CTA.  Patient is currently on aspirin and Plavix.     *Urinary tract infection.  Urine is positive for leukocyte Estrace and nitrite.  Patient has been treated for urinary tract infection recently.  Urine culture grew Klebsiella pneumonia which is sensitive to ceftriaxone.  Patient has received 3 doses of ceftriaxone while in the hospital.  We will switch to cefuroxime p.o.    *Diabetes mellitus type 2, poorly controlled.  Hemoglobin A1c is higher than 9.  PCP needs to adjust the medication to bring the hemoglobin A1c below 7.  Prior to this admission patient did not have PCP.  Patient was given appointment with [facility] primary care provider.    Discharge Follow Up Recommendations for outpatient labs/diagnostics:  PCP should refer the patient to ophthalmology.  She continues to have some blurriness in the right eye.    Day of Discharge     HPI:   Resting in bed in no acute distress and overall comfortable.  Still has some blurry vision in the right eye.  Overall she has improved significantly.  No fever or chills.  No chest pain or palpitation or shortness of breath.  No nausea vomiting or  abdominal pain    Review of Systems  As above    Vital Signs:   Temp:  [98 °F (36.7 °C)-98.5 °F (36.9 °C)] 98.1 °F (36.7 °C)  Heart Rate:  [75-89] 75  Resp:  [18] 18  BP: (170-177)/(86-94) 177/94      Physical Exam:  Constitutional: No acute distress, looks comfortable  HENT: NCAT, mucous membranes moist, right eye blurry vision  Respiratory: Clear to auscultation bilaterally, respiratory effort normal   Cardiovascular: RRR, no murmurs, rubs, or gallops  Gastrointestinal: Abdomen is obese.  Positive bowel sounds, soft, nontender, nondistended  Musculoskeletal: No bilateral ankle edema  Psychiatric: Appropriate affect, cooperative  Neurologic: Oriented x 3, strength symmetric in all extremities, Cranial Nerves grossly intact to confrontation,  speech clear  Skin: No rashes    Pertinent  and/or Most Recent Results     LAB RESULTS:      Lab 10/31/22  1214 10/31/22  0851 10/31/22  0821   WBC  --   --  18.14*   HEMOGLOBIN  --   --  15.6   HEMATOCRIT  --   --  45.5   PLATELETS  --   --  208   NEUTROS ABS  --   --  15.72*   IMMATURE GRANS (ABS)  --   --  0.09*   LYMPHS ABS  --   --  1.35   MONOS ABS  --   --  0.88   EOS ABS  --   --  0.05   MCV  --   --  87.5   SED RATE  --   --  40*   CRP  --  12.85*  --    LACTATE 1.0  --   --    PROTIME  --   --  14.7*   APTT  --   --  32.7*         Lab 11/01/22  0716 10/31/22  0900 10/31/22  0857 10/31/22  0851   SODIUM 136  --   --  127*   POTASSIUM 4.0  --   --  3.1*   CHLORIDE 105  --   --  97*   CO2 20.0*  --   --  21.0*   ANION GAP 11.0  --   --  9.0   BUN 9  --   --  9   CREATININE 0.57 0.60 0.60 0.61   EGFR 111.6  --   --  109.8   GLUCOSE 242*  --   --  272*   CALCIUM 8.8  --   --  8.7         Lab 10/31/22  0851   TOTAL PROTEIN 6.7   ALBUMIN 3.50   GLOBULIN 3.2   ALT (SGPT) 29   AST (SGOT) 33*   BILIRUBIN 0.3   ALK PHOS 92         Lab 10/31/22  0821   PROTIME 14.7*   INR 1.16*         Lab 11/01/22  0716   CHOLESTEROL 104   LDL CHOL 43   HDL CHOL 21*   TRIGLYCERIDES 258*             Brief Urine Lab Results  (Last result in the past 365 days)      Color   Clarity   Blood   Leuk Est   Nitrite   Protein   CREAT   Urine HCG        10/31/22 0815 Yellow   Cloudy   Negative   Small (1+)   Positive   30 mg/dL (1+)           10/31/22 0815               Negative           Microbiology Results (last 10 days)     Procedure Component Value - Date/Time    Gastrointestinal Panel, PCR - Stool, Per Rectum [[id number]]  (Normal) Collected: 11/01/22 0330    Lab Status: Final result Specimen: Stool from Per Rectum Updated: 11/01/22 0909     Campylobacter Not Detected     Plesiomonas shigelloides Not Detected     Salmonella Not Detected     Vibrio Not Detected     Vibrio cholerae Not Detected     Yersinia enterocolitica Not Detected      Enteroaggregative E. coli (EAEC) Not Detected     Enteropathogenic E. coli (EPEC) Not Detected     Enterotoxigenic E. coli (ETEC) lt/st Not Detected     Shiga-like toxin-producing E. coli (STEC) stx1/stx2 Not Detected     Shigella/Enteroinvasive E. coli (EIEC) Not Detected     Cryptosporidium Not Detected     Cyclospora cayetanensis Not Detected     Entamoeba histolytica Not Detected     Giardia lamblia Not Detected     Adenovirus F40/41 Not Detected     Astrovirus Not Detected     Norovirus GI/GII Not Detected     Rotavirus A Not Detected     Sapovirus (I, II, IV or V) Not Detected    Clostridioides difficile Toxin - Stool, Per Rectum [[id number]]  (Normal) Collected: 11/01/22 0332    Lab Status: Final result Specimen: Stool from Per Rectum Updated: 11/01/22 0844    Narrative:      The following orders were created for panel order Clostridioides difficile Toxin - Stool, Per Rectum.  Procedure                               Abnormality         Status                     ---------                               -----------         ------                     Clostridioides difficile...[[id number]]  Normal              Final result                 Please view results for these tests on the individual orders.    Clostridioides difficile Toxin, PCR - Stool, Per Rectum [[id number]]  (Normal) Collected: 11/01/22 0332    Lab Status: Final result Specimen: Stool from Per Rectum Updated: 11/01/22 0844     C. Difficile Toxins by PCR Not Detected    Narrative:      The result indicates the absence of toxigenic C. difficile from stool specimen.     Blood Culture - Blood, Hand, Right [[id number]]  (Normal) Collected: 10/31/22 1213    Lab Status: Preliminary result Specimen: Blood from Hand, Right Updated: 11/02/22 1331     Blood Culture No growth at 2 days    Blood Culture - Blood, Arm, Right [[id number]]  (Normal) Collected: 10/31/22 1205    Lab Status: Preliminary result Specimen: Blood from Arm, Right Updated: 11/02/22 1531     Blood  Culture No growth at 2 days    COVID PRE-OP / PRE-PROCEDURE SCREENING ORDER (NO ISOLATION) - Swab, Nasopharynx [[id number]]  (Normal) Collected: 10/31/22 0821    Lab Status: Final result Specimen: Swab from Nasopharynx Updated: 10/31/22 0909    Narrative:      The following orders were created for panel order COVID PRE-OP / PRE-PROCEDURE SCREENING ORDER (NO ISOLATION) - Swab, Nasopharynx.  Procedure                               Abnormality         Status                     ---------                               -----------         ------                     COVID-19 and FLU A/B PCR...[[id number]]  Normal              Final result                 Please view results for these tests on the individual orders.    COVID-19 and FLU A/B PCR - Swab, Nasopharynx [[id number]]  (Normal) Collected: 10/31/22 0821    Lab Status: Final result Specimen: Swab from Nasopharynx Updated: 10/31/22 0909     COVID19 Not Detected     Influenza A PCR Not Detected     Influenza B PCR Not Detected    Narrative:      Fact sheet for providers: https://www.fda.gov/media/650394/download    Fact sheet for patients: https://www.fda.gov/media/532349/download    Test performed by PCR.    Urine Culture - Urine, Urine, Clean Catch [[id number]]  (Abnormal)  (Susceptibility) Collected: 10/31/22 0815    Lab Status: Final result Specimen: Urine, Clean Catch Updated: 11/03/22 0944     Urine Culture >100,000 CFU/mL Klebsiella pneumoniae ssp pneumoniae    Narrative:      Colonization of the urinary tract without infection is common. Treatment is discouraged unless the patient is symptomatic, pregnant, or undergoing an invasive urologic procedure.    Susceptibility      Klebsiella pneumoniae ssp pneumoniae      QASIM      Ampicillin Resistant     Ampicillin + Sulbactam Intermediate      Cefazolin Susceptible      Cefepime Susceptible      Ceftazidime Susceptible      Ceftriaxone Susceptible      Gentamicin Susceptible      Levofloxacin Intermediate       Nitrofurantoin Resistant     Piperacillin + Tazobactam Susceptible      Trimethoprim + Sulfamethoxazole Resistant                                Adult Transthoracic Echo Complete W/ Cont if Necessary Per Protocol (With Agitated Saline)    Result Date: 11/1/2022  •  Left ventricular systolic function is normal. Left ventricular ejection fraction appears to be 66 - 70%. •  Saline test results are negative. •  There is calcification of the aortic valve. •  Estimated right ventricular systolic pressure from tricuspid regurgitation is normal (<35 mmHg). Calculated right ventricular systolic pressure from tricuspid regurgitation is 26 mmHg.     CT Head Without Contrast    Result Date: 10/31/2022   DATE OF EXAM: 10/31/2022 9:13 AM  PROCEDURE: CT HEAD WO CONTRAST-  INDICATIONS: severe headache;  s/p recent left thalamic stroke  COMPARISON: No Comparisons Available  TECHNIQUE: Routine transaxial cuts were obtained through the head without the administration of contrast. Automated exposure control and iterative reconstruction methods were used.  FINDINGS: There is no underlying hemorrhage. There is no midline shift. The ventricles are not dilated. The reported areas of restricted diffusion involving the left cerebral hemisphere on more recent MRI the brain which is not available for comparison is not well-defined on this exam. There is some hypodensity in the left thalamic area which may relate to subacute infarct. There is no underlying hemorrhage.      1.  Hypodensity within the left thalamic area that could relate to a subacute infarct given history of restricted diffusion in this area on more recent MR which is not available for comparison from outside institution.  This report was finalized on 10/31/2022 9:38 AM by Chico Herron MD.      CT Angiogram Neck    Result Date: 10/31/2022  EXAMINATION: CT ANGIOGRAM HEAD W AI ANALYSIS OF LVO-, CT ANGIOGRAM NECK-   INDICATION: Severe headaches, recent left thalamic stroke. The  patient had an outside CT angiogram raising question of a right vertebral artery dissection.  TECHNIQUE: CTA of the head was performed after the intravenous administration of 125 mL of Isovue 370. Reconstructed coronal and sagittal images were also obtained. In addition, a 3-D volume rendered image was obtained after post processing. Automated exposure control and iterative reconstruction methods were used.  The radiation dose reduction device was turned on for each scan per the ALARA (As Low as Reasonably Achievable) protocol.  AI analysis of LVO was utilized.  Stenosis measurement was performed by the NASCET or similar method.  COMPARISON: CT of the head performed on 10/31/2022. The outside CT angiogram of the head and neck is unavailable.  FINDINGS: Vascular: There is no large vessel occlusion or filling defect indicate cerebral thrombus. The anterior, middle, and posterior cerebral arteries are patent. The basilar artery is patent. There is no saccular aneurysm or arteriovenous malformation. There is discontinuous flow seen in the right vertebral artery. The patent portions of the right vertebral artery are also small in caliber. This could be on the basis of a chronic dissection versus hypoplasia with atherosclerotic vascular disease causing the areas of occlusion. The left vertebral artery originates from the proximal left subclavian artery and is widely patent. It is the dominant blood supply to the posterior circulation. There is a bulky calcified plaque at both carotid bifurcations. The degree of stenosis in the proximal right internal carotid artery measures 52%. The degree of stenosis in the proximal left internal carotid artery measures 23%. There is also calcified plaque in the thoracic aortic arch with involvement of the innominate, proximal right subclavian artery, and the proximal left subclavian artery. There is no hemodynamically significant stenosis.  Nonvascular: There is no abnormal intracranial  contrast enhancement. There are no enlarged cervical lymph nodes. The parotid and submandibular glands are unremarkable. The nasopharynx, palatine tonsils, epiglottis, piriform sinuses, and larynx are unremarkable. The thyroid gland is not enlarged. The pulmonary apices are clear. There are no suspicious osteolytic or sclerotic lesions within the bony structures.       1. No large vessel intracranial arterial occlusion or filling defect to indicate cerebral thrombus. 2. Small caliber right vertebral artery with discontinuous flow. This could be on the basis of a chronic dissection versus hypoplasia with the atherosclerotic vascular disease causing areas of occlusion. 3. The left vertebral artery is dominant. It is widely patent. 4. Bilateral carotid artery stenosis. It measures 52% on the right side and 23% on the left side. 5. Additional vascular and nonvascular findings as noted above.  This report was finalized on 10/31/2022 9:56 AM by Ruben Garcia MD.      MRI Brain Without Contrast    Result Date: 11/1/2022  DATE OF EXAM: 11/1/2022 8:30 AM  PROCEDURE: MRI BRAIN WO CONTRAST-  INDICATIONS: Stroke, follow up; R41.82-Altered mental status, unspecified; N39.0-Urinary tract infection, site not specified; E87.6-Hypokalemia; R51.9-Headache, unspecified; I63.81-Other cerebral infarction due to occlusion or stenosis of small artery  COMPARISON: Head CT 10/31/2022  TECHNIQUE: Multiplanar multisequence images of the brain were performed without contrast according to routine brain MRI protocol.  FINDINGS: There are small scattered acute lacunar infarcts of the left thalamus, left caudate body, and splenium of the left corpus callosum (series 3 image [id number]). No intracranial hemorrhage. No intracranial mass. Other than the above-described regions of acute lacunar infarcts, gray-white differentiation appears grossly preserved. No extra-axial collections. No midline shift or herniation. Normal size and configuration of the  ventricles. The cerebellopontine angles and midline structures are normal. The major intracranial vascular flow voids are preserved. Normal appearance of the orbits. There is mild mucosal thickening in the right maxillary sinus. The mastoid air cells are clear. No acute or suspicious bony findings.      Small scattered acute lacunar infarcts of the left thalamus, left caudate body, and left splenium of the corpus callosum.  Findings discussed with stroke navigator by Dr. Harrison Turk via telephone at 8:57 AM 11/1/2022.  This report was finalized on 11/1/2022 8:57 AM by Harrison Turk MD.      MRI Brain Without Contrast    Result Date: 10/24/2022  MRI OF THE BRAIN  HISTORY: Neuro deficit, acute, stroke suspected; I63.9-Cerebral infarction, unspecified; I65.09-Occlusion and stenosis of unspecified vertebral artery; H53.40-Unspecified visual field defects; E16.2-Hypoglycemia, unspecified; R41.82-Altered mental status, unspecified; N39.0-Urinary tract infection, site not specified Headache.  PROCEDURE: Multiplanar MR imaging of the brain was performed in multiple MR sequences.  COMPARISON: CT head dated 10/22/2022  FINDINGS: There are a few small areas of restricted diffusion in the left lateral ventricle peritrigonal region involving the left thalamus, tail of left caudate nucleus, and parasagittal/peritrigonal left occipital lobe, consistent with acute infarcts (series 6, image 11-14). No evidence of hemorrhagic conversion.  The ventricles are normal in size. There is no extra-axial fluid or midline shift. Flow-voids are appropriate. Diffusion weighted images demonstrate no evidence of acute CVA. Limited images of the paranasal sinuses are unremarkable. Limited images the proximal cord are unremarkable.      There are a few small areas of acute infarcts in the left lateral ventricle peritrigonal region involving the left thalamus, tail of left caudate nucleus, and parasagittal/peritrigonal left occipital lobe. No  evidence of hemorrhagic conversion.    These findings were discussed with Ms. Dl ALLAN on 10/24/2022 10:36 AM by Silvia Echols PA-C, over the phone.   Images personally reviewed, interpreted and dictated by JHONNY Burrell.  This report was signed and finalized on 10/24/2022 10:39 AM by JHONNY Burrell.    CT Angiogram Head w AI Analysis of LVO    Result Date: 10/31/2022  EXAMINATION: CT ANGIOGRAM HEAD W AI ANALYSIS OF LVO-, CT ANGIOGRAM NECK-   INDICATION: Severe headaches, recent left thalamic stroke. The patient had an outside CT angiogram raising question of a right vertebral artery dissection.  TECHNIQUE: CTA of the head was performed after the intravenous administration of 125 mL of Isovue 370. Reconstructed coronal and sagittal images were also obtained. In addition, a 3-D volume rendered image was obtained after post processing. Automated exposure control and iterative reconstruction methods were used.  The radiation dose reduction device was turned on for each scan per the ALARA (As Low as Reasonably Achievable) protocol.  AI analysis of LVO was utilized.  Stenosis measurement was performed by the NASCET or similar method.  COMPARISON: CT of the head performed on 10/31/2022. The outside CT angiogram of the head and neck is unavailable.  FINDINGS: Vascular: There is no large vessel occlusion or filling defect indicate cerebral thrombus. The anterior, middle, and posterior cerebral arteries are patent. The basilar artery is patent. There is no saccular aneurysm or arteriovenous malformation. There is discontinuous flow seen in the right vertebral artery. The patent portions of the right vertebral artery are also small in caliber. This could be on the basis of a chronic dissection versus hypoplasia with atherosclerotic vascular disease causing the areas of occlusion. The left vertebral artery originates from the proximal left subclavian artery and is widely patent. It is the dominant blood supply  to the posterior circulation. There is a bulky calcified plaque at both carotid bifurcations. The degree of stenosis in the proximal right internal carotid artery measures 52%. The degree of stenosis in the proximal left internal carotid artery measures 23%. There is also calcified plaque in the thoracic aortic arch with involvement of the innominate, proximal right subclavian artery, and the proximal left subclavian artery. There is no hemodynamically significant stenosis.  Nonvascular: There is no abnormal intracranial contrast enhancement. There are no enlarged cervical lymph nodes. The parotid and submandibular glands are unremarkable. The nasopharynx, palatine tonsils, epiglottis, piriform sinuses, and larynx are unremarkable. The thyroid gland is not enlarged. The pulmonary apices are clear. There are no suspicious osteolytic or sclerotic lesions within the bony structures.       1. No large vessel intracranial arterial occlusion or filling defect to indicate cerebral thrombus. 2. Small caliber right vertebral artery with discontinuous flow. This could be on the basis of a chronic dissection versus hypoplasia with the atherosclerotic vascular disease causing areas of occlusion. 3. The left vertebral artery is dominant. It is widely patent. 4. Bilateral carotid artery stenosis. It measures 52% on the right side and 23% on the left side. 5. Additional vascular and nonvascular findings as noted above.  This report was finalized on 10/31/2022 9:56 AM by Ruben Garcia MD.                Results for orders placed during the hospital encounter of 10/31/22    Adult Transthoracic Echo Complete W/ Cont if Necessary Per Protocol (With Agitated Saline)    Interpretation Summary  •  Left ventricular systolic function is normal. Left ventricular ejection fraction appears to be 66 - 70%.  •  Saline test results are negative.  •  There is calcification of the aortic valve.  •  Estimated right ventricular systolic pressure from  tricuspid regurgitation is normal (<35 mmHg). Calculated right ventricular systolic pressure from tricuspid regurgitation is 26 mmHg.        Pending Labs     Order Current Status    Blood Culture - Blood, Arm, Right Preliminary result    Blood Culture - Blood, Hand, Right Preliminary result        Discharge Details        Discharge Medications      New Medications      Instructions Start Date   cefuroxime 250 MG tablet  Commonly known as: CEFTIN   250 mg, Oral, Every 12 Hours Scheduled         Changes to Medications      Instructions Start Date   amLODIPine 10 MG tablet  Commonly known as: Norvasc  What changed:   · medication strength  · how much to take   10 mg, Oral, Daily         Continue These Medications      Instructions Start Date   Admelog SoloStar 100 UNIT/ML solution pen-injector  Generic drug: Insulin Lispro (1 Unit Dial)   25 units three times daily before meals plus 3 extra units for every 50 points your BG is >150,  units      albuterol sulfate  (90 Base) MCG/ACT inhaler  Commonly known as: PROVENTIL HFA;VENTOLIN HFA;PROAIR HFA   INHALE 2 PUFFS PO Q 6 H PRN      Alogliptin Benzoate 25 MG tablet   25 mg, Oral, Daily      aspirin 81 MG chewable tablet   81 mg, Oral, Daily      atorvastatin 80 MG tablet  Commonly known as: LIPITOR   80 mg, Oral, Nightly      clopidogrel 75 MG tablet  Commonly known as: Plavix   75 mg, Oral, Daily      fenofibrate 160 MG tablet   TK 1 T PO QD WITH A MEAL      losartan 50 MG tablet  Commonly known as: COZAAR   50 mg, Oral, Daily, Hold unless SBP > 160      omeprazole 20 MG capsule  Commonly known as: priLOSEC   Hold while taking plavix      ONE TOUCH ULTRA TEST test strip  Generic drug: glucose blood   USE TO TEST TID AND PRN      glucose blood test strip   1 each, Other, 3 Times Daily, Use as instructed - One Touch Verio      OneTouch Delica Plus [id number] misc   USE TO TEST TID AND PRN         Stop These Medications    cetirizine 10 MG tablet  Commonly  known as: zyrTEC     cyclobenzaprine 10 MG tablet  Commonly known as: FLEXERIL            Allergies   Allergen Reactions   • Metformin GI Intolerance         Discharge Disposition:  Home or Self Care    Diet:  Hospital:  Diet Order   Procedures   • Diet Regular; Consistent Carbohydrate       Activity:  Activity Instructions     Activity as Tolerated                   CODE STATUS:    Code Status and Medical Interventions:   Ordered at: 10/31/22 1222     Code Status (Patient has no pulse and is not breathing):    CPR (Attempt to Resuscitate)     Medical Interventions (Patient has pulse or is breathing):    Full Support       Future Appointments   Date Time Provider Department Center   11/8/2022  2:30 PM Jerel Agudelo MD [facility]   11/10/2022  3:00 PM [facility]   12/12/2022 11:30 AM [facility]       Additional Instructions for the Follow-ups that You Need to Schedule     Discharge Follow-up with PCP   As directed       Currently Documented PCP:    Jerel Agudelo MD    PCP Phone Number:    [phone]     Follow Up Details: within 7 days     Follow up with neurology as per schedule                    Lino Damian MD  11/03/22      Time Spent on Discharge:  I spent  38  minutes on this discharge activity which included: face-to-face encounter with the patient, reviewing the data in the system, coordination of the care with the nursing staff as well as consultants, documentation, and entering orders.

## 2022-11-04 ENCOUNTER — TRANSITIONAL CARE MANAGEMENT TELEPHONE ENCOUNTER (OUTPATIENT)
Dept: CALL CENTER | Facility: HOSPITAL | Age: 47
End: 2022-11-04

## 2022-11-04 NOTE — OUTREACH NOTE
Call Center TCM Note    Flowsheet Row Responses   [facility] patient discharged from? [place]   Does the patient have one of the following disease processes/diagnoses(primary or secondary)? Other   TCM attempt successful? Yes  [No VR]   Call start time 1535   Call end time 1554   Discharge diagnosis Altered mental status  Headache and MRI evidence of  extension of previous CVA   Meds reviewed with patient/caregiver? Yes   Is the patient having any side effects they believe may be caused by any medication additions or changes? Yes  [diarrhea]   Does the patient have all medications ordered at discharge? Yes   Is the patient taking all medications as directed (includes completed medication regime)? Yes   Comments  11/8/2022  2:30 PM    Does the patient have an appointment with their PCP within 7 days of discharge? Yes   Psychosocial issues? No   Comments HA resumed and right blurred vision remains. Monitoring BP at home 183/93 before meds. Glucose has been 190-203.Pt trying to follow ADA diet.   Did the patient receive a copy of their discharge instructions? Yes   Nursing interventions Reviewed instructions with patient   What is the patient's perception of their health status since discharge? Same   Is the patient/caregiver able to teach back signs and symptoms related to disease process for when to call PCP? Yes   Is the patient/caregiver able to teach back signs and symptoms related to disease process for when to call 911? Yes   Is the patient/caregiver able to teach back the hierarchy of who to call/visit for symptoms/problems? PCP, Specialist, Home health nurse, Urgent Care, ED, 911 Yes   If the patient is a current smoker, are they able to teach back resources for cessation? Not a smoker  [continues to smoke but is trying not to inhale the cig. ]   TCM call completed? Yes   Call end time 1554   Would this patient benefit from a Referral to [facility] Social Work? No   Is the patient interested in additional  calls from an ambulatory ?  NOTE:  applies to high risk patients requiring additional follow-up. No          Gaby Rooney RN    11/4/2022, 15:54 EDT

## 2022-11-04 NOTE — OUTREACH NOTE
Prep Survey    Flowsheet Row Responses   [facility] patient discharged from? [place]   Is LACE score < 7 ? No   Emergency Room discharge w/ pulse ox? No   Eligibility [facility]   Date of Admission 10/31/22   Date of Discharge 11/03/22   Discharge Disposition Home or Self Care   Discharge diagnosis Altered mental status  Headache and MRI evidence of  extension of previous CVA   Does the patient have one of the following disease processes/diagnoses(primary or secondary)? Other   Does the patient have Home health ordered? No   Is there a DME ordered? No   Prep survey completed? Yes          NIKHIL ANDRE - Registered Nurse

## 2022-11-05 LAB
BACTERIA SPEC AEROBE CULT: NORMAL
BACTERIA SPEC AEROBE CULT: NORMAL

## 2022-11-08 ENCOUNTER — OFFICE VISIT (OUTPATIENT)
Dept: FAMILY MEDICINE CLINIC | Facility: CLINIC | Age: 47
End: 2022-11-08

## 2022-11-08 VITALS
BODY MASS INDEX: 35.3 KG/M2 | WEIGHT: 187 LBS | HEART RATE: 86 BPM | DIASTOLIC BLOOD PRESSURE: 72 MMHG | HEIGHT: 61 IN | RESPIRATION RATE: 18 BRPM | TEMPERATURE: 98 F | SYSTOLIC BLOOD PRESSURE: 118 MMHG

## 2022-11-08 DIAGNOSIS — H53.8 BLURRY VISION, RIGHT EYE: ICD-10-CM

## 2022-11-08 DIAGNOSIS — Z09 HOSPITAL DISCHARGE FOLLOW-UP: ICD-10-CM

## 2022-11-08 DIAGNOSIS — Z79.4 TYPE 2 DIABETES MELLITUS WITH HYPERGLYCEMIA, WITH LONG-TERM CURRENT USE OF INSULIN: ICD-10-CM

## 2022-11-08 DIAGNOSIS — E11.65 TYPE 2 DIABETES MELLITUS WITH HYPERGLYCEMIA, WITH LONG-TERM CURRENT USE OF INSULIN: ICD-10-CM

## 2022-11-08 DIAGNOSIS — E11.40 TYPE 2 DIABETES MELLITUS WITH DIABETIC NEUROPATHY, WITH LONG-TERM CURRENT USE OF INSULIN: ICD-10-CM

## 2022-11-08 DIAGNOSIS — Z79.4 LONG-TERM INSULIN USE: ICD-10-CM

## 2022-11-08 DIAGNOSIS — Z86.73 RECENT CEREBROVASCULAR ACCIDENT (CVA): ICD-10-CM

## 2022-11-08 DIAGNOSIS — Z79.4 TYPE 2 DIABETES MELLITUS WITH DIABETIC NEUROPATHY, WITH LONG-TERM CURRENT USE OF INSULIN: ICD-10-CM

## 2022-11-08 DIAGNOSIS — E11.319 DIABETIC RETINOPATHY ASSOCIATED WITH TYPE 2 DIABETES MELLITUS, MACULAR EDEMA PRESENCE UNSPECIFIED, UNSPECIFIED LATERALITY, UNSPECIFIED RETINOPATHY SEVERITY: Primary | ICD-10-CM

## 2022-11-08 PROCEDURE — 99214 OFFICE O/P EST MOD 30 MIN: CPT | Performed by: FAMILY MEDICINE

## 2022-11-08 RX ORDER — INSULIN LISPRO 100 [IU]/ML
INJECTION, SOLUTION INTRAVENOUS; SUBCUTANEOUS
COMMUNITY
End: 2022-11-29

## 2022-11-08 RX ORDER — PREGABALIN 300 MG/1
CAPSULE ORAL
COMMUNITY
Start: 2022-09-06 | End: 2022-11-08 | Stop reason: SDUPTHER

## 2022-11-08 RX ORDER — EMPAGLIFLOZIN 25 MG/1
TABLET, FILM COATED ORAL
COMMUNITY
Start: 2022-10-12 | End: 2022-12-21 | Stop reason: SDUPTHER

## 2022-11-08 RX ORDER — INSULIN DETEMIR 100 [IU]/ML
40 INJECTION, SOLUTION SUBCUTANEOUS NIGHTLY
Qty: 15 ML | Refills: 1 | Status: SHIPPED | OUTPATIENT
Start: 2022-11-08 | End: 2022-12-15 | Stop reason: ALTCHOICE

## 2022-11-08 RX ORDER — BLOOD-GLUCOSE SENSOR
1 EACH MISCELLANEOUS
Qty: 2 EACH | Refills: 11 | Status: SHIPPED | OUTPATIENT
Start: 2022-11-08 | End: 2023-04-06

## 2022-11-08 RX ORDER — INSULIN DETEMIR 100 [IU]/ML
INJECTION, SOLUTION SUBCUTANEOUS
COMMUNITY
Start: 2022-10-12 | End: 2022-11-08 | Stop reason: SDUPTHER

## 2022-11-08 RX ORDER — CIPROFLOXACIN 500 MG/1
TABLET, FILM COATED ORAL
COMMUNITY
Start: 2022-10-31 | End: 2022-11-08

## 2022-11-08 RX ORDER — SITAGLIPTIN 100 MG/1
TABLET, FILM COATED ORAL
COMMUNITY
Start: 2022-10-12 | End: 2022-12-15

## 2022-11-08 RX ORDER — PREGABALIN 300 MG/1
300 CAPSULE ORAL 2 TIMES DAILY
Qty: 60 CAPSULE | Refills: 2 | Status: SHIPPED | OUTPATIENT
Start: 2022-11-08 | End: 2023-02-13 | Stop reason: SDUPTHER

## 2022-11-08 NOTE — PROGRESS NOTES
Transitional Care Follow Up Visit  Subjective     Rachana Patrick is a 47 y.o. female who presents for a transitional care management visit.    Within 48 business hours after discharge our office contacted her via telephone to coordinate her care and needs.      I reviewed and discussed the details of that call along with the discharge summary, hospital problems, inpatient lab results, inpatient diagnostic studies, and consultation reports with Rachana.     Current outpatient and discharge medications have been reconciled for the patient.  Reviewed by: Jerel Agudelo MD      Date of TCM Phone Call 11/3/2022   [facility]   Date of Admission 10/31/2022   Date of Discharge 11/3/2022   Discharge Disposition Home or Self Care     Risk for Readmission (LACE) Score: 8 (11/3/2022  6:00 AM)      History of Present Illness   Course During Hospital Stay: Patient was admitted with altered mental status with findings of extension of CVA from 1 week prior, urinary tract infection with Klebsiella pneumonia, and out-of-control diabetes.     The following portions of the patient's history were reviewed and updated as appropriate: allergies, current medications, past family history, past medical history, past social history, past surgical history and problem list.    Review of Systems    Objective   Physical Exam  Constitutional:       Appearance: Normal appearance.   Cardiovascular:      Rate and Rhythm: Normal rate and regular rhythm.      Pulses: Normal pulses.      Heart sounds: Normal heart sounds.   Pulmonary:      Effort: Pulmonary effort is normal.      Breath sounds: Normal breath sounds.   Neurological:      Mental Status: She is alert.         Assessment & Plan   Diagnoses and all orders for this visit:    1. Diabetic retinopathy associated with type 2 diabetes mellitus, macular edema presence unspecified, unspecified laterality, unspecified retinopathy severity (HCC) (Primary)  Comments:  Refer to  ophthalmology  Orders:  -     Ambulatory Referral to Ophthalmology    2. Type 2 diabetes mellitus with hyperglycemia, with long-term current use of insulin (HCC)  Comments:  Increasing Levemir to 40 units at night.  CGM ordered.  Follow-up in 5 weeks.  Contact office for hypoglycemia  Orders:  -     Levemir FlexTouch 100 UNIT/ML injection; Inject 40 Units under the skin into the appropriate area as directed Every Night.  Dispense: 15 mL; Refill: 1  -     Continuous Blood Gluc Sensor (FreeStyle Tyson 3 Sensor) misc; 1 each Every 14 (Fourteen) Days.  Dispense: 2 each; Refill: 11  -     glucose blood test strip; 1 each by Other route 3 (Three) Times a Day. Use as instructed - One Touch Verio  Dispense: 270 each; Refill: 3    3. Blurry vision, right eye  -     Ambulatory Referral to Ophthalmology    4. Recent cerebrovascular accident (CVA)  Comments:  Emphasized the importance of taking all her medicines and working to get diabetes under control    5. Long-term insulin use (HCC)  -     Continuous Blood Gluc Sensor (FreeStyle Tyson 3 Sensor) misc; 1 each Every 14 (Fourteen) Days.  Dispense: 2 each; Refill: 11  -     glucose blood test strip; 1 each by Other route 3 (Three) Times a Day. Use as instructed - One Touch Verio  Dispense: 270 each; Refill: 3    6. Type 2 diabetes mellitus with diabetic neuropathy, with long-term current use of insulin (HCC)  Comments:  Refilled Lyrica  Orders:  -     pregabalin (LYRICA) 300 MG capsule; Take 1 capsule by mouth 2 (Two) Times a Day.  Dispense: 60 capsule; Refill: 2    7. Hospital discharge follow-up

## 2022-11-14 ENCOUNTER — APPOINTMENT (OUTPATIENT)
Dept: CT IMAGING | Facility: HOSPITAL | Age: 47
DRG: 066 | End: 2022-11-14
Payer: COMMERCIAL

## 2022-11-14 ENCOUNTER — APPOINTMENT (OUTPATIENT)
Dept: GENERAL RADIOLOGY | Facility: HOSPITAL | Age: 47
DRG: 066 | End: 2022-11-14
Payer: COMMERCIAL

## 2022-11-14 ENCOUNTER — HOSPITAL ENCOUNTER (INPATIENT)
Facility: HOSPITAL | Age: 47
LOS: 1 days | Discharge: LEFT BEFORE DISCHARGE | DRG: 066 | End: 2022-11-15
Attending: EMERGENCY MEDICINE | Admitting: INTERNAL MEDICINE
Payer: COMMERCIAL

## 2022-11-14 DIAGNOSIS — I63.9 CEREBROVASCULAR ACCIDENT (CVA), UNSPECIFIED MECHANISM: ICD-10-CM

## 2022-11-14 DIAGNOSIS — I63.81 THALAMIC STROKE: Primary | ICD-10-CM

## 2022-11-14 DIAGNOSIS — D72.829 LEUKOCYTOSIS, UNSPECIFIED TYPE: ICD-10-CM

## 2022-11-14 DIAGNOSIS — R41.82 ALTERED MENTAL STATUS, UNSPECIFIED ALTERED MENTAL STATUS TYPE: ICD-10-CM

## 2022-11-14 LAB
ALBUMIN SERPL-MCNC: 4.4 G/DL (ref 3.5–5.2)
ALBUMIN/GLOB SERPL: 1.1 G/DL
ALP SERPL-CCNC: 144 U/L (ref 39–117)
ALT SERPL W P-5'-P-CCNC: 25 U/L (ref 1–33)
AMPHET+METHAMPHET UR QL: NEGATIVE
AMPHETAMINES UR QL: NEGATIVE
ANION GAP SERPL CALCULATED.3IONS-SCNC: 12 MMOL/L (ref 5–15)
APTT PPP: 33.6 SECONDS (ref 22–39)
AST SERPL-CCNC: 22 U/L (ref 1–32)
BARBITURATES UR QL SCN: NEGATIVE
BASOPHILS # BLD AUTO: 0.11 10*3/MM3 (ref 0–0.2)
BASOPHILS NFR BLD AUTO: 0.4 % (ref 0–1.5)
BENZODIAZ UR QL SCN: NEGATIVE
BILIRUB SERPL-MCNC: 0.9 MG/DL (ref 0–1.2)
BUN SERPL-MCNC: 10 MG/DL (ref 6–20)
BUN/CREAT SERPL: 13.5 (ref 7–25)
BUPRENORPHINE SERPL-MCNC: NEGATIVE NG/ML
CALCIUM SPEC-SCNC: 9.7 MG/DL (ref 8.6–10.5)
CANNABINOIDS SERPL QL: POSITIVE
CHLORIDE SERPL-SCNC: 100 MMOL/L (ref 98–107)
CO2 SERPL-SCNC: 25 MMOL/L (ref 22–29)
COCAINE UR QL: NEGATIVE
CREAT SERPL-MCNC: 0.74 MG/DL (ref 0.57–1)
D-LACTATE SERPL-SCNC: 1.5 MMOL/L (ref 0.5–2)
DEPRECATED RDW RBC AUTO: 41.3 FL (ref 37–54)
EGFRCR SERPLBLD CKD-EPI 2021: 99.3 ML/MIN/1.73
EOSINOPHIL # BLD AUTO: 0.19 10*3/MM3 (ref 0–0.4)
EOSINOPHIL NFR BLD AUTO: 0.7 % (ref 0.3–6.2)
ERYTHROCYTE [DISTWIDTH] IN BLOOD BY AUTOMATED COUNT: 12.5 % (ref 12.3–15.4)
ETHANOL BLD-MCNC: <10 MG/DL (ref 0–10)
GLOBULIN UR ELPH-MCNC: 3.9 GM/DL
GLUCOSE SERPL-MCNC: 167 MG/DL (ref 65–99)
HCT VFR BLD AUTO: 45.9 % (ref 34–46.6)
HGB BLD-MCNC: 15.2 G/DL (ref 12–15.9)
HOLD SPECIMEN: NORMAL
HOLD SPECIMEN: NORMAL
IMM GRANULOCYTES # BLD AUTO: 0.2 10*3/MM3 (ref 0–0.05)
IMM GRANULOCYTES NFR BLD AUTO: 0.7 % (ref 0–0.5)
INR PPP: 1.09 (ref 0.84–1.13)
LIPASE SERPL-CCNC: 15 U/L (ref 13–60)
LYMPHOCYTES # BLD AUTO: 1.83 10*3/MM3 (ref 0.7–3.1)
LYMPHOCYTES NFR BLD AUTO: 6.4 % (ref 19.6–45.3)
MCH RBC QN AUTO: 29.9 PG (ref 26.6–33)
MCHC RBC AUTO-ENTMCNC: 33.1 G/DL (ref 31.5–35.7)
MCV RBC AUTO: 90.2 FL (ref 79–97)
METHADONE UR QL SCN: NEGATIVE
MONOCYTES # BLD AUTO: 2.57 10*3/MM3 (ref 0.1–0.9)
MONOCYTES NFR BLD AUTO: 9 % (ref 5–12)
NEUTROPHILS NFR BLD AUTO: 23.77 10*3/MM3 (ref 1.7–7)
NEUTROPHILS NFR BLD AUTO: 82.8 % (ref 42.7–76)
NRBC BLD AUTO-RTO: 0 /100 WBC (ref 0–0.2)
NT-PROBNP SERPL-MCNC: 395.2 PG/ML (ref 0–450)
OPIATES UR QL: NEGATIVE
OXYCODONE UR QL SCN: NEGATIVE
PCP UR QL SCN: NEGATIVE
PLATELET # BLD AUTO: 285 10*3/MM3 (ref 140–450)
PMV BLD AUTO: 10.7 FL (ref 6–12)
POTASSIUM SERPL-SCNC: 4 MMOL/L (ref 3.5–5.2)
PROPOXYPH UR QL: NEGATIVE
PROT SERPL-MCNC: 8.3 G/DL (ref 6–8.5)
PROTHROMBIN TIME: 14.1 SECONDS (ref 11.4–14.4)
QT INTERVAL: 334 MS
QTC INTERVAL: 433 MS
RBC # BLD AUTO: 5.09 10*6/MM3 (ref 3.77–5.28)
SODIUM SERPL-SCNC: 137 MMOL/L (ref 136–145)
TRICYCLICS UR QL SCN: NEGATIVE
TROPONIN T SERPL-MCNC: <0.01 NG/ML (ref 0–0.03)
WBC NRBC COR # BLD: 28.67 10*3/MM3 (ref 3.4–10.8)
WHOLE BLOOD HOLD COAG: NORMAL
WHOLE BLOOD HOLD SPECIMEN: NORMAL

## 2022-11-14 PROCEDURE — 87040 BLOOD CULTURE FOR BACTERIA: CPT | Performed by: EMERGENCY MEDICINE

## 2022-11-14 PROCEDURE — 87086 URINE CULTURE/COLONY COUNT: CPT

## 2022-11-14 PROCEDURE — 25010000002 CEFTRIAXONE PER 250 MG: Performed by: EMERGENCY MEDICINE

## 2022-11-14 PROCEDURE — 84484 ASSAY OF TROPONIN QUANT: CPT | Performed by: EMERGENCY MEDICINE

## 2022-11-14 PROCEDURE — 0202U NFCT DS 22 TRGT SARS-COV-2: CPT | Performed by: EMERGENCY MEDICINE

## 2022-11-14 PROCEDURE — 36415 COLL VENOUS BLD VENIPUNCTURE: CPT

## 2022-11-14 PROCEDURE — 85610 PROTHROMBIN TIME: CPT

## 2022-11-14 PROCEDURE — 80306 DRUG TEST PRSMV INSTRMNT: CPT | Performed by: EMERGENCY MEDICINE

## 2022-11-14 PROCEDURE — 81001 URINALYSIS AUTO W/SCOPE: CPT

## 2022-11-14 PROCEDURE — 93005 ELECTROCARDIOGRAM TRACING: CPT

## 2022-11-14 PROCEDURE — 71045 X-RAY EXAM CHEST 1 VIEW: CPT

## 2022-11-14 PROCEDURE — 99291 CRITICAL CARE FIRST HOUR: CPT | Performed by: INTERNAL MEDICINE

## 2022-11-14 PROCEDURE — 80053 COMPREHEN METABOLIC PANEL: CPT | Performed by: EMERGENCY MEDICINE

## 2022-11-14 PROCEDURE — 84145 PROCALCITONIN (PCT): CPT | Performed by: INTERNAL MEDICINE

## 2022-11-14 PROCEDURE — 70450 CT HEAD/BRAIN W/O DYE: CPT

## 2022-11-14 PROCEDURE — 82550 ASSAY OF CK (CPK): CPT | Performed by: INTERNAL MEDICINE

## 2022-11-14 PROCEDURE — 99285 EMERGENCY DEPT VISIT HI MDM: CPT

## 2022-11-14 PROCEDURE — 82077 ASSAY SPEC XCP UR&BREATH IA: CPT | Performed by: EMERGENCY MEDICINE

## 2022-11-14 PROCEDURE — 85025 COMPLETE CBC W/AUTO DIFF WBC: CPT | Performed by: EMERGENCY MEDICINE

## 2022-11-14 PROCEDURE — 99223 1ST HOSP IP/OBS HIGH 75: CPT

## 2022-11-14 PROCEDURE — 83605 ASSAY OF LACTIC ACID: CPT | Performed by: EMERGENCY MEDICINE

## 2022-11-14 PROCEDURE — 83690 ASSAY OF LIPASE: CPT | Performed by: EMERGENCY MEDICINE

## 2022-11-14 PROCEDURE — 93005 ELECTROCARDIOGRAM TRACING: CPT | Performed by: EMERGENCY MEDICINE

## 2022-11-14 PROCEDURE — 85730 THROMBOPLASTIN TIME PARTIAL: CPT

## 2022-11-14 PROCEDURE — 83880 ASSAY OF NATRIURETIC PEPTIDE: CPT | Performed by: EMERGENCY MEDICINE

## 2022-11-14 RX ORDER — LORAZEPAM 1 MG/1
1 TABLET ORAL ONCE
Status: COMPLETED | OUTPATIENT
Start: 2022-11-14 | End: 2022-11-14

## 2022-11-14 RX ORDER — HALOPERIDOL 5 MG/ML
10 INJECTION INTRAMUSCULAR ONCE
Status: DISCONTINUED | OUTPATIENT
Start: 2022-11-14 | End: 2022-11-15 | Stop reason: HOSPADM

## 2022-11-14 RX ORDER — SODIUM CHLORIDE 0.9 % (FLUSH) 0.9 %
10 SYRINGE (ML) INJECTION EVERY 12 HOURS SCHEDULED
Status: DISCONTINUED | OUTPATIENT
Start: 2022-11-14 | End: 2022-11-15 | Stop reason: HOSPADM

## 2022-11-14 RX ORDER — ACETAMINOPHEN 500 MG
1000 TABLET ORAL ONCE
Status: COMPLETED | OUTPATIENT
Start: 2022-11-14 | End: 2022-11-14

## 2022-11-14 RX ORDER — ASPIRIN 81 MG/1
324 TABLET, CHEWABLE ORAL ONCE
Status: DISCONTINUED | OUTPATIENT
Start: 2022-11-14 | End: 2022-11-14

## 2022-11-14 RX ORDER — SODIUM CHLORIDE 0.9 % (FLUSH) 0.9 %
10 SYRINGE (ML) INJECTION AS NEEDED
Status: DISCONTINUED | OUTPATIENT
Start: 2022-11-14 | End: 2022-11-15 | Stop reason: HOSPADM

## 2022-11-14 RX ORDER — SODIUM CHLORIDE 0.9 % (FLUSH) 0.9 %
10 SYRINGE (ML) INJECTION AS NEEDED
Status: DISCONTINUED | OUTPATIENT
Start: 2022-11-14 | End: 2022-11-15

## 2022-11-14 RX ADMIN — ACETAMINOPHEN 1000 MG: 500 TABLET ORAL at 22:44

## 2022-11-14 RX ADMIN — SODIUM CHLORIDE 2 G: 900 INJECTION INTRAVENOUS at 22:48

## 2022-11-14 RX ADMIN — LORAZEPAM 1 MG: 1 TABLET ORAL at 22:45

## 2022-11-14 NOTE — Clinical Note
Level of Care: Critical Care [6]   Admitting Physician: CECELIA BYRD [[id number]]   Patient Class: Inpatient [101]

## 2022-11-15 ENCOUNTER — APPOINTMENT (OUTPATIENT)
Dept: CT IMAGING | Facility: HOSPITAL | Age: 47
DRG: 066 | End: 2022-11-15
Payer: COMMERCIAL

## 2022-11-15 ENCOUNTER — READMISSION MANAGEMENT (OUTPATIENT)
Dept: CALL CENTER | Facility: HOSPITAL | Age: 47
End: 2022-11-15

## 2022-11-15 ENCOUNTER — APPOINTMENT (OUTPATIENT)
Dept: MRI IMAGING | Facility: HOSPITAL | Age: 47
DRG: 066 | End: 2022-11-15
Payer: COMMERCIAL

## 2022-11-15 VITALS
OXYGEN SATURATION: 92 % | DIASTOLIC BLOOD PRESSURE: 95 MMHG | TEMPERATURE: 98.8 F | HEIGHT: 63 IN | SYSTOLIC BLOOD PRESSURE: 154 MMHG | WEIGHT: 181.44 LBS | BODY MASS INDEX: 32.15 KG/M2 | HEART RATE: 96 BPM | RESPIRATION RATE: 18 BRPM

## 2022-11-15 PROBLEM — N30.00 ACUTE CYSTITIS WITHOUT HEMATURIA: Status: ACTIVE | Noted: 2022-11-15

## 2022-11-15 PROBLEM — I61.3 LEFT-SIDED NONTRAUMATIC INTRACEREBRAL HEMORRHAGE OF BRAINSTEM: Status: ACTIVE | Noted: 2022-11-15

## 2022-11-15 LAB
ANION GAP SERPL CALCULATED.3IONS-SCNC: 10 MMOL/L (ref 5–15)
B PARAPERT DNA SPEC QL NAA+PROBE: NOT DETECTED
B PERT DNA SPEC QL NAA+PROBE: NOT DETECTED
BACTERIA UR QL AUTO: ABNORMAL /HPF
BILIRUB UR QL STRIP: NEGATIVE
BUN SERPL-MCNC: 13 MG/DL (ref 6–20)
BUN/CREAT SERPL: 15.1 (ref 7–25)
C PNEUM DNA NPH QL NAA+NON-PROBE: NOT DETECTED
CALCIUM SPEC-SCNC: 8.5 MG/DL (ref 8.6–10.5)
CHLORIDE SERPL-SCNC: 99 MMOL/L (ref 98–107)
CHOLEST SERPL-MCNC: 92 MG/DL (ref 0–200)
CK SERPL-CCNC: 50 U/L (ref 20–180)
CLARITY UR: ABNORMAL
CO2 SERPL-SCNC: 24 MMOL/L (ref 22–29)
COLOR UR: YELLOW
CREAT SERPL-MCNC: 0.86 MG/DL (ref 0.57–1)
CRP SERPL-MCNC: 4.79 MG/DL (ref 0–0.5)
DEPRECATED RDW RBC AUTO: 41.7 FL (ref 37–54)
EGFRCR SERPLBLD CKD-EPI 2021: 82.9 ML/MIN/1.73
ERYTHROCYTE [DISTWIDTH] IN BLOOD BY AUTOMATED COUNT: 12.5 % (ref 12.3–15.4)
ERYTHROCYTE [SEDIMENTATION RATE] IN BLOOD: 31 MM/HR (ref 0–20)
FLUAV SUBTYP SPEC NAA+PROBE: NOT DETECTED
FLUBV RNA ISLT QL NAA+PROBE: NOT DETECTED
GLUCOSE BLDC GLUCOMTR-MCNC: 175 MG/DL (ref 70–130)
GLUCOSE SERPL-MCNC: 238 MG/DL (ref 65–99)
GLUCOSE UR STRIP-MCNC: ABNORMAL MG/DL
HADV DNA SPEC NAA+PROBE: NOT DETECTED
HBA1C MFR BLD: 9.5 % (ref 4.8–5.6)
HCOV 229E RNA SPEC QL NAA+PROBE: NOT DETECTED
HCOV HKU1 RNA SPEC QL NAA+PROBE: NOT DETECTED
HCOV NL63 RNA SPEC QL NAA+PROBE: NOT DETECTED
HCOV OC43 RNA SPEC QL NAA+PROBE: NOT DETECTED
HCT VFR BLD AUTO: 39.6 % (ref 34–46.6)
HDLC SERPL-MCNC: 29 MG/DL (ref 40–60)
HGB BLD-MCNC: 13.2 G/DL (ref 12–15.9)
HGB UR QL STRIP.AUTO: NEGATIVE
HMPV RNA NPH QL NAA+NON-PROBE: NOT DETECTED
HOLD SPECIMEN: NORMAL
HPIV1 RNA ISLT QL NAA+PROBE: NOT DETECTED
HPIV2 RNA SPEC QL NAA+PROBE: NOT DETECTED
HPIV3 RNA NPH QL NAA+PROBE: NOT DETECTED
HPIV4 P GENE NPH QL NAA+PROBE: NOT DETECTED
KETONES UR QL STRIP: ABNORMAL
LDLC SERPL CALC-MCNC: 43 MG/DL (ref 0–100)
LDLC/HDLC SERPL: 1.46 {RATIO}
LEUKOCYTE ESTERASE UR QL STRIP.AUTO: ABNORMAL
M PNEUMO IGG SER IA-ACNC: NOT DETECTED
MAGNESIUM SERPL-MCNC: 2 MG/DL (ref 1.6–2.6)
MCH RBC QN AUTO: 29.8 PG (ref 26.6–33)
MCHC RBC AUTO-ENTMCNC: 33.3 G/DL (ref 31.5–35.7)
MCV RBC AUTO: 89.4 FL (ref 79–97)
NITRITE UR QL STRIP: NEGATIVE
PH UR STRIP.AUTO: <=5 [PH] (ref 5–8)
PHOSPHATE SERPL-MCNC: 2.6 MG/DL (ref 2.5–4.5)
PLATELET # BLD AUTO: 234 10*3/MM3 (ref 140–450)
PMV BLD AUTO: 10.7 FL (ref 6–12)
POTASSIUM SERPL-SCNC: 3.9 MMOL/L (ref 3.5–5.2)
PROCALCITONIN SERPL-MCNC: 0.06 NG/ML (ref 0–0.25)
PROT UR QL STRIP: NEGATIVE
RBC # BLD AUTO: 4.43 10*6/MM3 (ref 3.77–5.28)
RBC # UR STRIP: ABNORMAL /HPF
REF LAB TEST METHOD: ABNORMAL
RHINOVIRUS RNA SPEC NAA+PROBE: NOT DETECTED
RSV RNA NPH QL NAA+NON-PROBE: NOT DETECTED
SARS-COV-2 RNA NPH QL NAA+NON-PROBE: NOT DETECTED
SODIUM SERPL-SCNC: 133 MMOL/L (ref 136–145)
SP GR UR STRIP: 1.04 (ref 1–1.03)
SQUAMOUS #/AREA URNS HPF: ABNORMAL /HPF
TRIGL SERPL-MCNC: 103 MG/DL (ref 0–150)
UROBILINOGEN UR QL STRIP: ABNORMAL
VLDLC SERPL-MCNC: 20 MG/DL (ref 5–40)
WBC # UR STRIP: ABNORMAL /HPF
WBC NRBC COR # BLD: 20.76 10*3/MM3 (ref 3.4–10.8)
YEAST URNS QL MICRO: ABNORMAL /HPF

## 2022-11-15 PROCEDURE — 70450 CT HEAD/BRAIN W/O DYE: CPT

## 2022-11-15 PROCEDURE — 84100 ASSAY OF PHOSPHORUS: CPT | Performed by: INTERNAL MEDICINE

## 2022-11-15 PROCEDURE — 86140 C-REACTIVE PROTEIN: CPT | Performed by: NURSE PRACTITIONER

## 2022-11-15 PROCEDURE — 80048 BASIC METABOLIC PNL TOTAL CA: CPT | Performed by: INTERNAL MEDICINE

## 2022-11-15 PROCEDURE — 82962 GLUCOSE BLOOD TEST: CPT

## 2022-11-15 PROCEDURE — 83036 HEMOGLOBIN GLYCOSYLATED A1C: CPT

## 2022-11-15 PROCEDURE — 85652 RBC SED RATE AUTOMATED: CPT | Performed by: NURSE PRACTITIONER

## 2022-11-15 PROCEDURE — 83735 ASSAY OF MAGNESIUM: CPT | Performed by: INTERNAL MEDICINE

## 2022-11-15 PROCEDURE — 85027 COMPLETE CBC AUTOMATED: CPT | Performed by: INTERNAL MEDICINE

## 2022-11-15 PROCEDURE — 63710000001 INSULIN LISPRO (HUMAN) PER 5 UNITS: Performed by: INTERNAL MEDICINE

## 2022-11-15 PROCEDURE — 80061 LIPID PANEL: CPT

## 2022-11-15 RX ORDER — ACETAMINOPHEN 325 MG/1
650 TABLET ORAL EVERY 6 HOURS PRN
Status: DISCONTINUED | OUTPATIENT
Start: 2022-11-15 | End: 2022-11-15 | Stop reason: HOSPADM

## 2022-11-15 RX ORDER — INSULIN LISPRO 100 [IU]/ML
0-9 INJECTION, SOLUTION INTRAVENOUS; SUBCUTANEOUS
Status: DISCONTINUED | OUTPATIENT
Start: 2022-11-15 | End: 2022-11-15 | Stop reason: HOSPADM

## 2022-11-15 RX ORDER — NICOTINE POLACRILEX 4 MG
15 LOZENGE BUCCAL
Status: DISCONTINUED | OUTPATIENT
Start: 2022-11-15 | End: 2022-11-15 | Stop reason: HOSPADM

## 2022-11-15 RX ORDER — AMLODIPINE BESYLATE 10 MG/1
10 TABLET ORAL
Status: DISCONTINUED | OUTPATIENT
Start: 2022-11-15 | End: 2022-11-15 | Stop reason: HOSPADM

## 2022-11-15 RX ORDER — DEXTROSE MONOHYDRATE 25 G/50ML
25 INJECTION, SOLUTION INTRAVENOUS
Status: DISCONTINUED | OUTPATIENT
Start: 2022-11-15 | End: 2022-11-15 | Stop reason: HOSPADM

## 2022-11-15 RX ORDER — INSULIN LISPRO 100 [IU]/ML
2 INJECTION, SOLUTION INTRAVENOUS; SUBCUTANEOUS
Status: DISCONTINUED | OUTPATIENT
Start: 2022-11-15 | End: 2022-11-15 | Stop reason: HOSPADM

## 2022-11-15 RX ORDER — LORAZEPAM 2 MG/ML
0.5 INJECTION INTRAMUSCULAR ONCE
Status: DISCONTINUED | OUTPATIENT
Start: 2022-11-15 | End: 2022-11-15 | Stop reason: HOSPADM

## 2022-11-15 RX ADMIN — AMLODIPINE BESYLATE 10 MG: 10 TABLET ORAL at 10:03

## 2022-11-15 RX ADMIN — ACETAMINOPHEN 650 MG: 325 TABLET, FILM COATED ORAL at 06:11

## 2022-11-15 RX ADMIN — INSULIN LISPRO 2 UNITS: 100 INJECTION, SOLUTION INTRAVENOUS; SUBCUTANEOUS at 10:03

## 2022-11-15 RX ADMIN — Medication 10 ML: at 10:04

## 2022-11-15 NOTE — PAYOR COMM NOTE
"Kacie Aiken RN  Utilization Management  P:[phone]  F:[phone]    ID # [id number]  Keegan Daigle (47 y.o. Female)     Date of Birth   1975    Social Security Number       Address   [address] [place] [address]    Home Phone   [phone]    MRN   [id number]       Lutheran   None    Marital Status   Single                            Admission Date   11/14/22    Admission Type   Emergency    Admitting Provider   Regan Hill MD    Attending Provider   Regan Hill MD    Department, Room/Bed   [facility] 2B ICU, N230/1       Discharge Date       Discharge Disposition       Discharge Destination                               Attending Provider: Regan Hill MD    Allergies: Metformin    Isolation: None   Infection: None   Code Status: Prior    Ht: 160 cm (63\")   Wt: 82.3 kg (181 lb 7 oz)    Admission Cmt: None   Principal Problem: Thalamic stroke (HCC) [I63.81]                 Active Insurance as of 11/14/2022     Primary Coverage     Payor Plan Insurance Group Employer/Plan Group    [employer] [id number]     Payor Plan Address Payor Plan Phone Number Payor Plan Fax Number Effective Dates    [address] [address] [phone]  1/6/2020 - None Entered    [facility] [id number]       Subscriber Name Subscriber Birth Date Member ID       KEEGAN DAIGLE 1975 [id number]                 Emergency Contacts      (Rel.) Home Phone Work Phone Mobile Phone    RASHARDBARRERA (Significant Other) [phone] -- --    Franki Torre (Son) -- -- [phone]               History & Physical      Regan Hill MD at 11/14/22 2322          Pulmonary/Critical Care History and Physical Exam     LOS: 1 day   Patient Care Team:  Jerel Agudelo MD as PCP - General (Family Medicine)  Yuki Hughes DO as Consulting Physician (Internal Medicine)    Chief Complaint:    Chief Complaint   Patient presents with   • Chest Pain "       Subjective      HPI:     47 y.o. female with history of poorly controlled type 2 diabetes (hemoglobin A1c 9.9% on 10/22/2022), hypertension, hyperlipidemia, CVA in October 2022, who was brought in with complaint of confusion, headache, and chest pain.  This apparently was noticed by her boyfriend of 10 years.  On arrival, she had a fever of 100.4 and has a significantly elevated white blood cell count to 28,000.  Patient is currently fairly confused and drowsy and difficult to obtain a history from.  It does appear she was given Haldol since arrival here as she was having some agitation.  She was also given 1 mg of Ativan.    Patient does not think that she has had fevers.  She is a former smoker who quit 3 weeks ago.  Her boyfriend reports that she has been confused since her stroke.  Patient denies alcohol or illicit drug use.    Patient was given Rocephin in the emergency department.    History taken from: Patient, patient's boyfriend.    Past Medical History:   Diagnosis Date   • CVA (cerebral vascular accident) (HCC) 10/22/2022   • Hyperlipidemia    • Hypertension    • Type 2 diabetes mellitus (HCC)        History reviewed. No pertinent surgical history.    Family History   Problem Relation Age of Onset   • Diabetes Mother    • Hypertension Mother    • Heart attack Mother    • Diabetes Father    • Hypertension Father        Social History     Socioeconomic History   • Marital status: Single   Tobacco Use   • Smoking status: Every Day     Packs/day: 0.50     Years: 15.00     Pack years: 7.50     Types: Cigarettes   • Smokeless tobacco: Never   Vaping Use   • Vaping Use: Never used   Substance and Sexual Activity   • Alcohol use: Yes     Comment: social   • Drug use: Never   • Sexual activity: Defer       Allergies   Allergen Reactions   • Metformin GI Intolerance       Past Medical History/Family History/Social History were reviewed by me and updates were made.     Review of Systems:    Review of 14  systems was completed with positives and pertinent negatives noted in the subjective section.  All other systems reviewed and are negative.         Objective      Vital Signs  Temp:  [100.4 °F (38 °C)] 100.4 °F (38 °C)  Heart Rate:  [] 98  Resp:  [16-22] 16  BP: ()/(51-97) 115/64  Body mass index is 30.11 kg/m².     No intake or output data in the 24 hours ending 11/15/22 0124   Physical Exam:     General Appearance:   Drowsy, intermittently confused, in no acute distress   Head:    Normocephalic, without obvious abnormality, atraumatic   Eyes:            Lids and lashes normal, conjunctivae and sclerae normal,       ENMT:   Ears appear intact with no abnormalities noted.    Oral mucosa moist.   Neck:   No adenopathy, supple, trachea midline, no thyromegaly,      no carotid bruit, no JVD   Lungs/resp:     Normal effort, symmetric chest rise, no crepitus, clear to      auscultation bilaterally, no chest wall tenderness                  Heart/CV:    Regular rhythm and normal rate, normal S1 and S2, no         murmur   Abdomen/GI:     Normal bowel sounds, no masses, no organomegaly, soft,    nontender, nondistended   G/U:     Deferred   Extremities/MSK:   No clubbing or cyanosis.  1+ bilateral lower extremity  edema.  Normal tone.  No deformities.    Pulses:   Pulses palpable and equal bilaterally   Skin:   No bleeding, bruising or rash.  Patient does appear to have track marks on her arms bilaterally.   Hem/Lymph:   No cervical or supraclavicular adenopathy.    Neurologic:   Moves all extremities with no obvious focal motor deficit.              Psychiatric:  Normal mood and affect, oriented x 3.     The above findings are documentation of my personal physical exam findings from today.   Electronically signed by:  Regan iHll MD  11/15/22  01:24 EST     Interval: baseline  1a. Level of Consciousness: 0-->Alert, keenly responsive  1b. LOC Questions: 1-->Answers one question correctly  1c. LOC  Commands: 0-->Performs both tasks correctly  2. Best Gaze: 0-->Normal  3. Visual: 0-->No visual loss  4. Facial Palsy: 0-->Normal symmetrical movements  5a. Motor Arm, Left: 0-->No drift, limb holds 90 (or 45) degrees for full 10 secs  5b. Motor Arm, Right: 0-->No drift, limb holds 90 (or 45) degrees for full 10 secs  6a. Motor Leg, Left: 1-->Drift, leg falls by the end of the 5-sec period but does not hit bed  6b. Motor Leg, Right: 1-->Drift, leg falls by the end of the 5-sec period but does not hit bed  7. Limb Ataxia: 0-->Absent  8. Sensory: 1-->Mild-to-moderate sensory loss, patient feels pinprick is less sharp or is dull on the affected side, or there is a loss of superficial pain with pinprick, but patient is aware of being touched  9. Best Language: 0-->No aphasia, normal  10. Dysarthria: 0-->Normal  11. Extinction and Inattention (formerly Neglect): 0-->No abnormality    Total (NIH Stroke Scale): 4     Results Review:     I reviewed the patient's new clinical results.   Results from last 7 days   Lab Units 11/14/22  2104   SODIUM mmol/L 137   POTASSIUM mmol/L 4.0   CHLORIDE mmol/L 100   CO2 mmol/L 25.0   BUN mg/dL 10   CREATININE mg/dL 0.74   CALCIUM mg/dL 9.7   BILIRUBIN mg/dL 0.9   ALK PHOS U/L 144*   ALT (SGPT) U/L 25   AST (SGOT) U/L 22   GLUCOSE mg/dL 167*     Results from last 7 days   Lab Units 11/14/22  2104   WBC 10*3/mm3 28.67*   HEMOGLOBIN g/dL 15.2   HEMATOCRIT % 45.9   PLATELETS 10*3/mm3 285   Troponin was within normal limits.        I reviewed the patient's new imaging including images and reports.    CT head 11/14/2022:  IMPRESSION:  1.  8 mm focus of hyperdensity in the left thalamus in the area of  patient's prior infarct, which could represent a new small focus of  hemorrhage within the area of infarct. Follow-up is recommended.  2.  No other definite new intracranial abnormality is seen.  3.  Hypodensities in the left posterior corpus callosum, consistent with  infarct seen on prior  imaging.    EKG shows sinus tachycardia with no other significant abnormality.    Medication Review:   haloperidol lactate, 10 mg, Intramuscular, Once  insulin detemir, 20 Units, Subcutaneous, Daily  insulin lispro, 0-9 Units, Subcutaneous, 4x Daily With Meals & Nightly  Insulin Lispro, 2 Units, Subcutaneous, TID With Meals  LORazepam, 0.5 mg, Intravenous, Once  sodium chloride, 10 mL, Intravenous, Q12H      niCARdipine, 5-15 mg/hr, Last Rate: Stopped (11/14/22 2321)        Assessment & Plan     Active Hospital Problems    Diagnosis    • **Thalamic stroke (HCC)    • Left-sided nontraumatic intracerebral hemorrhage of brainstem (HCC)    • Type 2 diabetes mellitus with other specified complication (HCC)    • Tobacco abuse    • Essential hypertension      47 y.o. female with history of poorly controlled type 2 diabetes (hemoglobin A1c 9.9% on 10/22/2022), hypertension, hyperlipidemia, CVA in October 2022, who was brought in with complaint of confusion, headache, and chest pain.  This apparently was noticed by her boyfriend of 10 years.  On arrival, she had a fever of 100.4 and has a significantly elevated white blood cell count to 28,000.  Patient is currently fairly confused and drowsy and difficult to obtain a history from.  It does appear she was given Haldol since arrival here as she was having some agitation.  She was also given 1 mg of Ativan.    Patient does not think that she has had fevers.  She is a former smoker who quit 3 weeks ago.  Her boyfriend reports that she has been confused since her stroke.  Patient denies alcohol or illicit drug use.    Patient was given Rocephin in the emergency department.    CT scan of the head shows an area of possible hemorrhage in the area previous thalamic infarct.  Patient has been seen by the stroke service who has ordered an MRI and recommended ICU admission.    Plan:  1.  For possible ICH/recent thalamic stroke/headache: Admit to ICU.  Statin.  MRI brain per neurology.   Case was discussed with neurosurgery and patient felt to require surgery currently.  2.  For hypertension: Cardene drip as needed for goal SBP < 140.  Resume home amlodipine.  3.  For diabetes: Levemir 20 mg daily for basal with 2 mg lispro for prandial coverage and moderate correction scale.  4.  For tobacco abuse: She reports she quit a few weeks ago.  Counseled on ongoing smoking cessation efforts.  5.  For fever/leukocytosis: COVID and respiratory panel were negative.  Follow-up cultures, urinalysis/culture.  Check procalcitonin.  Patient was given Rocephin in the emergency department.  Hold on additional antibiotics until additional information is obtained.  6.  DVT prophylaxis: Mechanical.    Patient is critically ill secondary to ICH and has high risk of life-threatening decline in condition.  As such, she requires continuous monitoring frequent reassessment for consideration of adjustment management to minimize this risk.  Personally reassessed her multiple times today.    Critical care time : 42 minutes spent by me personally.(This excludes time spent performing separately reportable procedures and services). Critical care time includes high complexity decision making to assess, manipulate, and support vital organ system failure in this individual who has impairment of one or more vital organ systems such that there is a high probability of imminent or life threatening deterioration in the patient’s condition.    Electronically signed by:  Regan Hill MD  11/15/22  01:24 EST      Please note that portions of this note were completed with SPO - a voice recognition program.       Electronically signed by Regan Hill MD at 11/15/22 0134          Emergency Department Notes      Josiah Landin MD at 22           EMERGENCY DEPARTMENT ENCOUNTER    Pt Name: Rachana Patrick  MRN: [id number]  Pt :   1975  Room Number:  N230/1  Date of encounter:  2022  PCP:  Jerel Agudelo MD  ED Provider: Josiah Landin MD    Historian: Patient and chart review      HPI:  Chief Complaint: chest pain        Context: Rachana Patrick is a 47 y.o. female who presents to the ED c/o 47-year-old female, with a history of diabetes and stroke, here with initial chief complaint of chest pain, also complaining of headache nausea which she tells me has been present for 4 days.  When asked about symptoms relating to illness or fever, she denies cough congestion dysuria or abdominal pain.  Does note a headache and says she has had this for 4 days.  The patient was discharged on , for a thalamic stroke, and admitted here last week for observation with a headache.      PAST MEDICAL HISTORY  Past Medical History:   Diagnosis Date   • CVA (cerebral vascular accident) (HCC) 10/22/2022   • Hyperlipidemia    • Hypertension    • Type 2 diabetes mellitus (HCC)          PAST SURGICAL HISTORY  History reviewed. No pertinent surgical history.      FAMILY HISTORY  Family History   Problem Relation Age of Onset   • Diabetes Mother    • Hypertension Mother    • Heart attack Mother    • Diabetes Father    • Hypertension Father          SOCIAL HISTORY  Social History     Socioeconomic History   • Marital status: Single   Tobacco Use   • Smoking status: Former     Packs/day: 0.50     Years: 15.00     Pack years: 7.50     Types: Cigarettes     Quit date: 10/24/2022     Years since quittin.0   • Smokeless tobacco: Never   Vaping Use   • Vaping Use: Never used   Substance and Sexual Activity   • Alcohol use: Yes     Comment: social   • Drug use: Never   • Sexual activity: Defer         ALLERGIES  Metformin        REVIEW OF SYSTEMS  Review of Systems         HENT: Negative for sneezing and sore throat.    Respiratory: Negative for cough. Negative for shortness of breath.    Genitourinary: Negative.  Negative for difficulty urinating.     All systems reviewwed and negative except as noted in  HPI.  All systems reviewed and negative except for those discussed in HPI.       PHYSICAL EXAM    I have reviewed the triage vital signs and nursing notes.    ED Triage Vitals [11/14/22 2034]   Temp Heart Rate Resp BP SpO2   100.4 °F (38 °C) 100 22 126/90 98 %      Temp src Heart Rate Source Patient Position BP Location FiO2 (%)   Oral Monitor Lying Left arm --       Physical Exam  GENERAL:   Appears alert to person interactive, slightly agitated  HENT: Nares patent.  EYES: No scleral icterus.  CV: Regular rhythm, regular rate.  RESPIRATORY: Normal effort.  No audible wheezes, rales or rhonchi.  ABDOMEN: Soft, nontender  MUSCULOSKELETAL: No deformities.   NEURO: Does not have focal deficits, answers questions with some tangential and some straightforward answers, does have some agitation.  SKIN: Warm, dry, no rash visualized.        LAB RESULTS  Recent Results (from the past 24 hour(s))   ECG 12 Lead ED Triage Standing Order; Chest Pain    Collection Time: 11/14/22  8:37 PM   Result Value Ref Range    QT Interval 334 ms    QTC Interval 433 ms   Troponin    Collection Time: 11/14/22  9:04 PM    Specimen: Blood   Result Value Ref Range    Troponin T <0.010 0.000 - 0.030 ng/mL   Comprehensive Metabolic Panel    Collection Time: 11/14/22  9:04 PM    Specimen: Blood   Result Value Ref Range    Glucose 167 (H) 65 - 99 mg/dL    BUN 10 6 - 20 mg/dL    Creatinine 0.74 0.57 - 1.00 mg/dL    Sodium 137 136 - 145 mmol/L    Potassium 4.0 3.5 - 5.2 mmol/L    Chloride 100 98 - 107 mmol/L    CO2 25.0 22.0 - 29.0 mmol/L    Calcium 9.7 8.6 - 10.5 mg/dL    Total Protein 8.3 6.0 - 8.5 g/dL    Albumin 4.40 3.50 - 5.20 g/dL    ALT (SGPT) 25 1 - 33 U/L    AST (SGOT) 22 1 - 32 U/L    Alkaline Phosphatase 144 (H) 39 - 117 U/L    Total Bilirubin 0.9 0.0 - 1.2 mg/dL    Globulin 3.9 gm/dL    A/G Ratio 1.1 g/dL    BUN/Creatinine Ratio 13.5 7.0 - 25.0    Anion Gap 12.0 5.0 - 15.0 mmol/L    eGFR 99.3 >60.0 mL/min/1.73   Lipase    Collection Time:  11/14/22  9:04 PM    Specimen: Blood   Result Value Ref Range    Lipase 15 13 - 60 U/L   BNP    Collection Time: 11/14/22  9:04 PM    Specimen: Blood   Result Value Ref Range    proBNP 395.2 0.0 - 450.0 pg/mL   Green Top (Gel)    Collection Time: 11/14/22  9:04 PM   Result Value Ref Range    Extra Tube Hold for add-ons.    Lavender Top    Collection Time: 11/14/22  9:04 PM   Result Value Ref Range    Extra Tube hold for add-on    Gold Top - SST    Collection Time: 11/14/22  9:04 PM   Result Value Ref Range    Extra Tube Hold for add-ons.    Gray Top    Collection Time: 11/14/22  9:04 PM   Result Value Ref Range    Extra Tube Hold for add-ons.    Light Blue Top    Collection Time: 11/14/22  9:04 PM   Result Value Ref Range    Extra Tube Hold for add-ons.    CBC Auto Differential    Collection Time: 11/14/22  9:04 PM    Specimen: Blood   Result Value Ref Range    WBC 28.67 (H) 3.40 - 10.80 10*3/mm3    RBC 5.09 3.77 - 5.28 10*6/mm3    Hemoglobin 15.2 12.0 - 15.9 g/dL    Hematocrit 45.9 34.0 - 46.6 %    MCV 90.2 79.0 - 97.0 fL    MCH 29.9 26.6 - 33.0 pg    MCHC 33.1 31.5 - 35.7 g/dL    RDW 12.5 12.3 - 15.4 %    RDW-SD 41.3 37.0 - 54.0 fl    MPV 10.7 6.0 - 12.0 fL    Platelets 285 140 - 450 10*3/mm3    Neutrophil % 82.8 (H) 42.7 - 76.0 %    Lymphocyte % 6.4 (L) 19.6 - 45.3 %    Monocyte % 9.0 5.0 - 12.0 %    Eosinophil % 0.7 0.3 - 6.2 %    Basophil % 0.4 0.0 - 1.5 %    Immature Grans % 0.7 (H) 0.0 - 0.5 %    Neutrophils, Absolute 23.77 (H) 1.70 - 7.00 10*3/mm3    Lymphocytes, Absolute 1.83 0.70 - 3.10 10*3/mm3    Monocytes, Absolute 2.57 (H) 0.10 - 0.90 10*3/mm3    Eosinophils, Absolute 0.19 0.00 - 0.40 10*3/mm3    Basophils, Absolute 0.11 0.00 - 0.20 10*3/mm3    Immature Grans, Absolute 0.20 (H) 0.00 - 0.05 10*3/mm3    nRBC 0.0 0.0 - 0.2 /100 WBC   Lactic Acid, Plasma    Collection Time: 11/14/22  9:04 PM    Specimen: Blood   Result Value Ref Range    Lactate 1.5 0.5 - 2.0 mmol/L   Ethanol    Collection Time: 11/14/22   9:04 PM    Specimen: Blood   Result Value Ref Range    Ethanol <10 0 - 10 mg/dL   Protime-INR    Collection Time: 11/14/22  9:04 PM    Specimen: Blood   Result Value Ref Range    Protime 14.1 11.4 - 14.4 Seconds    INR 1.09 0.84 - 1.13   aPTT    Collection Time: 11/14/22  9:04 PM    Specimen: Blood   Result Value Ref Range    PTT 33.6 22.0 - 39.0 seconds   CK    Collection Time: 11/14/22  9:04 PM    Specimen: Blood   Result Value Ref Range    Creatine Kinase 50 20 - 180 U/L   Procalcitonin    Collection Time: 11/14/22  9:04 PM    Specimen: Blood   Result Value Ref Range    Procalcitonin 0.06 0.00 - 0.25 ng/mL   Respiratory Panel PCR w/COVID-19(SARS-CoV-2) LATONYA/SERAFIN/MANDO/PAD/COR/MAD/RALPH In-House, NP Swab in UTM/VTM, 3-4 HR TAT - Swab, Nasopharynx    Collection Time: 11/14/22 11:12 PM    Specimen: Nasopharynx; Swab   Result Value Ref Range    ADENOVIRUS, PCR Not Detected Not Detected    Coronavirus 229E Not Detected Not Detected    Coronavirus HKU1 Not Detected Not Detected    Coronavirus NL63 Not Detected Not Detected    Coronavirus OC43 Not Detected Not Detected    COVID19 Not Detected Not Detected - Ref. Range    Human Metapneumovirus Not Detected Not Detected    Human Rhinovirus/Enterovirus Not Detected Not Detected    Influenza A PCR Not Detected Not Detected    Influenza B PCR Not Detected Not Detected    Parainfluenza Virus 1 Not Detected Not Detected    Parainfluenza Virus 2 Not Detected Not Detected    Parainfluenza Virus 3 Not Detected Not Detected    Parainfluenza Virus 4 Not Detected Not Detected    RSV, PCR Not Detected Not Detected    Bordetella pertussis pcr Not Detected Not Detected    Bordetella parapertussis PCR Not Detected Not Detected    Chlamydophila pneumoniae PCR Not Detected Not Detected    Mycoplasma pneumo by PCR Not Detected Not Detected   Urine Drug Screen - Urine, Clean Catch    Collection Time: 11/14/22 11:23 PM    Specimen: Urine, Clean Catch   Result Value Ref Range    THC, Screen, Urine  Positive (A) Negative    Phencyclidine (PCP), Urine Negative Negative    Cocaine Screen, Urine Negative Negative    Methamphetamine, Ur Negative Negative    Opiate Screen Negative Negative    Amphetamine Screen, Urine Negative Negative    Benzodiazepine Screen, Urine Negative Negative    Tricyclic Antidepressants Screen Negative Negative    Methadone Screen, Urine Negative Negative    Barbiturates Screen, Urine Negative Negative    Oxycodone Screen, Urine Negative Negative    Propoxyphene Screen Negative Negative    Buprenorphine, Screen, Urine Negative Negative   Urinalysis With Culture If Indicated - Urine, Clean Catch    Collection Time: 11/14/22 11:23 PM    Specimen: Urine, Clean Catch   Result Value Ref Range    Color, UA Yellow Yellow, Straw    Appearance, UA Cloudy (A) Clear    pH, UA <=5.0 5.0 - 8.0    Specific Gravity, UA 1.043 (H) 1.001 - 1.030    Glucose, UA >=1000 mg/dL (3+) (A) Negative    Ketones, UA 15 mg/dL (1+) (A) Negative    Bilirubin, UA Negative Negative    Blood, UA Negative Negative    Protein, UA Negative Negative    Leuk Esterase, UA Trace (A) Negative    Nitrite, UA Negative Negative    Urobilinogen, UA 0.2 E.U./dL 0.2 - 1.0 E.U./dL   Urinalysis, Microscopic Only - Urine, Clean Catch    Collection Time: 11/14/22 11:23 PM    Specimen: Urine, Clean Catch   Result Value Ref Range    RBC, UA 0-2 None Seen, 0-2 /HPF    WBC, UA 13-20 (A) None Seen, 0-2 /HPF    Bacteria, UA 1+ (A) None Seen, Trace /HPF    Squamous Epithelial Cells, UA 21-30 (A) None Seen, 0-2 /HPF    Yeast, UA Small/1+ Budding Yeast None Seen /HPF    Methodology Manual Light Microscopy    Hemoglobin A1c    Collection Time: 11/15/22  3:06 AM    Specimen: Blood   Result Value Ref Range    Hemoglobin A1C 9.50 (H) 4.80 - 5.60 %   Lipid Panel    Collection Time: 11/15/22  3:06 AM    Specimen: Blood   Result Value Ref Range    Total Cholesterol 92 0 - 200 mg/dL    Triglycerides 103 0 - 150 mg/dL    HDL Cholesterol 29 (L) 40 - 60 mg/dL     LDL Cholesterol  43 0 - 100 mg/dL    VLDL Cholesterol 20 5 - 40 mg/dL    LDL/HDL Ratio 1.46    CBC (No Diff)    Collection Time: 11/15/22  3:06 AM    Specimen: Blood   Result Value Ref Range    WBC 20.76 (H) 3.40 - 10.80 10*3/mm3    RBC 4.43 3.77 - 5.28 10*6/mm3    Hemoglobin 13.2 12.0 - 15.9 g/dL    Hematocrit 39.6 34.0 - 46.6 %    MCV 89.4 79.0 - 97.0 fL    MCH 29.8 26.6 - 33.0 pg    MCHC 33.3 31.5 - 35.7 g/dL    RDW 12.5 12.3 - 15.4 %    RDW-SD 41.7 37.0 - 54.0 fl    MPV 10.7 6.0 - 12.0 fL    Platelets 234 140 - 450 10*3/mm3   Basic Metabolic Panel    Collection Time: 11/15/22  3:06 AM    Specimen: Blood   Result Value Ref Range    Glucose 238 (H) 65 - 99 mg/dL    BUN 13 6 - 20 mg/dL    Creatinine 0.86 0.57 - 1.00 mg/dL    Sodium 133 (L) 136 - 145 mmol/L    Potassium 3.9 3.5 - 5.2 mmol/L    Chloride 99 98 - 107 mmol/L    CO2 24.0 22.0 - 29.0 mmol/L    Calcium 8.5 (L) 8.6 - 10.5 mg/dL    BUN/Creatinine Ratio 15.1 7.0 - 25.0    Anion Gap 10.0 5.0 - 15.0 mmol/L    eGFR 82.9 >60.0 mL/min/1.73   Magnesium    Collection Time: 11/15/22  3:06 AM    Specimen: Blood   Result Value Ref Range    Magnesium 2.0 1.6 - 2.6 mg/dL   Phosphorus    Collection Time: 11/15/22  3:06 AM    Specimen: Blood   Result Value Ref Range    Phosphorus 2.6 2.5 - 4.5 mg/dL   POC Glucose Once    Collection Time: 11/15/22  6:21 AM    Specimen: Blood   Result Value Ref Range    Glucose 175 (H) 70 - 130 mg/dL       If labs were ordered, I independently reviewed the results.        RADIOLOGY  CT Head Without Contrast    Result Date: 11/15/2022  CT HEAD WO CONTRAST Date of Exam: 11/15/2022 4:37 EST Indication: Stroke, follow up Comparison: November 14, 2022. October 31, 2022. MRI November 1, 2022. Technique: CT of the head was obtained from the skull base to vertex without intravenous contrast.  Coronal reconstructions were performed.  Automated exposure control and iterative reconstruction methods were used. FINDINGS No midline shift. Ventricles  and sulci appear within normal limits and unchanged. Basal cisterns appear patent. The subtle area of hyperattenuation seen in the left thalamus on the prior exam appears slightly less conspicuous, but there does appear to be a persistent focus of increased attenuation relative to the prior head CT in this location. There is persistent hypodensity in the left posterior corpus callosum consistent with prior infarct. No definitive evidence of new hemorrhage or extra-axial collection. No definite new mass lesion no definitive evidence of an acute infarction. Paranasal sinuses and mastoid air cells appear clear. No acute calvarial abnormality is seen.     1.Subtle area of hyperattenuation in the left thalamus, slightly less conspicuous than on the prior exam. This represents a change compared to prior studies and again could be related to acute hemorrhage within an area of prior infarct. 2.Hypodensities in the posterior left corpus callosum, as before, consistent with prior infarct. 3.No definite new intracranial abnormality is seen. Electronically Signed: Krzysztof Pineda MD 11/15/2022 6:04 EST Workstation ID: [id number]    CT Head Without Contrast    Result Date: 11/14/2022   DATE OF EXAM: 11/14/2022 9:51 PM  PROCEDURE: CT HEAD WO CONTRAST-  INDICATIONS: headache  COMPARISON: 10/31/2022, MRI 11/01/2022.  TECHNIQUE: Routine transaxial cuts were obtained through the head without the administration of contrast. Automated exposure control and iterative reconstruction methods were used.  FINDINGS: No midline shift. Basal cisterns appear patent.  Ventricles and sulci appear within normal limits for patient age.  No extra-axial collection is identified.  There are hypodensities in the left posterior corpus callosum, corresponding with the area of infarct seen on the prior exam. In the left thalamus, there is a small focus of hyperdensity seen on axial image 27 of series 3 measuring approximate 8 mm, which is in the location of  infarct seen on prior imaging. This hyperdensity was not seen on prior imaging and could represent a small focus of hemorrhage within the area of prior infarction.  No other definite hemorrhage is seen. No other definite edema or findings of acute infarction. No mass lesion is seen.  Paranasal sinuses appear clear.  Mastoid air cells appear clear.  No acute calvarial abnormality is identified.      1.  8 mm focus of hyperdensity in the left thalamus in the area of patient's prior infarct, which could represent a new small focus of hemorrhage within the area of infarct. Follow-up is recommended. 2.  No other definite new intracranial abnormality is seen. 3.  Hypodensities in the left posterior corpus callosum, consistent with infarct seen on prior imaging.  Findings were called to the ordering provider by Dr. Pineda 11/14/2022 10:13 PM  This report was finalized on 11/14/2022 10:13 PM by Krzysztof Pineda MD.      XR Chest 1 View    Result Date: 11/14/2022  DATE OF EXAM: 11/14/2022 9:12 PM  PROCEDURE: XR CHEST 1 VW-  INDICATIONS: Chest Pain Triage Protocol  COMPARISON: No Comparisons Available  TECHNIQUE: Single radiographic view of the chest was obtained.  FINDINGS: No focal or diffuse pulmonary infiltrate is identified. No pleural effusion or pneumothorax is seen.  Heart size and mediastinal contours appear within normal limits.  No acute osseous abnormality is identified on this limited single view.      No radiographic findings of acute cardiopulmonary abnormality.  This report was finalized on 11/14/2022 9:37 PM by Krzysztof Pineda MD.          PROCEDURES    Procedures    ECG 12 Lead ED Triage Standing Order; Chest Pain   Final Result   Test Reason : CP   Blood Pressure :   */*   mmHG   Vent. Rate : 101 BPM     Atrial Rate : 101 BPM      P-R Int : 180 ms          QRS Dur :  78 ms       QT Int : 334 ms       P-R-T Axes :  66  58  46 degrees      QTc Int : 433 ms      Sinus tachycardia   Anterior infarct , age undetermined    Abnormal ECG   When compared with ECG of 31-OCT-2022 07:48,   No significant change was found   Confirmed by JOSIAH TRAN ([id number]) on 11/14/2022 10:19:57 PM      Referred By: ED MD           Confirmed By: JOSIAH TRAN              PROGRESS, DATA ANALYSIS, CONSULTS, AND MEDICAL DECISION MAKING        Differential diagnoses: Encephalopathy, stroke, intracranial hemorrhage    Patient has significant leukocytosis, clinically find that this may be reactive, and related to her stroke, and evidence of small thalamic bleed.    ED Course as of 11/15/22 0704   Mon Nov 14, 2022   2222 Spoke with Neida on the Stroke Team [TA]   2222 I requested Im sedation and IV/monitor to reassess BP from ED Staff [TA]   2230 I did speak to LUCINA Suarez, he recommended Neurology and Medical Management. [TA]   2324 Patient presents with headache for 4 days, and slight confusion, think the clinical history is suggestive of the thalami bleed from her prior thalamic stroke.  She does also have a low-grade temperature but has no cough, chest x-ray is clear, at this time urinalysis and viral testing are pending.  Regarding sepsis care, IV fluid boluses are contraindicated is watching the patient's blood pressure closely with the thalamic bleed, I have ordered Rocephin IV for coverage after blood cultures. [TA]      ED Course User Index  [TA] Josiah Tran MD     UA has resulted later in the evening, with nonspecific findings.        AS OF 07:04 EST VITALS:    BP - 105/76  HR - 96  TEMP - 99.6 °F (37.6 °C) (Axillary)  O2 SATS - 94%                  DIAGNOSIS  Final diagnoses:   Thalamic stroke (HCC)   Cerebrovascular accident (CVA), unspecified mechanism (HCC)   Altered mental status, unspecified altered mental status type   Leukocytosis, unspecified type         DISPOSITION  Admit           Josiah Tran MD  11/15/22 0704      Electronically signed by Josiah Tran MD at 11/15/22 0704         Current  Facility-Administered Medications   Medication Dose Route Frequency Provider Last Rate Last Admin   • acetaminophen (TYLENOL) tablet 650 mg  650 mg Oral Q6H PRN Ruben Wellington APRN   650 mg at 11/15/22 0611   • amLODIPine (NORVASC) tablet 10 mg  10 mg Oral Q24H Regan Hill MD       • cefTRIAXone (ROCEPHIN) 1 g/100 mL 0.9% NS (MBP)  1 g Intravenous Q24H Regan Hill MD       • dextrose (D50W) (25 g/50 mL) IV injection 25 g  25 g Intravenous Q15 Min PRN Regan Hill MD       • dextrose (GLUTOSE) oral gel 15 g  15 g Oral Q15 Min PRN Regan Hill MD       • glucagon (human recombinant) (GLUCAGEN DIAGNOSTIC) injection 1 mg  1 mg Intramuscular Q15 Min PRN Regan Hill MD       • haloperidol lactate (HALDOL) injection 10 mg  10 mg Intramuscular Once Josiah Landin MD       • insulin detemir (LEVEMIR) injection 20 Units  20 Units Subcutaneous Daily Regan Hill MD       • Insulin Lispro (humaLOG) injection 0-9 Units  0-9 Units Subcutaneous 4x Daily With Meals & Nightly Regan Hill MD       • Insulin Lispro (humaLOG) injection 2 Units  2 Units Subcutaneous TID With Meals Regan Hill MD       • LORazepam (ATIVAN) injection 0.5 mg  0.5 mg Intravenous Once Josiah Landin MD       • niCARdipine (CARDENE) 25mg in 250mL NS infusion  5-15 mg/hr Intravenous Titrated Neida Garcia APRN   Held at 11/14/22 2321   • sodium chloride 0.9 % flush 10 mL  10 mL Intravenous Q12H Neida Garcia APRN       • sodium chloride 0.9 % flush 10 mL  10 mL Intravenous PRN Neida Garcia APRN           Lab Results (last 24 hours)     Procedure Component Value Units Date/Time    C-reactive Protein [[id number]]  (Abnormal) Collected: 11/15/22 0306    Specimen: Blood Updated: 11/15/22 0849     C-Reactive Protein 4.79 mg/dL     Sedimentation Rate [[id number]]  (Abnormal) Collected: 11/15/22 0306    Specimen: Blood Updated: 11/15/22 0844     Sed Rate 31 mm/hr     POC Glucose Once  [[id number]]  (Abnormal) Collected: 11/15/22 0621    Specimen: Blood Updated: 11/15/22 0623     Glucose 175 mg/dL      Comment: Meter: [id number] : [id number] Sukhjinder Bridges       Hemoglobin A1c [[id number]]  (Abnormal) Collected: 11/15/22 0306    Specimen: Blood Updated: 11/15/22 0403     Hemoglobin A1C 9.50 %     Narrative:      Hemoglobin A1C Ranges:    Increased Risk for Diabetes  5.7% to 6.4%  Diabetes                     >= 6.5%  Diabetic Goal                < 7.0%    Basic Metabolic Panel [[id number]]  (Abnormal) Collected: 11/15/22 0306    Specimen: Blood Updated: 11/15/22 0342     Glucose 238 mg/dL      BUN 13 mg/dL      Creatinine 0.86 mg/dL      Sodium 133 mmol/L      Potassium 3.9 mmol/L      Comment: Slight hemolysis detected by analyzer. Results may be affected.        Chloride 99 mmol/L      CO2 24.0 mmol/L      Calcium 8.5 mg/dL      BUN/Creatinine Ratio 15.1     Anion Gap 10.0 mmol/L      eGFR 82.9 mL/min/1.73      Comment: National Kidney Foundation and American Society of Nephrology (ASN) Task Force recommended calculation based on the Chronic Kidney Disease Epidemiology Collaboration (CKD-EPI) equation refit without adjustment for race.       Narrative:      GFR Normal >60  Chronic Kidney Disease <60  Kidney Failure <15      Lipid Panel [[id number]]  (Abnormal) Collected: 11/15/22 0306    Specimen: Blood Updated: 11/15/22 0342     Total Cholesterol 92 mg/dL      Triglycerides 103 mg/dL      HDL Cholesterol 29 mg/dL      LDL Cholesterol  43 mg/dL      VLDL Cholesterol 20 mg/dL      LDL/HDL Ratio 1.46    Narrative:      Cholesterol Reference Ranges  (U.S. Department of Health and Human Services ATP III Classifications)    Desirable          <200 mg/dL  Borderline High    200-239 mg/dL  High Risk          >240 mg/dL      Triglyceride Reference Ranges  (U.S. Department of Health and Human Services ATP III Classifications)    Normal           <150 mg/dL  Borderline High  150-199 mg/dL  High              200-499 mg/dL  Very High        >500 mg/dL    HDL Reference Ranges  (U.S. Department of Health and Human Services ATP III Classifications)    Low     <40 mg/dl (major risk factor for CHD)  High    >60 mg/dl ('negative' risk factor for CHD)        LDL Reference Ranges  (U.S. Department of Health and Human Services ATP III Classifications)    Optimal          <100 mg/dL  Near Optimal     100-129 mg/dL  Borderline High  130-159 mg/dL  High             160-189 mg/dL  Very High        >189 mg/dL    Phosphorus [[id number]]  (Normal) Collected: 11/15/22 0306    Specimen: Blood Updated: 11/15/22 0342     Phosphorus 2.6 mg/dL     Magnesium [[id number]]  (Normal) Collected: 11/15/22 0306    Specimen: Blood Updated: 11/15/22 0342     Magnesium 2.0 mg/dL     CBC (No Diff) [[id number]]  (Abnormal) Collected: 11/15/22 0306    Specimen: Blood Updated: 11/15/22 0330     WBC 20.76 10*3/mm3      RBC 4.43 10*6/mm3      Hemoglobin 13.2 g/dL      Hematocrit 39.6 %      MCV 89.4 fL      MCH 29.8 pg      MCHC 33.3 g/dL      RDW 12.5 %      RDW-SD 41.7 fl      MPV 10.7 fL      Platelets 234 10*3/mm3     Urinalysis With Culture If Indicated - Urine, Clean Catch [[id number]]  (Abnormal) Collected: 11/14/22 2323    Specimen: Urine, Clean Catch Updated: 11/15/22 0204     Color, UA Yellow     Appearance, UA Cloudy     pH, UA <=5.0     Specific Gravity, UA 1.043     Glucose, UA >=1000 mg/dL (3+)     Ketones, UA 15 mg/dL (1+)     Bilirubin, UA Negative     Blood, UA Negative     Protein, UA Negative     Leuk Esterase, UA Trace     Nitrite, UA Negative     Urobilinogen, UA 0.2 E.U./dL    Narrative:      In absence of clinical symptoms, the presence of pyuria, bacteria, and/or nitrites on the urinalysis result does not correlate with infection.    Urinalysis, Microscopic Only - Urine, Clean Catch [[id number]]  (Abnormal) Collected: 11/14/22 2323    Specimen: Urine, Clean Catch Updated: 11/15/22 0204     RBC, UA 0-2 /HPF      WBC, UA 13-20 /HPF       "Bacteria, UA 1+ /HPF      Squamous Epithelial Cells, UA 21-30 /HPF      Yeast, UA Small/1+ Budding Yeast /HPF      Methodology Manual Light Microscopy    Urine Culture - Urine, Urine, Clean Catch [[id number]] Collected: 11/14/22 2323    Specimen: Urine, Clean Catch Updated: 11/15/22 0204    Procalcitonin [[id number]]  (Normal) Collected: 11/14/22 2104    Specimen: Blood Updated: 11/15/22 0130     Procalcitonin 0.06 ng/mL     Narrative:      As a Marker for Sepsis (Non-Neonates):    1. <0.5 ng/mL represents a low risk of severe sepsis and/or septic shock.  2. >2 ng/mL represents a high risk of severe sepsis and/or septic shock.    As a Marker for Lower Respiratory Tract Infections that require antibiotic therapy:    PCT on Admission    Antibiotic Therapy       6-12 Hrs later    >0.5                Strongly Recommended  >0.25 - <0.5        Recommended   0.1 - 0.25          Discouraged              Remeasure/reassess PCT  <0.1                Strongly Discouraged     Remeasure/reassess PCT    As 28 day mortality risk marker: \"Change in Procalcitonin Result\" (>80% or <=80%) if Day 0 (or Day 1) and Day 4 values are available. Refer to http://[email or web address] [email or web address]    Change in PCT <=80%  A decrease of PCT levels below or equal to 80% defines a positive change in PCT test result representing a higher risk for 28-day all-cause mortality of patients diagnosed with severe sepsis for septic shock.    Change in PCT >80%  A decrease of PCT levels of more than 80% defines a negative change in PCT result representing a lower risk for 28-day all-cause mortality of patients diagnosed with severe sepsis or septic shock.       CK [[id number]]  (Normal) Collected: 11/14/22 2104    Specimen: Blood Updated: 11/15/22 0124     Creatine Kinase 50 U/L     [place] Draw [[id number]] Collected: 11/14/22 2104    Specimen: Blood Updated: 11/15/22 0115    Narrative:      The following orders were created for panel order [place] Draw.  Procedure "                               Abnormality         Status                     ---------                               -----------         ------                     Green Top (Gel)[[id number]]                                  Final result               Lavender Top[[id number]]                                     Final result               Gold Top - SST[[id number]]                                   Final result               Gray Top[[id number]]                                         Final result               Light Blue Top[[id number]]                                   Final result                 Please view results for these tests on the individual orders.    Gray Top [[id number]] Collected: 11/14/22 2104    Specimen: Blood Updated: 11/15/22 0115     Extra Tube Hold for add-ons.     Comment: Auto resulted.       COVID PRE-OP / PRE-PROCEDURE SCREENING ORDER (NO ISOLATION) - Swab, Nasopharynx [[id number]]  (Normal) Collected: 11/14/22 2312    Specimen: Swab from Nasopharynx Updated: 11/15/22 0010    Narrative:      The following orders were created for panel order COVID PRE-OP / PRE-PROCEDURE SCREENING ORDER (NO ISOLATION) - Swab, Nasopharynx.  Procedure                               Abnormality         Status                     ---------                               -----------         ------                     Respiratory Panel PCR w/...[[id number]]  Normal              Final result                 Please view results for these tests on the individual orders.    Respiratory Panel PCR w/COVID-19(SARS-CoV-2) LATONYA/SERAFIN/MANDO/PAD/COR/MAD/RALPH In-House, NP Swab in [facility], [place], 3-4 HR TAT - Swab, Nasopharynx [[id number]]  (Normal) Collected: 11/14/22 2312    Specimen: Swab from Nasopharynx Updated: 11/15/22 0010     ADENOVIRUS, PCR Not Detected     Coronavirus 229E Not Detected     Coronavirus HKU1 Not Detected     Coronavirus NL63 Not Detected     Coronavirus OC43 Not Detected     COVID19 Not Detected     Human Metapneumovirus Not  Detected     Human Rhinovirus/Enterovirus Not Detected     Influenza A PCR Not Detected     Influenza B PCR Not Detected     Parainfluenza Virus 1 Not Detected     Parainfluenza Virus 2 Not Detected     Parainfluenza Virus 3 Not Detected     Parainfluenza Virus 4 Not Detected     RSV, PCR Not Detected     Bordetella pertussis pcr Not Detected     Bordetella parapertussis PCR Not Detected     Chlamydophila pneumoniae PCR Not Detected     Mycoplasma pneumo by PCR Not Detected    Narrative:      In the setting of a positive respiratory panel with a viral infection PLUS a negative procalcitonin without other underlying concern for bacterial infection, consider observing off antibiotics or discontinuation of antibiotics and continue supportive care. If the respiratory panel is positive for atypical bacterial infection (Bordetella pertussis, Chlamydophila pneumoniae, or Mycoplasma pneumoniae), consider antibiotic de-escalation to target atypical bacterial infection.    Urine Drug Screen - Urine, Clean Catch [[id number]]  (Abnormal) Collected: 11/14/22 2323    Specimen: Urine, Clean Catch Updated: 11/14/22 4927     THC, Screen, Urine Positive     Phencyclidine (PCP), Urine Negative     Cocaine Screen, Urine Negative     Methamphetamine, Ur Negative     Opiate Screen Negative     Amphetamine Screen, Urine Negative     Benzodiazepine Screen, Urine Negative     Tricyclic Antidepressants Screen Negative     Methadone Screen, Urine Negative     Barbiturates Screen, Urine Negative     Oxycodone Screen, Urine Negative     Propoxyphene Screen Negative     Buprenorphine, Screen, Urine Negative    Narrative:      Cutoff For Drugs Screened:    Amphetamines               500 ng/ml  Barbiturates               200 ng/ml  Benzodiazepines            150 ng/ml  Cocaine                    150 ng/ml  Methadone                  200 ng/ml  Opiates                    100 ng/ml  Phencyclidine               25 ng/ml  THC                             50 ng/ml  Methamphetamine            500 ng/ml  Tricyclic Antidepressants  300 ng/ml  Oxycodone                  100 ng/ml  Propoxyphene               300 ng/ml  Buprenorphine               10 ng/ml    The normal value for all drugs tested is negative. This report includes unconfirmed screening results, with the cutoff values listed, to be used for medical treatment purposes only.  Unconfirmed results must not be used for non-medical purposes such as employment or legal testing.  Clinical consideration should be applied to any drug of abuse test, particularly when unconfirmed results are used.      aPTT [[id number]]  (Normal) Collected: 11/14/22 2104    Specimen: Blood Updated: 11/14/22 2339     PTT 33.6 seconds     Narrative:      PTT = The equivalent PTT values for the therapeutic range of heparin levels at 0.3 to 0.5 U/ml are 60 to 70 seconds.    Protime-INR [[id number]]  (Normal) Collected: 11/14/22 2104    Specimen: Blood Updated: 11/14/22 2339     Protime 14.1 Seconds      INR 1.09    Green Top (Gel) [[id number]] Collected: 11/14/22 2104    Specimen: Blood Updated: 11/14/22 2215     Extra Tube Hold for add-ons.     Comment: Auto resulted.       Gold Top - SST [[id number]] Collected: 11/14/22 2104    Specimen: Blood Updated: 11/14/22 2215     Extra Tube Hold for add-ons.     Comment: Auto resulted.       Lavender Top [[id number]] Collected: 11/14/22 2104    Specimen: Blood Updated: 11/14/22 2215     Extra Tube hold for add-on     Comment: Auto resulted       Light Blue Top [[id number]] Collected: 11/14/22 2104    Specimen: Blood Updated: 11/14/22 2215     Extra Tube Hold for add-ons.     Comment: Auto resulted       Comprehensive Metabolic Panel [[id number]]  (Abnormal) Collected: 11/14/22 2104    Specimen: Blood Updated: 11/14/22 2140     Glucose 167 mg/dL      BUN 10 mg/dL      Creatinine 0.74 mg/dL      Sodium 137 mmol/L      Potassium 4.0 mmol/L      Comment: Slight hemolysis detected by analyzer. Results may be  affected.        Chloride 100 mmol/L      CO2 25.0 mmol/L      Calcium 9.7 mg/dL      Total Protein 8.3 g/dL      Albumin 4.40 g/dL      ALT (SGPT) 25 U/L      AST (SGOT) 22 U/L      Alkaline Phosphatase 144 U/L      Total Bilirubin 0.9 mg/dL      Globulin 3.9 gm/dL      Comment: Calculated Result        A/G Ratio 1.1 g/dL      BUN/Creatinine Ratio 13.5     Anion Gap 12.0 mmol/L      eGFR 99.3 mL/min/1.73      Comment: National Kidney Foundation and American Society of Nephrology (ASN) Task Force recommended calculation based on the Chronic Kidney Disease Epidemiology Collaboration (CKD-EPI) equation refit without adjustment for race.       Narrative:      GFR Normal >60  Chronic Kidney Disease <60  Kidney Failure <15      Troponin [[id number]]  (Normal) Collected: 11/14/22 2104    Specimen: Blood Updated: 11/14/22 2140     Troponin T <0.010 ng/mL     Narrative:      Troponin T Reference Range:  <= 0.03 ng/mL-   Negative for AMI  >0.03 ng/mL-     Abnormal for myocardial necrosis.  Clinicians would have to utilize clinical acumen, EKG, Troponin and serial changes to determine if it is an Acute Myocardial Infarction or myocardial injury due to an underlying chronic condition.       Results may be falsely decreased if patient taking Biotin.      Lipase [[id number]]  (Normal) Collected: 11/14/22 2104    Specimen: Blood Updated: 11/14/22 2140     Lipase 15 U/L     Ethanol [[id number]]  (Normal) Collected: 11/14/22 2104    Specimen: Blood Updated: 11/14/22 2140     Ethanol <10 mg/dL     Narrative:      Elevated lactic acid concentration and lactate dehydrogenase(LD) activity may falsely elevate enzymatically determined ethanol levels. Not for legal purposes.     BNP [[id number]]  (Normal) Collected: 11/14/22 2104    Specimen: Blood Updated: 11/14/22 2136     proBNP 395.2 pg/mL     Narrative:      Among patients with dyspnea, NT-proBNP is highly sensitive for the detection of acute congestive heart failure. In addition  NT-proBNP of <300 pg/ml effectively rules out acute congestive heart failure with 99% negative predictive value.    Results may be falsely decreased if patient taking Biotin.      Lactic Acid, Plasma [[id number]]  (Normal) Collected: 11/14/22 2104    Specimen: Blood Updated: 11/14/22 2131     Lactate 1.5 mmol/L      Comment: Falsely depressed results may occur on samples drawn from patients receiving N-Acetylcysteine (NAC) or Metamizole.       Blood Culture - Blood, Arm, Left [[id number]] Collected: 11/14/22 2107    Specimen: Blood from Arm, Left Updated: 11/14/22 2126    CBC & Differential [[id number]]  (Abnormal) Collected: 11/14/22 2104    Specimen: Blood Updated: 11/14/22 2124    Narrative:      The following orders were created for panel order CBC & Differential.  Procedure                               Abnormality         Status                     ---------                               -----------         ------                     CBC Auto Differential[[id number]]        Abnormal            Final result                 Please view results for these tests on the individual orders.    CBC Auto Differential [[id number]]  (Abnormal) Collected: 11/14/22 2104    Specimen: Blood Updated: 11/14/22 2124     WBC 28.67 10*3/mm3      RBC 5.09 10*6/mm3      Hemoglobin 15.2 g/dL      Hematocrit 45.9 %      MCV 90.2 fL      MCH 29.9 pg      MCHC 33.1 g/dL      RDW 12.5 %      RDW-SD 41.3 fl      MPV 10.7 fL      Platelets 285 10*3/mm3      Neutrophil % 82.8 %      Lymphocyte % 6.4 %      Monocyte % 9.0 %      Eosinophil % 0.7 %      Basophil % 0.4 %      Immature Grans % 0.7 %      Neutrophils, Absolute 23.77 10*3/mm3      Lymphocytes, Absolute 1.83 10*3/mm3      Monocytes, Absolute 2.57 10*3/mm3      Eosinophils, Absolute 0.19 10*3/mm3      Basophils, Absolute 0.11 10*3/mm3      Immature Grans, Absolute 0.20 10*3/mm3      nRBC 0.0 /100 WBC     Blood Culture - Blood, Arm, Left [[id number]] Collected: 11/14/22 2104    Specimen:  Blood from Arm, Left Updated: 11/14/22 2122        Imaging Results (Last 24 Hours)     Procedure Component Value Units Date/Time    CT Head Without Contrast [[id number]] Collected: 11/15/22 0556     Updated: 11/15/22 0606    Narrative:      CT HEAD WO CONTRAST    Date of Exam: 11/15/2022 4:37 EST    Indication: Stroke, follow up    Comparison: November 14, 2022. October 31, 2022. MRI November 1, 2022.    Technique: CT of the head was obtained from the skull base to vertex without intravenous contrast.  Coronal reconstructions were performed.  Automated exposure control and iterative reconstruction methods were used.    FINDINGS  No midline shift. Ventricles and sulci appear within normal limits and unchanged. Basal cisterns appear patent.    The subtle area of hyperattenuation seen in the left thalamus on the prior exam appears slightly less conspicuous, but there does appear to be a persistent focus of increased attenuation relative to the prior head CT in this location.    There is persistent hypodensity in the left posterior corpus callosum consistent with prior infarct. No definitive evidence of new hemorrhage or extra-axial collection. No definite new mass lesion no definitive evidence of an acute infarction.    Paranasal sinuses and mastoid air cells appear clear. No acute calvarial abnormality is seen.      Impression:      1.Subtle area of hyperattenuation in the left thalamus, slightly less conspicuous than on the prior exam. This represents a change compared to prior studies and again could be related to acute hemorrhage within an area of prior infarct.  2.Hypodensities in the posterior left corpus callosum, as before, consistent with prior infarct.  3.No definite new intracranial abnormality is seen.    Electronically Signed: Krzysztof Pineda MD 11/15/2022 6:04 EST Workstation ID: [id number]    CT Head Without Contrast [[id number]] Collected: 11/14/22 2207     Updated: 11/14/22 2216    Narrative:         DATE OF  EXAM:  11/14/2022 9:51 PM     PROCEDURE:   CT HEAD WO CONTRAST-     INDICATIONS:   headache     COMPARISON:  10/31/2022, MRI 11/01/2022.     TECHNIQUE:   Routine transaxial cuts were obtained through the head without the  administration of contrast. Automated exposure control and iterative  reconstruction methods were used.      FINDINGS:  No midline shift. Basal cisterns appear patent.  Ventricles and sulci  appear within normal limits for patient age.     No extra-axial collection is identified.  There are hypodensities in the  left posterior corpus callosum, corresponding with the area of infarct  seen on the prior exam. In the left thalamus, there is a small focus of  hyperdensity seen on axial image 27 of series 3 measuring approximate 8  mm, which is in the location of infarct seen on prior imaging. This  hyperdensity was not seen on prior imaging and could represent a small  focus of hemorrhage within the area of prior infarction.     No other definite hemorrhage is seen. No other definite edema or  findings of acute infarction. No mass lesion is seen.     Paranasal sinuses appear clear.  Mastoid air cells appear clear.  No  acute calvarial abnormality is identified.       Impression:      1.  8 mm focus of hyperdensity in the left thalamus in the area of  patient's prior infarct, which could represent a new small focus of  hemorrhage within the area of infarct. Follow-up is recommended.  2.  No other definite new intracranial abnormality is seen.  3.  Hypodensities in the left posterior corpus callosum, consistent with  infarct seen on prior imaging.     Findings were called to the ordering provider by Dr. Pineda 11/14/2022  10:13 PM     This report was finalized on 11/14/2022 10:13 PM by Krzysztof Pineda MD.       XR Chest 1 View [[id number]] Collected: 11/14/22 2137     Updated: 11/14/22 2140    Narrative:      DATE OF EXAM:  11/14/2022 9:12 PM     PROCEDURE:  XR CHEST 1 VW-     INDICATIONS:  Chest Pain Triage  Protocol     COMPARISON:  No Comparisons Available     TECHNIQUE:   Single radiographic view of the chest was obtained.     FINDINGS:  No focal or diffuse pulmonary infiltrate is identified. No pleural  effusion or pneumothorax is seen.  Heart size and mediastinal contours  appear within normal limits.  No acute osseous abnormality is identified  on this limited single view.       Impression:      No radiographic findings of acute cardiopulmonary abnormality.     This report was finalized on 2022 9:37 PM by Krzysztof Pineda MD.                        Consult Notes (last 24 hours)      Neida Garcia, APRN at 22 2230          Stroke Consult Note    Patient Name: Rachana Patrick   MRN: [id number]  Age: 47 y.o.  Sex: female  : 1975    Primary Care Physician: Jerel Agudelo MD  Referring Physician:  Josiah Landin MD    TIME STROKE TEAM CALLED:  EST     TIME PATIENT SEEN:  EST    Handedness: Right  Race:     Chief Complaint/Reason for Consultation: CT head + L thalamic ICH    HPI: Rachana Patrick is a 47 year old female with known past medical history of HTN, HLD, T2DM, obesity, Recent CVA (BHR 10/22 L thalamic CVA with some posterior extension into occipital lobe thought to be d/t small vessel disease - on DAPT, residual right eye blurred vision), and tobacco abuse who presents to [facility] initially for evaluation of chest pain, she was then also found to have a  4 day history of headache.     Of note she was recently admitted to [facility] 10/31/2022-11/3/2022 for AMS, headache, blurred vision. During this admission her MRI showed extension of previous CVA with small scattered acute lacunar infarcts of the left thalamus, left caudate body, and left splenium of the corpus callosum. She was discharged home on ASA, Plavix, and Lipitor and advised to follow up with ophthalmology d/t right eye blurriness.     Asked to evaluate patient d/t her CT head showing 8mm area of hyperdensity in  the left thalamus concerning for new small focus of hemorrhage within the infarct. Upon evaluation patient is confused (significant other at bedside states that this has been present intermittently since her previous stroke), she also has blurred vision which I am told she has had since her previous stroke. She has mild aphasia and dysarthria, her significant other reports that her speech has had fluctuations like this since her previous stroke as well. NIHSS = 4 for Aphasia, dysarthria, confusion, and mild decreased sensation right arm/leg. ICH Score = 0, GCS = 14 (E=4, M=6, V= 4) /76.    Dr. Landin reached out to Dr. Joiner who stated that patient is not a surgical candidate.      Last Known Normal Date/Time: 4 days ago EST     Review of Systems   Constitutional: Negative for fatigue and fever.   HENT: Negative for trouble swallowing.    Eyes: Positive for visual disturbance.        Blurred vision R eye, present since previous CVA   Respiratory: Negative for cough and shortness of breath.    Cardiovascular: Positive for chest pain. Negative for palpitations.   Gastrointestinal: Positive for nausea. Negative for diarrhea and vomiting.   Genitourinary: Negative for dysuria and frequency.   Neurological: Positive for headaches.   Psychiatric/Behavioral: Positive for confusion.      Past Medical History:   Diagnosis Date   • Hyperlipidemia    • Hypertension    • Type 2 diabetes mellitus (HCC)      History reviewed. No pertinent surgical history.  Family History   Problem Relation Age of Onset   • Diabetes Mother    • Hypertension Mother    • Heart attack Mother    • Diabetes Father    • Hypertension Father      Social History     Socioeconomic History   • Marital status: Single   Tobacco Use   • Smoking status: Every Day     Packs/day: 0.50     Years: 15.00     Pack years: 7.50     Types: Cigarettes   • Smokeless tobacco: Never   Vaping Use   • Vaping Use: Never used   Substance and Sexual Activity   • Alcohol  use: Yes     Comment: social   • Drug use: Never   • Sexual activity: Defer     Allergies   Allergen Reactions   • Metformin GI Intolerance     Prior to Admission medications    Medication Sig Start Date End Date Taking? Authorizing Provider   albuterol sulfate  (90 Base) MCG/ACT inhaler INHALE 2 PUFFS PO Q 6 H PRN 11/1/19   Malaika Freed MD   amLODIPine (Norvasc) 10 MG tablet Take 1 tablet by mouth Daily. 11/3/22   Lino Damian MD   aspirin 81 MG chewable tablet Chew 1 tablet Daily. 10/25/22   Bronwyn Peace APRN   atorvastatin (LIPITOR) 80 MG tablet Take 1 tablet by mouth Every Night. 10/24/22   Bronwyn Peace APRN   clopidogrel (Plavix) 75 MG tablet Take 1 tablet by mouth Daily. 10/24/22   Bronwyn Peace APRN   Continuous Blood Gluc Sensor (FreeStyle Tyson 3 Sensor) misc 1 each Every 14 (Fourteen) Days. 11/8/22   Jerel Agudelo MD   fenofibrate 160 MG tablet TK 1 T PO QD WITH A MEAL 12/11/19   Malaika Freed MD   glucose blood test strip 1 each by Other route 3 (Three) Times a Day. Use as instructed - One Touch Verio 11/8/22   Jerel Agudelo MD   Insulin Lispro (humaLOG) 100 UNIT/ML injection Inject  under the skin into the appropriate area as directed 3 (Three) Times a Day Before Meals.    Malaika Freed MD   Januvia 100 MG tablet  10/12/22   Malaika Freed MD   Jardiance 25 MG tablet tablet  10/12/22   Malaika Freed MD   Lancets (ONETOUCH DELICA PLUS [id number]) misc USE TO TEST TID AND PRN 11/1/19   Malaika Freed MD   Levemir FlexTouch 100 UNIT/ML injection Inject 40 Units under the skin into the appropriate area as directed Every Night. 11/8/22   Jerel Agudelo MD   losartan (COZAAR) 50 MG tablet Take 1 tablet by mouth Daily. Hold unless SBP > 160 10/24/22   Bronwyn Peace APRN   ONE TOUCH ULTRA TEST test strip USE TO TEST TID AND PRN 12/11/19   Malaika Freed MD   pregabalin (LYRICA) 300 MG capsule Take 1  capsule by mouth 2 (Two) Times a Day. 11/8/22   Jerel Agudelo MD         Temp:  [100.4 °F (38 °C)] 100.4 °F (38 °C)  Heart Rate:  [100] 100  Resp:  [22] 22  BP: (126)/(90) 126/90     Neurological Exam  Mental Status  Awake and alert. Oriented only to person and place. Mild dysarthria present. Expressive aphasia present.    Cranial Nerves  CN II: Able to correctly count number of fingers in all visual fields but does report blurred vision.  CN III, IV, VI: Extraocular movements intact bilaterally. Normal lids and orbits bilaterally. Pupils equal round and reactive to light bilaterally.  CN V: Facial sensation is normal.  CN VII: Full and symmetric facial movement.  CN VIII: Hearing appears intact.  CN IX, X: Palate elevates symmetrically  CN XI: Shoulder shrug strength is normal.  CN XII: Tongue midline without atrophy or fasciculations.    Motor  Normal muscle bulk throughout. No fasciculations present. Normal muscle tone. No abnormal involuntary movements.  RUE 5/5  LUE 5/5  RLE 4/5  LLE 4/5.    Sensory  Light touch abnormality: Reports mild decreased sensation right arm/leg.     Reflexes                                            Right                      Left  Plantar                           Downgoing                Downgoing    Coordination  Right: Finger-to-nose normal. Rapid alternating movement normal. Heel-to-shin normal.Left: Finger-to-nose normal. Rapid alternating movement normal. Heel-to-shin normal.    Gait    Not observed.      Physical Exam  Constitutional:       General: She is awake.      Appearance: Normal appearance.   HENT:      Head: Normocephalic and atraumatic.      Mouth/Throat:      Mouth: Mucous membranes are moist.      Pharynx: Oropharynx is clear.   Eyes:      General: Lids are normal.      Extraocular Movements: Extraocular movements intact.      Pupils: Pupils are equal, round, and reactive to light.   Cardiovascular:      Rate and Rhythm: Normal rate and regular rhythm.    Pulmonary:      Effort: Pulmonary effort is normal. No respiratory distress.      Comments: Room air  Musculoskeletal:      Right lower leg: No edema.      Left lower leg: No edema.   Skin:     General: Skin is warm and dry.   Neurological:      Mental Status: She is alert. She is disoriented.      Cranial Nerves: Dysarthria present.      Sensory: Sensory deficit present.      Motor: Weakness present.   Psychiatric:      Comments: Mood and behavior abnormal, patient is impulsive and displays mild agitation and confusion       GCS = 14  ICH score = 0    Acute Stroke Data    Thrombolytic Inclusion / Exclusion Criteria    Time: 22:30 EST  Person Administering Scale: LAZARA Davis    Inclusion Criteria  [x]   18 years of age or greater   []   Onset of symptoms < 4.5 hours before beginning treatment (stroke onset = time patient was last seen well or without symptoms).   []   Diagnosis of acute ischemic stroke causing measurable disabling deficit (Complete Hemianopia, Any Aphasia, Visual or Sensory Extinction, Any weakness limiting sustained effort against gravity)   []   Any remaining deficit considered potentially disabling in view of patient and practitioner   Exclusion criteria (Do not proceed with Alteplase if any are checked under exclusion criteria)  [x]   Onset unknown or GREATER than 4.5 hours   [x]   ICH on CT/MRI   []   CT demonstrates hypodensity representing acute or subacute infarct   []   Significant head trauma or prior stroke in the previous 3 months   []   Symptoms suggestive of subarachnoid hemorrhage   []   History of un-ruptured intracranial aneurysm GREATER than 10 mm   []   Recent intracranial or intraspinal surgery within the last 3 months   []   Arterial puncture at a non-compressible site in the previous 7 days   []   Active internal bleeding   []   Acute bleeding tendency   []   Platelet count LESS than 100,000 for known hematological diseases such as leukemia, thrombocytopenia or  chronic cirrhosis   []   Current use of anticoagulant with INR GREATER than 1.7 or PT GREATER than 15 seconds, aPTT GREATER than 40 seconds   []   Heparin received within 48 hours, resulting in abnormally elevated aPTT GREATER than upper limit of normal   []   Current use of direct thrombin inhibitors or direct factor Xa inhibitors in the past 48 hours   []   Elevated blood pressure refractory to treatment (systolic GREATER than 185 mm/Hg or diastolic  GREATER than 110 mm/Hg   []   Suspected infective endocarditis and aortic arch dissection   []   Current use of therapeutic treatment dose of low-molecular-weight heparin (LMWH) within the previous 24 hours   []   Structural GI malignancy or bleed   Relative exclusion for all patients  []   Only minor non-disabling symptoms   []   Pregnancy   []   Seizure at onset with postictal residual neurological impairments   []   Major surgery or previous trauma within past 14 days   []   History of previous spontaneous ICH, intracranial neoplasm, or AV malformation   []   Postpartum (within previous 14 days)   []   Recent GI or urinary tract hemorrhage (within previous 21 days)   []   Recent acute MI (within previous 3 months)   []   History of un-ruptured intracranial aneurysm LESS than 10 mm   []   History of ruptured intracranial aneurysm   []   Blood glucose LESS than 50 mg/dL (2.7 mmol/L)   []   Dural puncture within the last 7 days   []   Known GREATER than 10 cerebral microbleeds   Additional exclusions for patients with symptoms onset between 3 and 4.5 hours.  []   Age > 80.   []   On any anticoagulants regardless of INR  >>> Warfarin (Coumadin), Heparin, Enoxaparin (Lovenox), fondaparinux (Arixtra), bivalirudin (Angiomax), Argatroban, dabigatran (Pradaxa), rivaroxaban (Xarelto), or apixaban (Eliquis)   []   Severe stroke (NIHSS > 25).   []   History of BOTH diabetes and previous ischemic stroke.   []   The risks and benefits have been discussed with the patient or  family related to the administration of IV thrombolytic therapy for stroke symptoms.   []   I have discussed and reviewed the patient's case and imaging with the attending prior to IV thrombolytic therapy.   N/A Time IV thrombolytic administered       Hospital Meds:  Scheduled- acetaminophen, 1,000 mg, Oral, Once  aspirin, 324 mg, Oral, Once  cefTRIAXone, 2 g, Intravenous, Once  haloperidol lactate, 10 mg, Intramuscular, Once  LORazepam, 1 mg, Oral, Once      Infusions-     PRNs- •  sodium chloride    Functional Status Prior to Current Stroke/Nicholas Score: 1-2 (Patient reports questionable use of cane, significant other at bedside reports she intermittently uses this)    NIH Stroke Scale  Time: 22:30 EST  Person Administering Scale: LAZARA Davis  Interval: baseline  1a. Level of Consciousness: 0-->Alert, keenly responsive  1b. LOC Questions: 1-->Answers one question correctly  1c. LOC Commands: 0-->Performs both tasks correctly  2. Best Gaze: 0-->Normal  3. Visual: 0-->No visual loss  4. Facial Palsy: 0-->Normal symmetrical movements  5a. Motor Arm, Left: 0-->No drift, limb holds 90 (or 45) degrees for full 10 secs  5b. Motor Arm, Right: 0-->No drift, limb holds 90 (or 45) degrees for full 10 secs  6a. Motor Leg, Left: 1-->Drift, leg falls by the end of the 5-sec period but does not hit bed  6b. Motor Leg, Right: 1-->Drift, leg falls by the end of the 5-sec period but does not hit bed  7. Limb Ataxia: 0-->Absent  8. Sensory: 1-->Mild-to-moderate sensory loss, patient feels pinprick is less sharp or is dull on the affected side, or there is a loss of superficial pain with pinprick, but patient is aware of being touched  9. Best Language: 0-->No aphasia, normal  10. Dysarthria: 0-->Normal  11. Extinction and Inattention (formerly Neglect): 0-->No abnormality    Total (NIH Stroke Scale): 4     Results Reviewed:  I have personally reviewed current lab, radiology, and data and agree with results.    Results for  orders placed during the hospital encounter of 10/31/22    Adult Transthoracic Echo Complete W/ Cont if Necessary Per Protocol (With Agitated Saline)    Interpretation Summary  •  Left ventricular systolic function is normal. Left ventricular ejection fraction appears to be 66 - 70%.  •  Saline test results are negative.  •  There is calcification of the aortic valve.  •  Estimated right ventricular systolic pressure from tricuspid regurgitation is normal (<35 mmHg). Calculated right ventricular systolic pressure from tricuspid regurgitation is 26 mmHg.     CT Head Without Contrast    Result Date: 11/14/2022  1.  8 mm focus of hyperdensity in the left thalamus in the area of patient's prior infarct, which could represent a new small focus of hemorrhage within the area of infarct. Follow-up is recommended. 2.  No other definite new intracranial abnormality is seen. 3.  Hypodensities in the left posterior corpus callosum, consistent with infarct seen on prior imaging.  Findings were called to the ordering provider by Dr. Pineda 11/14/2022 10:13 PM  This report was finalized on 11/14/2022 10:13 PM by Krzysztof Pineda MD.      Glucose 167  Sodium 137  Creatinine 0.74  ALT 25  AST 22  WBC 28.67  Hemoglobin 15.2  Hematocrit 45.9  Platelets 285  Protime 14.1  INR 1.09  PTT 33.6    Ethanol <10  UDS + THC     CT head positive for 8mm focus of hyper-density in the left thalamus in the area of the patient's prior infract which is concerning for a new small focus of hemorrhage within the infarct.     MRI brain pending    Assessment/Plan:    Rachana Patrick is a 47 year old female with known past medical history of HTN, HLD, T2DM, obesity, Recent CVA (BHR 10/22 L thalamic CVA with some posterior extension into occipital lobe thought to be d/t small vessel disease - on DAPT, residual right eye blurred vision), and tobacco abuse who presents to [facility] initially for evaluation of chest pain, she was then also found to have a 4 day  history of headache.  ICH Score = 0, GCS = 14 (E=4, M=6, V= 4)      Patient is not a candidate for IV TNK secondary to LKW >4.5H and ICH on CT scan    Patient is not a surgical intervention candidate at this time per Dr. Landin's discussion with neurosurgeon Dr. Joiner     Antiplatelet PTA: ASA/Plavix  Anticoagulant PTA: None        1. Left thalamic ICH  1. Will initiate Hemorrhagic stroke order set  2. Hyperdense area is within area of old infarct, concerning for hemorrhagic transformation of previous CVA, will obtain MRI brain to better assess  3. Stat MRI brain without contrast  4. SBP <140, may use Cardene gtt as needed to obtain BP goal  5. Follow up CT head in AM  6. No antiplatelet or anticoagulant medications at this time  7. Patient had TTE completed on 10/31/22 which showed EF 66-70%, saline test results negative, normal left atrial size and volume  8. A1C  9. Fasting lipid panel  10. UA  11. UDS  12. Bedrest overnight, HOB 30 degrees  13. NPO pending bedside dysphagia evaluation, when cleared recommend cardiac consistent carb diet  14. Patient will need tobacco cessation counseling when clinically appropriate  15. Infectious workup per ED physician and intensivist    16. Neurosurgical consult       Plan of care discussed with ED physician Dr. Landin and neurosurgery Cynthia HUSSEIN. Per Dr. Landin he discussed patient case with Dr. Joiner who did not recommend surgical intervention at this time    LAZARA Davis  November 14, 2022  22:30 EST    Electronically signed by Neida Garcia APRN at 11/15/22 0040

## 2022-11-15 NOTE — ED PROVIDER NOTES
EMERGENCY DEPARTMENT ENCOUNTER    Pt Name: Rachana Patrick  MRN: [id number]  Pt :   1975  Room Number:  N230/1  Date of encounter:  2022  PCP: Jerel Agudelo MD  ED Provider: Josiah Landin MD    Historian: Patient and chart review      HPI:  Chief Complaint: chest pain        Context: Rachana Patrick is a 47 y.o. female who presents to the ED c/o 47-year-old female, with a history of diabetes and stroke, here with initial chief complaint of chest pain, also complaining of headache nausea which she tells me has been present for 4 days.  When asked about symptoms relating to illness or fever, she denies cough congestion dysuria or abdominal pain.  Does note a headache and says she has had this for 4 days.  The patient was discharged on , for a thalamic stroke, and admitted here last week for observation with a headache.      PAST MEDICAL HISTORY  Past Medical History:   Diagnosis Date   • CVA (cerebral vascular accident) (HCC) 10/22/2022   • Hyperlipidemia    • Hypertension    • Type 2 diabetes mellitus (HCC)          PAST SURGICAL HISTORY  History reviewed. No pertinent surgical history.      FAMILY HISTORY  Family History   Problem Relation Age of Onset   • Diabetes Mother    • Hypertension Mother    • Heart attack Mother    • Diabetes Father    • Hypertension Father          SOCIAL HISTORY  Social History     Socioeconomic History   • Marital status: Single   Tobacco Use   • Smoking status: Former     Packs/day: 0.50     Years: 15.00     Pack years: 7.50     Types: Cigarettes     Quit date: 10/24/2022     Years since quittin.0   • Smokeless tobacco: Never   Vaping Use   • Vaping Use: Never used   Substance and Sexual Activity   • Alcohol use: Yes     Comment: social   • Drug use: Never   • Sexual activity: Defer         ALLERGIES  Metformin        REVIEW OF SYSTEMS  Review of Systems         HENT: Negative for sneezing and sore throat.    Respiratory: Negative for  cough. Negative for shortness of breath.    Genitourinary: Negative.  Negative for difficulty urinating.     All systems reviewwed and negative except as noted in HPI.  All systems reviewed and negative except for those discussed in HPI.       PHYSICAL EXAM    I have reviewed the triage vital signs and nursing notes.    ED Triage Vitals [11/14/22 2034]   Temp Heart Rate Resp BP SpO2   100.4 °F (38 °C) 100 22 126/90 98 %      Temp src Heart Rate Source Patient Position BP Location FiO2 (%)   Oral Monitor Lying Left arm --       Physical Exam  GENERAL:   Appears alert to person interactive, slightly agitated  HENT: Nares patent.  EYES: No scleral icterus.  CV: Regular rhythm, regular rate.  RESPIRATORY: Normal effort.  No audible wheezes, rales or rhonchi.  ABDOMEN: Soft, nontender  MUSCULOSKELETAL: No deformities.   NEURO: Does not have focal deficits, answers questions with some tangential and some straightforward answers, does have some agitation.  SKIN: Warm, dry, no rash visualized.        LAB RESULTS  Recent Results (from the past 24 hour(s))   ECG 12 Lead ED Triage Standing Order; Chest Pain    Collection Time: 11/14/22  8:37 PM   Result Value Ref Range    QT Interval 334 ms    QTC Interval 433 ms   Troponin    Collection Time: 11/14/22  9:04 PM    Specimen: Blood   Result Value Ref Range    Troponin T <0.010 0.000 - 0.030 ng/mL   Comprehensive Metabolic Panel    Collection Time: 11/14/22  9:04 PM    Specimen: Blood   Result Value Ref Range    Glucose 167 (H) 65 - 99 mg/dL    BUN 10 6 - 20 mg/dL    Creatinine 0.74 0.57 - 1.00 mg/dL    Sodium 137 136 - 145 mmol/L    Potassium 4.0 3.5 - 5.2 mmol/L    Chloride 100 98 - 107 mmol/L    CO2 25.0 22.0 - 29.0 mmol/L    Calcium 9.7 8.6 - 10.5 mg/dL    Total Protein 8.3 6.0 - 8.5 g/dL    Albumin 4.40 3.50 - 5.20 g/dL    ALT (SGPT) 25 1 - 33 U/L    AST (SGOT) 22 1 - 32 U/L    Alkaline Phosphatase 144 (H) 39 - 117 U/L    Total Bilirubin 0.9 0.0 - 1.2 mg/dL    Globulin 3.9  gm/dL    A/G Ratio 1.1 g/dL    BUN/Creatinine Ratio 13.5 7.0 - 25.0    Anion Gap 12.0 5.0 - 15.0 mmol/L    eGFR 99.3 >60.0 mL/min/1.73   Lipase    Collection Time: 11/14/22  9:04 PM    Specimen: Blood   Result Value Ref Range    Lipase 15 13 - 60 U/L   BNP    Collection Time: 11/14/22  9:04 PM    Specimen: Blood   Result Value Ref Range    proBNP 395.2 0.0 - 450.0 pg/mL   Green Top (Gel)    Collection Time: 11/14/22  9:04 PM   Result Value Ref Range    Extra Tube Hold for add-ons.    Lavender Top    Collection Time: 11/14/22  9:04 PM   Result Value Ref Range    Extra Tube hold for add-on    Gold Top - SST    Collection Time: 11/14/22  9:04 PM   Result Value Ref Range    Extra Tube Hold for add-ons.    Gray Top    Collection Time: 11/14/22  9:04 PM   Result Value Ref Range    Extra Tube Hold for add-ons.    Light Blue Top    Collection Time: 11/14/22  9:04 PM   Result Value Ref Range    Extra Tube Hold for add-ons.    CBC Auto Differential    Collection Time: 11/14/22  9:04 PM    Specimen: Blood   Result Value Ref Range    WBC 28.67 (H) 3.40 - 10.80 10*3/mm3    RBC 5.09 3.77 - 5.28 10*6/mm3    Hemoglobin 15.2 12.0 - 15.9 g/dL    Hematocrit 45.9 34.0 - 46.6 %    MCV 90.2 79.0 - 97.0 fL    MCH 29.9 26.6 - 33.0 pg    MCHC 33.1 31.5 - 35.7 g/dL    RDW 12.5 12.3 - 15.4 %    RDW-SD 41.3 37.0 - 54.0 fl    MPV 10.7 6.0 - 12.0 fL    Platelets 285 140 - 450 10*3/mm3    Neutrophil % 82.8 (H) 42.7 - 76.0 %    Lymphocyte % 6.4 (L) 19.6 - 45.3 %    Monocyte % 9.0 5.0 - 12.0 %    Eosinophil % 0.7 0.3 - 6.2 %    Basophil % 0.4 0.0 - 1.5 %    Immature Grans % 0.7 (H) 0.0 - 0.5 %    Neutrophils, Absolute 23.77 (H) 1.70 - 7.00 10*3/mm3    Lymphocytes, Absolute 1.83 0.70 - 3.10 10*3/mm3    Monocytes, Absolute 2.57 (H) 0.10 - 0.90 10*3/mm3    Eosinophils, Absolute 0.19 0.00 - 0.40 10*3/mm3    Basophils, Absolute 0.11 0.00 - 0.20 10*3/mm3    Immature Grans, Absolute 0.20 (H) 0.00 - 0.05 10*3/mm3    nRBC 0.0 0.0 - 0.2 /100 WBC   Lactic  Acid, Plasma    Collection Time: 11/14/22  9:04 PM    Specimen: Blood   Result Value Ref Range    Lactate 1.5 0.5 - 2.0 mmol/L   Ethanol    Collection Time: 11/14/22  9:04 PM    Specimen: Blood   Result Value Ref Range    Ethanol <10 0 - 10 mg/dL   Protime-INR    Collection Time: 11/14/22  9:04 PM    Specimen: Blood   Result Value Ref Range    Protime 14.1 11.4 - 14.4 Seconds    INR 1.09 0.84 - 1.13   aPTT    Collection Time: 11/14/22  9:04 PM    Specimen: Blood   Result Value Ref Range    PTT 33.6 22.0 - 39.0 seconds   CK    Collection Time: 11/14/22  9:04 PM    Specimen: Blood   Result Value Ref Range    Creatine Kinase 50 20 - 180 U/L   Procalcitonin    Collection Time: 11/14/22  9:04 PM    Specimen: Blood   Result Value Ref Range    Procalcitonin 0.06 0.00 - 0.25 ng/mL   Respiratory Panel PCR w/COVID-19(SARS-CoV-2) LATONYA/SERAFIN/MANDO/PAD/COR/MAD/RALPH In-House, NP Swab in [facility], 3-4 HR TAT - Swab, Nasopharynx    Collection Time: 11/14/22 11:12 PM    Specimen: Nasopharynx; Swab   Result Value Ref Range    ADENOVIRUS, PCR Not Detected Not Detected    Coronavirus 229E Not Detected Not Detected    Coronavirus HKU1 Not Detected Not Detected    Coronavirus NL63 Not Detected Not Detected    Coronavirus OC43 Not Detected Not Detected    COVID19 Not Detected Not Detected - Ref. Range    Human Metapneumovirus Not Detected Not Detected    Human Rhinovirus/Enterovirus Not Detected Not Detected    Influenza A PCR Not Detected Not Detected    Influenza B PCR Not Detected Not Detected    Parainfluenza Virus 1 Not Detected Not Detected    Parainfluenza Virus 2 Not Detected Not Detected    Parainfluenza Virus 3 Not Detected Not Detected    Parainfluenza Virus 4 Not Detected Not Detected    RSV, PCR Not Detected Not Detected    Bordetella pertussis pcr Not Detected Not Detected    Bordetella parapertussis PCR Not Detected Not Detected    Chlamydophila pneumoniae PCR Not Detected Not Detected    Mycoplasma pneumo by PCR Not Detected Not  Detected   Urine Drug Screen - Urine, Clean Catch    Collection Time: 11/14/22 11:23 PM    Specimen: Urine, Clean Catch   Result Value Ref Range    THC, Screen, Urine Positive (A) Negative    Phencyclidine (PCP), Urine Negative Negative    Cocaine Screen, Urine Negative Negative    Methamphetamine, Ur Negative Negative    Opiate Screen Negative Negative    Amphetamine Screen, Urine Negative Negative    Benzodiazepine Screen, Urine Negative Negative    Tricyclic Antidepressants Screen Negative Negative    Methadone Screen, Urine Negative Negative    Barbiturates Screen, Urine Negative Negative    Oxycodone Screen, Urine Negative Negative    Propoxyphene Screen Negative Negative    Buprenorphine, Screen, Urine Negative Negative   Urinalysis With Culture If Indicated - Urine, Clean Catch    Collection Time: 11/14/22 11:23 PM    Specimen: Urine, Clean Catch   Result Value Ref Range    Color, UA Yellow Yellow, Straw    Appearance, UA Cloudy (A) Clear    pH, UA <=5.0 5.0 - 8.0    Specific Gravity, UA 1.043 (H) 1.001 - 1.030    Glucose, UA >=1000 mg/dL (3+) (A) Negative    Ketones, UA 15 mg/dL (1+) (A) Negative    Bilirubin, UA Negative Negative    Blood, UA Negative Negative    Protein, UA Negative Negative    Leuk Esterase, UA Trace (A) Negative    Nitrite, UA Negative Negative    Urobilinogen, UA 0.2 E.U./dL 0.2 - 1.0 E.U./dL   Urinalysis, Microscopic Only - Urine, Clean Catch    Collection Time: 11/14/22 11:23 PM    Specimen: Urine, Clean Catch   Result Value Ref Range    RBC, UA 0-2 None Seen, 0-2 /HPF    WBC, UA 13-20 (A) None Seen, 0-2 /HPF    Bacteria, UA 1+ (A) None Seen, Trace /HPF    Squamous Epithelial Cells, UA 21-30 (A) None Seen, 0-2 /HPF    Yeast, UA Small/1+ Budding Yeast None Seen /HPF    Methodology Manual Light Microscopy    Hemoglobin A1c    Collection Time: 11/15/22  3:06 AM    Specimen: Blood   Result Value Ref Range    Hemoglobin A1C 9.50 (H) 4.80 - 5.60 %   Lipid Panel    Collection Time: 11/15/22   3:06 AM    Specimen: Blood   Result Value Ref Range    Total Cholesterol 92 0 - 200 mg/dL    Triglycerides 103 0 - 150 mg/dL    HDL Cholesterol 29 (L) 40 - 60 mg/dL    LDL Cholesterol  43 0 - 100 mg/dL    VLDL Cholesterol 20 5 - 40 mg/dL    LDL/HDL Ratio 1.46    CBC (No Diff)    Collection Time: 11/15/22  3:06 AM    Specimen: Blood   Result Value Ref Range    WBC 20.76 (H) 3.40 - 10.80 10*3/mm3    RBC 4.43 3.77 - 5.28 10*6/mm3    Hemoglobin 13.2 12.0 - 15.9 g/dL    Hematocrit 39.6 34.0 - 46.6 %    MCV 89.4 79.0 - 97.0 fL    MCH 29.8 26.6 - 33.0 pg    MCHC 33.3 31.5 - 35.7 g/dL    RDW 12.5 12.3 - 15.4 %    RDW-SD 41.7 37.0 - 54.0 fl    MPV 10.7 6.0 - 12.0 fL    Platelets 234 140 - 450 10*3/mm3   Basic Metabolic Panel    Collection Time: 11/15/22  3:06 AM    Specimen: Blood   Result Value Ref Range    Glucose 238 (H) 65 - 99 mg/dL    BUN 13 6 - 20 mg/dL    Creatinine 0.86 0.57 - 1.00 mg/dL    Sodium 133 (L) 136 - 145 mmol/L    Potassium 3.9 3.5 - 5.2 mmol/L    Chloride 99 98 - 107 mmol/L    CO2 24.0 22.0 - 29.0 mmol/L    Calcium 8.5 (L) 8.6 - 10.5 mg/dL    BUN/Creatinine Ratio 15.1 7.0 - 25.0    Anion Gap 10.0 5.0 - 15.0 mmol/L    eGFR 82.9 >60.0 mL/min/1.73   Magnesium    Collection Time: 11/15/22  3:06 AM    Specimen: Blood   Result Value Ref Range    Magnesium 2.0 1.6 - 2.6 mg/dL   Phosphorus    Collection Time: 11/15/22  3:06 AM    Specimen: Blood   Result Value Ref Range    Phosphorus 2.6 2.5 - 4.5 mg/dL   POC Glucose Once    Collection Time: 11/15/22  6:21 AM    Specimen: Blood   Result Value Ref Range    Glucose 175 (H) 70 - 130 mg/dL       If labs were ordered, I independently reviewed the results.        RADIOLOGY  CT Head Without Contrast    Result Date: 11/15/2022  CT HEAD WO CONTRAST Date of Exam: 11/15/2022 4:37 EST Indication: Stroke, follow up Comparison: November 14, 2022. October 31, 2022. MRI November 1, 2022. Technique: CT of the head was obtained from the skull base to vertex without intravenous  contrast.  Coronal reconstructions were performed.  Automated exposure control and iterative reconstruction methods were used. FINDINGS No midline shift. Ventricles and sulci appear within normal limits and unchanged. Basal cisterns appear patent. The subtle area of hyperattenuation seen in the left thalamus on the prior exam appears slightly less conspicuous, but there does appear to be a persistent focus of increased attenuation relative to the prior head CT in this location. There is persistent hypodensity in the left posterior corpus callosum consistent with prior infarct. No definitive evidence of new hemorrhage or extra-axial collection. No definite new mass lesion no definitive evidence of an acute infarction. Paranasal sinuses and mastoid air cells appear clear. No acute calvarial abnormality is seen.     1.Subtle area of hyperattenuation in the left thalamus, slightly less conspicuous than on the prior exam. This represents a change compared to prior studies and again could be related to acute hemorrhage within an area of prior infarct. 2.Hypodensities in the posterior left corpus callosum, as before, consistent with prior infarct. 3.No definite new intracranial abnormality is seen. Electronically Signed: Krzysztof Pineda MD 11/15/2022 6:04 EST Workstation ID: [id number]    CT Head Without Contrast    Result Date: 11/14/2022   DATE OF EXAM: 11/14/2022 9:51 PM  PROCEDURE: CT HEAD WO CONTRAST-  INDICATIONS: headache  COMPARISON: 10/31/2022, MRI 11/01/2022.  TECHNIQUE: Routine transaxial cuts were obtained through the head without the administration of contrast. Automated exposure control and iterative reconstruction methods were used.  FINDINGS: No midline shift. Basal cisterns appear patent.  Ventricles and sulci appear within normal limits for patient age.  No extra-axial collection is identified.  There are hypodensities in the left posterior corpus callosum, corresponding with the area of infarct seen on the  prior exam. In the left thalamus, there is a small focus of hyperdensity seen on axial image 27 of series 3 measuring approximate 8 mm, which is in the location of infarct seen on prior imaging. This hyperdensity was not seen on prior imaging and could represent a small focus of hemorrhage within the area of prior infarction.  No other definite hemorrhage is seen. No other definite edema or findings of acute infarction. No mass lesion is seen.  Paranasal sinuses appear clear.  Mastoid air cells appear clear.  No acute calvarial abnormality is identified.      1.  8 mm focus of hyperdensity in the left thalamus in the area of patient's prior infarct, which could represent a new small focus of hemorrhage within the area of infarct. Follow-up is recommended. 2.  No other definite new intracranial abnormality is seen. 3.  Hypodensities in the left posterior corpus callosum, consistent with infarct seen on prior imaging.  Findings were called to the ordering provider by Dr. Pineda 11/14/2022 10:13 PM  This report was finalized on 11/14/2022 10:13 PM by Krzysztof Pineda MD.      XR Chest 1 View    Result Date: 11/14/2022  DATE OF EXAM: 11/14/2022 9:12 PM  PROCEDURE: XR CHEST 1 VW-  INDICATIONS: Chest Pain Triage Protocol  COMPARISON: No Comparisons Available  TECHNIQUE: Single radiographic view of the chest was obtained.  FINDINGS: No focal or diffuse pulmonary infiltrate is identified. No pleural effusion or pneumothorax is seen.  Heart size and mediastinal contours appear within normal limits.  No acute osseous abnormality is identified on this limited single view.      No radiographic findings of acute cardiopulmonary abnormality.  This report was finalized on 11/14/2022 9:37 PM by Krzysztof Pineda MD.          PROCEDURES    Procedures    ECG 12 Lead ED Triage Standing Order; Chest Pain   Final Result   Test Reason : CP   Blood Pressure :   */*   mmHG   Vent. Rate : 101 BPM     Atrial Rate : 101 BPM      P-R Int : 180 ms           QRS Dur :  78 ms       QT Int : 334 ms       P-R-T Axes :  66  58  46 degrees      QTc Int : 433 ms      Sinus tachycardia   Anterior infarct , age undetermined   Abnormal ECG   When compared with ECG of 31-OCT-2022 07:48,   No significant change was found   Confirmed by JOSIAH TRAN ([id number]) on 11/14/2022 10:19:57 PM      Referred By: ED MD           Confirmed By: JOSIAH TRAN              PROGRESS, DATA ANALYSIS, CONSULTS, AND MEDICAL DECISION MAKING        Differential diagnoses: Encephalopathy, stroke, intracranial hemorrhage    Patient has significant leukocytosis, clinically find that this may be reactive, and related to her stroke, and evidence of small thalamic bleed.    ED Course as of 11/15/22 0704   Mon Nov 14, 2022 2222 Spoke with Neida on the Stroke Team [TA]   2222 I requested Im sedation and IV/monitor to reassess BP from ED Staff [TA]   2230 I did speak to LUCINA Suarez, he recommended Neurology and Medical Management. [TA]   2324 Patient presents with headache for 4 days, and slight confusion, think the clinical history is suggestive of the thalami bleed from her prior thalamic stroke.  She does also have a low-grade temperature but has no cough, chest x-ray is clear, at this time urinalysis and viral testing are pending.  Regarding sepsis care, IV fluid boluses are contraindicated is watching the patient's blood pressure closely with the thalamic bleed, I have ordered Rocephin IV for coverage after blood cultures. [TA]      ED Course User Index  [TA] Josiah Tran MD     UA has resulted later in the evening, with nonspecific findings.        AS OF 07:04 EST VITALS:    BP - 105/76  HR - 96  TEMP - 99.6 °F (37.6 °C) (Axillary)  O2 SATS - 94%                  DIAGNOSIS  Final diagnoses:   Thalamic stroke (HCC)   Cerebrovascular accident (CVA), unspecified mechanism (HCC)   Altered mental status, unspecified altered mental status type   Leukocytosis, unspecified type          DISPOSITION  Admit           Josiah Landin MD  11/15/22 0704

## 2022-11-15 NOTE — CONSULTS
Order noted for diabetes education per stroke protocol. A1c 9.5%. GCS 14, inappropriate for education at this time per chart review. Will follow.

## 2022-11-15 NOTE — SIGNIFICANT NOTE
"Called by nursing staff because patient wants to leave AGAINST MEDICAL ADVICE.  She is admitted with a small intracranial hemorrhage, thalamic and poorly controlled blood pressure.  She is oriented to name, hospital, November 2022.  I discussed with her the risk of leaving, extending her bleed and potential for a large stroke or death.  She replied \"I do not care I am going home\".  I strongly encouraged her to stay and take care of her health and she still refused.  As she is oriented and appears to understand the consequences I did not feel I could hold her against her weil.  "

## 2022-11-15 NOTE — CASE MANAGEMENT/SOCIAL WORK
Case Management Discharge Note                Selected Continued Care - Discharged on 11/15/2022 Admission date: 11/14/2022 - Discharge disposition: Home or Self Care    Destination    No services have been selected for the patient.              Durable Medical Equipment    No services have been selected for the patient.              Dialysis/Infusion    No services have been selected for the patient.              Home Medical Care    No services have been selected for the patient.              Therapy    No services have been selected for the patient.              Community Resources    No services have been selected for the patient.              Community & DME    No services have been selected for the patient.                       Final Discharge Disposition Code: 07 - left AMA

## 2022-11-15 NOTE — PLAN OF CARE
Goal Outcome Evaluation:   Patient arrived approximately 0345. Uncooperative through night and unable to assess neuro status in depth. Stroke navigator at bedside. Follow up CT ordered. Unchanged from prior CT in ER. MRI ordered when cooperative. Pt complaint of headache. Tylenol administered at approx. 0615. Stroke navigator notified.

## 2022-11-15 NOTE — DISCHARGE SUMMARY
"DISCHARGE SUMMARY     Admit date: 11/14/2022  Date of Discharge:  11/15/2022    Discharge Diagnoses  Active Hospital Problems    Diagnosis  POA   • **Thalamic stroke (HCC) [I63.81]  Yes   • Left-sided nontraumatic intracerebral hemorrhage of brainstem (HCC) [I61.3]  Yes   • Acute cystitis without hematuria [N30.00]  Yes   • Type 2 diabetes mellitus with other specified complication (HCC) [E11.69]  Yes   • Tobacco abuse [Z72.0]  Yes   • Essential hypertension [I10]  Yes      Resolved Hospital Problems   No resolved problems to display.       History reviewed. No pertinent surgical history.    History of Present Illness    47 y.o. female with history of poorly controlled type 2 diabetes (hemoglobin A1c 9.9% on 10/22/2022), hypertension, hyperlipidemia, CVA in October 2022, who was brought in with complaint of confusion, headache, and chest pain.  This apparently was noticed by her boyfriend of 10 years.  On arrival, she had a fever of 100.4 and has a significantly elevated white blood cell count to 28,000.  Patient is currently fairly confused and drowsy and difficult to obtain a history from.  It does appear she was given Haldol since arrival here as she was having some agitation.  She was also given 1 mg of Ativan.     Patient does not think that she has had fevers.  She is a former smoker who quit 3 weeks ago.  Her boyfriend reports that she has been confused since her stroke.  Patient denies alcohol or illicit drug use.     Patient was given Rocephin in the emergency department.    Hospital Course    Admitted for reasons stated above in HPI. Stroke service was consulted and ordered a stat MRI brain without contrast and follow up CT head in AM. Unfortunately before any of these follow up scans could be obtained patient opted to leave against medical advice. She was oriented to person place time and situation. Dr. Nascimento educated her on the risk of leaving and she replied \"I do not care I am going home\". She " was strongly encouraged to stay and take care of her health but she continued to be adamant she was going to go home. As she was oriented she could not be held against her will and thus left AMA.    Physical Exam:  Unable to be obtained due to AMA     Procedures Performed: None    Consults:    - Stroke: Neida FOOTE and Dr. Joiner    Pertinent Test Results:   Results from last 7 days   Lab Units 11/15/22  0306   WBC 10*3/mm3 20.76*   HEMOGLOBIN g/dL 13.2   HEMATOCRIT % 39.6   PLATELETS 10*3/mm3 234     Results from last 7 days   Lab Units 11/15/22  0306 11/14/22  2104   SODIUM mmol/L 133* 137   POTASSIUM mmol/L 3.9 4.0   CHLORIDE mmol/L 99 100   CO2 mmol/L 24.0 25.0   BUN mg/dL 13 10   CREATININE mg/dL 0.86 0.74   EGFR mL/min/1.73 82.9 99.3   GLUCOSE mg/dL 238* 167*   CALCIUM mg/dL 8.5* 9.7   PHOSPHORUS mg/dL 2.6  --      Results from last 7 days   Lab Units 11/15/22  0306   HEMOGLOBIN A1C % 9.50*       Condition on Discharge:  AMA    Discharge Disposition: Home or Self Care    Discharge Medications:      Discharge Medications      Continue These Medications      Instructions Start Date   FreeStyle Tyson 3 Sensor misc   1 each, Does not apply, Every 14 Days      OneTouch Delica Plus [id number] misc   USE TO TEST TID AND PRN         ASK your doctor about these medications      Instructions Start Date   albuterol sulfate  (90 Base) MCG/ACT inhaler  Commonly known as: PROVENTIL HFA;VENTOLIN HFA;PROAIR HFA   INHALE 2 PUFFS PO Q 6 H PRN      amLODIPine 10 MG tablet  Commonly known as: Norvasc   10 mg, Oral, Daily      aspirin 81 MG chewable tablet   81 mg, Oral, Daily      atorvastatin 80 MG tablet  Commonly known as: LIPITOR   80 mg, Oral, Nightly      clopidogrel 75 MG tablet  Commonly known as: Plavix   75 mg, Oral, Daily      fenofibrate 160 MG tablet   TK 1 T PO QD WITH A MEAL      Insulin Lispro 100 UNIT/ML injection  Commonly known as: humaLOG   Subcutaneous, 3 Times Daily Before Meals      Januvia  100 MG tablet  Generic drug: SITagliptin   No dose, route, or frequency recorded.      Jardiance 25 MG tablet tablet  Generic drug: empagliflozin   No dose, route, or frequency recorded.      Levemir FlexTouch 100 UNIT/ML injection  Generic drug: insulin detemir   40 Units, Subcutaneous, Nightly      losartan 50 MG tablet  Commonly known as: COZAAR   50 mg, Oral, Daily, Hold unless SBP > 160      ONE TOUCH ULTRA TEST test strip  Generic drug: glucose blood   USE TO TEST TID AND PRN      glucose blood test strip   1 each, Other, 3 Times Daily, Use as instructed - One Touch Verio      pregabalin 300 MG capsule  Commonly known as: LYRICA   300 mg, Oral, 2 Times Daily             Discharge Diet: NPO Diet NPO Type: Strict NPO    Activity at Discharge: AMA    Follow-up Appointments  Future Appointments   Date Time Provider Department Center   12/12/2022 11:30 AM [facility]   12/13/2022  4:30 PM Jerel Agudelo MD [facility]         Test Results Pending at Discharge  Pending Labs     Order Current Status    Blood Culture - Blood, Arm, Left In process    Blood Culture - Blood, Arm, Left In process    Urine Culture - Urine, Urine, Clean Catch Preliminary result           There are no questions and answers to display.       LAZARA Peña  11/15/22  11:58 EST    Discharge Time Spent:     I spent 5  minutes on this discharge activity which included: face-to-face encounter with the patient, reviewing the data in the system, coordination of the care with the nursing staff as well as consultants, documentation, and entering orders.        Electronically signed by LAZARA Peña, 11/15/22, 11:58 AM EST.

## 2022-11-15 NOTE — OUTREACH NOTE
Medical Week 2 Survey    Flowsheet Row Responses   [facility] patient discharged from? [facility]   Does the patient have one of the following disease processes/diagnoses(primary or secondary)? Other   Week 2 attempt successful? No   Unsuccessful attempts Attempt 1   Revoke Readmitted          MITA SOLANO - Registered Nurse

## 2022-11-15 NOTE — CONSULTS
Stroke Consult Note    Patient Name: Rachana Patrick   MRN: [id number]  Age: 47 y.o.  Sex: female  : 1975    Primary Care Physician: Jerel Agudelo MD  Referring Physician:  Josiah Landin MD    TIME STROKE TEAM CALLED:  EST     TIME PATIENT SEEN:  EST    Handedness: Right  Race:     Chief Complaint/Reason for Consultation: CT head + L thalamic ICH    HPI: Rachana Patrick is a 47 year old female with known past medical history of HTN, HLD, T2DM, obesity, Recent CVA (BHR 10/22 L thalamic CVA with some posterior extension into occipital lobe thought to be d/t small vessel disease - on DAPT, residual right eye blurred vision), and tobacco abuse who presents to [facility] initially for evaluation of chest pain, she was then also found to have a  4 day history of headache.     Of note she was recently admitted to [facility] 10/31/2022-11/3/2022 for AMS, headache, blurred vision. During this admission her MRI showed extension of previous CVA with small scattered acute lacunar infarcts of the left thalamus, left caudate body, and left splenium of the corpus callosum. She was discharged home on ASA, Plavix, and Lipitor and advised to follow up with ophthalmology d/t right eye blurriness.     Asked to evaluate patient d/t her CT head showing 8mm area of hyperdensity in the left thalamus concerning for new small focus of hemorrhage within the infarct. Upon evaluation patient is confused (significant other at bedside states that this has been present intermittently since her previous stroke), she also has blurred vision which I am told she has had since her previous stroke. She has mild aphasia and dysarthria, her significant other reports that her speech has had fluctuations like this since her previous stroke as well. NIHSS = 4 for Aphasia, dysarthria, confusion, and mild decreased sensation right arm/leg. ICH Score = 0, GCS = 14 (E=4, M=6, V= 4) /76.    Dr. Landin reached out to Dr. Joiner  who stated that patient is not a surgical candidate.      Last Known Normal Date/Time: 4 days ago EST     Review of Systems   Constitutional: Negative for fatigue and fever.   HENT: Negative for trouble swallowing.    Eyes: Positive for visual disturbance.        Blurred vision R eye, present since previous CVA   Respiratory: Negative for cough and shortness of breath.    Cardiovascular: Positive for chest pain. Negative for palpitations.   Gastrointestinal: Positive for nausea. Negative for diarrhea and vomiting.   Genitourinary: Negative for dysuria and frequency.   Neurological: Positive for headaches.   Psychiatric/Behavioral: Positive for confusion.      Past Medical History:   Diagnosis Date   • Hyperlipidemia    • Hypertension    • Type 2 diabetes mellitus (HCC)      History reviewed. No pertinent surgical history.  Family History   Problem Relation Age of Onset   • Diabetes Mother    • Hypertension Mother    • Heart attack Mother    • Diabetes Father    • Hypertension Father      Social History     Socioeconomic History   • Marital status: Single   Tobacco Use   • Smoking status: Every Day     Packs/day: 0.50     Years: 15.00     Pack years: 7.50     Types: Cigarettes   • Smokeless tobacco: Never   Vaping Use   • Vaping Use: Never used   Substance and Sexual Activity   • Alcohol use: Yes     Comment: social   • Drug use: Never   • Sexual activity: Defer     Allergies   Allergen Reactions   • Metformin GI Intolerance     Prior to Admission medications    Medication Sig Start Date End Date Taking? Authorizing Provider   albuterol sulfate  (90 Base) MCG/ACT inhaler INHALE 2 PUFFS PO Q 6 H PRN 11/1/19   Provider, MD Malaika   amLODIPine (Norvasc) 10 MG tablet Take 1 tablet by mouth Daily. 11/3/22   Lino Damian MD   aspirin 81 MG chewable tablet Chew 1 tablet Daily. 10/25/22   Bronwyn Peace APRN   atorvastatin (LIPITOR) 80 MG tablet Take 1 tablet by mouth Every Night. 10/24/22   Kobi  LAZARA James   clopidogrel (Plavix) 75 MG tablet Take 1 tablet by mouth Daily. 10/24/22   Bronwyn Peace APRN   Continuous Blood Gluc Sensor (FreeStyle Tyson 3 Sensor) misc 1 each Every 14 (Fourteen) Days. 11/8/22   Jerel Agudelo MD   fenofibrate 160 MG tablet TK 1 T PO QD WITH A MEAL 12/11/19   Malaika Freed MD   glucose blood test strip 1 each by Other route 3 (Three) Times a Day. Use as instructed - One Touch Verio 11/8/22   Jerel Agudelo MD   Insulin Lispro (humaLOG) 100 UNIT/ML injection Inject  under the skin into the appropriate area as directed 3 (Three) Times a Day Before Meals.    Malaika Freed MD   Januvia 100 MG tablet  10/12/22   Malaika Freed MD   Jardiance 25 MG tablet tablet  10/12/22   Malaika Freed MD   Lancets (ONETOUCH DELICA PLUS [id number]) misc USE TO TEST TID AND PRN 11/1/19   Malaika Freed MD   Levemir FlexTouch 100 UNIT/ML injection Inject 40 Units under the skin into the appropriate area as directed Every Night. 11/8/22   Jerel Agudelo MD   losartan (COZAAR) 50 MG tablet Take 1 tablet by mouth Daily. Hold unless SBP > 160 10/24/22   Bronwyn Peace APRN   ONE TOUCH ULTRA TEST test strip USE TO TEST TID AND PRN 12/11/19   Malaika Freed MD   pregabalin (LYRICA) 300 MG capsule Take 1 capsule by mouth 2 (Two) Times a Day. 11/8/22   Jerel Agudelo MD         Temp:  [100.4 °F (38 °C)] 100.4 °F (38 °C)  Heart Rate:  [100] 100  Resp:  [22] 22  BP: (126)/(90) 126/90     Neurological Exam  Mental Status  Awake and alert. Oriented only to person and place. Mild dysarthria present. Expressive aphasia present.    Cranial Nerves  CN II: Able to correctly count number of fingers in all visual fields but does report blurred vision.  CN III, IV, VI: Extraocular movements intact bilaterally. Normal lids and orbits bilaterally. Pupils equal round and reactive to light bilaterally.  CN V: Facial sensation is normal.  CN  VII: Full and symmetric facial movement.  CN VIII: Hearing appears intact.  CN IX, X: Palate elevates symmetrically  CN XI: Shoulder shrug strength is normal.  CN XII: Tongue midline without atrophy or fasciculations.    Motor  Normal muscle bulk throughout. No fasciculations present. Normal muscle tone. No abnormal involuntary movements.  RUE 5/5  LUE 5/5  RLE 4/5  LLE 4/5.    Sensory  Light touch abnormality: Reports mild decreased sensation right arm/leg.     Reflexes                                            Right                      Left  Plantar                           Downgoing                Downgoing    Coordination  Right: Finger-to-nose normal. Rapid alternating movement normal. Heel-to-shin normal.Left: Finger-to-nose normal. Rapid alternating movement normal. Heel-to-shin normal.    Gait    Not observed.      Physical Exam  Constitutional:       General: She is awake.      Appearance: Normal appearance.   HENT:      Head: Normocephalic and atraumatic.      Mouth/Throat:      Mouth: Mucous membranes are moist.      Pharynx: Oropharynx is clear.   Eyes:      General: Lids are normal.      Extraocular Movements: Extraocular movements intact.      Pupils: Pupils are equal, round, and reactive to light.   Cardiovascular:      Rate and Rhythm: Normal rate and regular rhythm.   Pulmonary:      Effort: Pulmonary effort is normal. No respiratory distress.      Comments: Room air  Musculoskeletal:      Right lower leg: No edema.      Left lower leg: No edema.   Skin:     General: Skin is warm and dry.   Neurological:      Mental Status: She is alert. She is disoriented.      Cranial Nerves: Dysarthria present.      Sensory: Sensory deficit present.      Motor: Weakness present.   Psychiatric:      Comments: Mood and behavior abnormal, patient is impulsive and displays mild agitation and confusion       GCS = 14  ICH score = 0    Acute Stroke Data    Thrombolytic Inclusion / Exclusion Criteria    Time: 22:30  EST  Person Administering Scale: LAZARA Davis    Inclusion Criteria  [x]   18 years of age or greater   []   Onset of symptoms < 4.5 hours before beginning treatment (stroke onset = time patient was last seen well or without symptoms).   []   Diagnosis of acute ischemic stroke causing measurable disabling deficit (Complete Hemianopia, Any Aphasia, Visual or Sensory Extinction, Any weakness limiting sustained effort against gravity)   []   Any remaining deficit considered potentially disabling in view of patient and practitioner   Exclusion criteria (Do not proceed with Alteplase if any are checked under exclusion criteria)  [x]   Onset unknown or GREATER than 4.5 hours   [x]   ICH on CT/MRI   []   CT demonstrates hypodensity representing acute or subacute infarct   []   Significant head trauma or prior stroke in the previous 3 months   []   Symptoms suggestive of subarachnoid hemorrhage   []   History of un-ruptured intracranial aneurysm GREATER than 10 mm   []   Recent intracranial or intraspinal surgery within the last 3 months   []   Arterial puncture at a non-compressible site in the previous 7 days   []   Active internal bleeding   []   Acute bleeding tendency   []   Platelet count LESS than 100,000 for known hematological diseases such as leukemia, thrombocytopenia or chronic cirrhosis   []   Current use of anticoagulant with INR GREATER than 1.7 or PT GREATER than 15 seconds, aPTT GREATER than 40 seconds   []   Heparin received within 48 hours, resulting in abnormally elevated aPTT GREATER than upper limit of normal   []   Current use of direct thrombin inhibitors or direct factor Xa inhibitors in the past 48 hours   []   Elevated blood pressure refractory to treatment (systolic GREATER than 185 mm/Hg or diastolic  GREATER than 110 mm/Hg   []   Suspected infective endocarditis and aortic arch dissection   []   Current use of therapeutic treatment dose of low-molecular-weight heparin (LMWH) within  the previous 24 hours   []   Structural GI malignancy or bleed   Relative exclusion for all patients  []   Only minor non-disabling symptoms   []   Pregnancy   []   Seizure at onset with postictal residual neurological impairments   []   Major surgery or previous trauma within past 14 days   []   History of previous spontaneous ICH, intracranial neoplasm, or AV malformation   []   Postpartum (within previous 14 days)   []   Recent GI or urinary tract hemorrhage (within previous 21 days)   []   Recent acute MI (within previous 3 months)   []   History of un-ruptured intracranial aneurysm LESS than 10 mm   []   History of ruptured intracranial aneurysm   []   Blood glucose LESS than 50 mg/dL (2.7 mmol/L)   []   Dural puncture within the last 7 days   []   Known GREATER than 10 cerebral microbleeds   Additional exclusions for patients with symptoms onset between 3 and 4.5 hours.  []   Age > 80.   []   On any anticoagulants regardless of INR  >>> Warfarin (Coumadin), Heparin, Enoxaparin (Lovenox), fondaparinux (Arixtra), bivalirudin (Angiomax), Argatroban, dabigatran (Pradaxa), rivaroxaban (Xarelto), or apixaban (Eliquis)   []   Severe stroke (NIHSS > 25).   []   History of BOTH diabetes and previous ischemic stroke.   []   The risks and benefits have been discussed with the patient or family related to the administration of IV thrombolytic therapy for stroke symptoms.   []   I have discussed and reviewed the patient's case and imaging with the attending prior to IV thrombolytic therapy.   N/A Time IV thrombolytic administered       Hospital Meds:  Scheduled- acetaminophen, 1,000 mg, Oral, Once  aspirin, 324 mg, Oral, Once  cefTRIAXone, 2 g, Intravenous, Once  haloperidol lactate, 10 mg, Intramuscular, Once  LORazepam, 1 mg, Oral, Once      Infusions-     PRNs- •  sodium chloride    Functional Status Prior to Current Stroke/[place] Score: 1-2 (Patient reports questionable use of cane, significant other at bedside reports  she intermittently uses this)    NIH Stroke Scale  Time: 22:30 EST  Person Administering Scale: LAZARA Davis  Interval: baseline  1a. Level of Consciousness: 0-->Alert, keenly responsive  1b. LOC Questions: 1-->Answers one question correctly  1c. LOC Commands: 0-->Performs both tasks correctly  2. Best Gaze: 0-->Normal  3. Visual: 0-->No visual loss  4. Facial Palsy: 0-->Normal symmetrical movements  5a. Motor Arm, Left: 0-->No drift, limb holds 90 (or 45) degrees for full 10 secs  5b. Motor Arm, Right: 0-->No drift, limb holds 90 (or 45) degrees for full 10 secs  6a. Motor Leg, Left: 1-->Drift, leg falls by the end of the 5-sec period but does not hit bed  6b. Motor Leg, Right: 1-->Drift, leg falls by the end of the 5-sec period but does not hit bed  7. Limb Ataxia: 0-->Absent  8. Sensory: 1-->Mild-to-moderate sensory loss, patient feels pinprick is less sharp or is dull on the affected side, or there is a loss of superficial pain with pinprick, but patient is aware of being touched  9. Best Language: 0-->No aphasia, normal  10. Dysarthria: 0-->Normal  11. Extinction and Inattention (formerly Neglect): 0-->No abnormality    Total (NIH Stroke Scale): 4     Results Reviewed:  I have personally reviewed current lab, radiology, and data and agree with results.    Results for orders placed during the hospital encounter of 10/31/22    Adult Transthoracic Echo Complete W/ Cont if Necessary Per Protocol (With Agitated Saline)    Interpretation Summary  •  Left ventricular systolic function is normal. Left ventricular ejection fraction appears to be 66 - 70%.  •  Saline test results are negative.  •  There is calcification of the aortic valve.  •  Estimated right ventricular systolic pressure from tricuspid regurgitation is normal (<35 mmHg). Calculated right ventricular systolic pressure from tricuspid regurgitation is 26 mmHg.     CT Head Without Contrast    Result Date: 11/14/2022  1.  8 mm focus of  hyperdensity in the left thalamus in the area of patient's prior infarct, which could represent a new small focus of hemorrhage within the area of infarct. Follow-up is recommended. 2.  No other definite new intracranial abnormality is seen. 3.  Hypodensities in the left posterior corpus callosum, consistent with infarct seen on prior imaging.  Findings were called to the ordering provider by Dr. Pineda 11/14/2022 10:13 PM  This report was finalized on 11/14/2022 10:13 PM by Krzysztof Pineda MD.      Glucose 167  Sodium 137  Creatinine 0.74  ALT 25  AST 22  WBC 28.67  Hemoglobin 15.2  Hematocrit 45.9  Platelets 285  Protime 14.1  INR 1.09  PTT 33.6    Ethanol <10  UDS + THC     CT head positive for 8mm focus of hyper-density in the left thalamus in the area of the patient's prior infract which is concerning for a new small focus of hemorrhage within the infarct.     MRI brain pending    Assessment/Plan:    Rachana Patrick is a 47 year old female with known past medical history of HTN, HLD, T2DM, obesity, Recent CVA (BHR 10/22 L thalamic CVA with some posterior extension into occipital lobe thought to be d/t small vessel disease - on DAPT, residual right eye blurred vision), and tobacco abuse who presents to [facility] initially for evaluation of chest pain, she was then also found to have a 4 day history of headache.  ICH Score = 0, GCS = 14 (E=4, M=6, V= 4)      Patient is not a candidate for IV TNK secondary to LKW >4.5H and ICH on CT scan    Patient is not a surgical intervention candidate at this time per Dr. Landin's discussion with neurosurgeon Dr. Joiner     Antiplatelet PTA: ASA/Plavix  Anticoagulant PTA: None        1. Left thalamic ICH  1. Will initiate Hemorrhagic stroke order set  2. Hyperdense area is within area of old infarct, concerning for hemorrhagic transformation of previous CVA, will obtain MRI brain to better assess  3. Stat MRI brain without contrast  4. SBP <140, may use Cardene gtt as needed to  obtain BP goal  5. Follow up CT head in AM  6. No antiplatelet or anticoagulant medications at this time  7. Patient had TTE completed on 10/31/22 which showed EF 66-70%, saline test results negative, normal left atrial size and volume  8. A1C  9. Fasting lipid panel  10. UA  11. UDS  12. Bedrest overnight, HOB 30 degrees  13. NPO pending bedside dysphagia evaluation, when cleared recommend cardiac consistent carb diet  14. Patient will need tobacco cessation counseling when clinically appropriate  15. Infectious workup per ED physician and intensivist    16. Neurosurgical consult       Plan of care discussed with ED physician Dr. Landin and neurosurgery Cynthia HUSSEIN. Per Dr. Landin he discussed patient case with Dr. Joiner who did not recommend surgical intervention at this time    Neida Garcia, APRN  November 14, 2022  22:30 EST

## 2022-11-15 NOTE — H&P
Pulmonary/Critical Care History and Physical Exam     LOS: 1 day   Patient Care Team:  Jerel Agudelo MD as PCP - General (Family Medicine)  Yuki Hughes DO as Consulting Physician (Internal Medicine)    Chief Complaint:    Chief Complaint   Patient presents with   • Chest Pain       Subjective     HPI:     47 y.o. female with history of poorly controlled type 2 diabetes (hemoglobin A1c 9.9% on 10/22/2022), hypertension, hyperlipidemia, CVA in October 2022, who was brought in with complaint of confusion, headache, and chest pain.  This apparently was noticed by her boyfriend of 10 years.  On arrival, she had a fever of 100.4 and has a significantly elevated white blood cell count to 28,000.  Patient is currently fairly confused and drowsy and difficult to obtain a history from.  It does appear she was given Haldol since arrival here as she was having some agitation.  She was also given 1 mg of Ativan.    Patient does not think that she has had fevers.  She is a former smoker who quit 3 weeks ago.  Her boyfriend reports that she has been confused since her stroke.  Patient denies alcohol or illicit drug use.    Patient was given Rocephin in the emergency department.    History taken from: Patient, patient's boyfriend.    Past Medical History:   Diagnosis Date   • CVA (cerebral vascular accident) (HCC) 10/22/2022   • Hyperlipidemia    • Hypertension    • Type 2 diabetes mellitus (HCC)        History reviewed. No pertinent surgical history.    Family History   Problem Relation Age of Onset   • Diabetes Mother    • Hypertension Mother    • Heart attack Mother    • Diabetes Father    • Hypertension Father        Social History     Socioeconomic History   • Marital status: Single   Tobacco Use   • Smoking status: Every Day     Packs/day: 0.50     Years: 15.00     Pack years: 7.50     Types: Cigarettes   • Smokeless tobacco: Never   Vaping Use   • Vaping Use: Never used   Substance and Sexual Activity   •  Alcohol use: Yes     Comment: social   • Drug use: Never   • Sexual activity: Defer       Allergies   Allergen Reactions   • Metformin GI Intolerance       Past Medical History/Family History/Social History were reviewed by me and updates were made.     Review of Systems:    Review of 14 systems was completed with positives and pertinent negatives noted in the subjective section.  All other systems reviewed and are negative.         Objective     Vital Signs  Temp:  [100.4 °F (38 °C)] 100.4 °F (38 °C)  Heart Rate:  [] 98  Resp:  [16-22] 16  BP: ()/(51-97) 115/64  Body mass index is 30.11 kg/m².     No intake or output data in the 24 hours ending 11/15/22 0124   Physical Exam:     General Appearance:   Drowsy, intermittently confused, in no acute distress   Head:    Normocephalic, without obvious abnormality, atraumatic   Eyes:            Lids and lashes normal, conjunctivae and sclerae normal,       ENMT:   Ears appear intact with no abnormalities noted.    Oral mucosa moist.   Neck:   No adenopathy, supple, trachea midline, no thyromegaly,      no carotid bruit, no JVD   Lungs/resp:     Normal effort, symmetric chest rise, no crepitus, clear to      auscultation bilaterally, no chest wall tenderness                  Heart/CV:    Regular rhythm and normal rate, normal S1 and S2, no         murmur   Abdomen/GI:     Normal bowel sounds, no masses, no organomegaly, soft,    nontender, nondistended   G/U:     Deferred   Extremities/MSK:   No clubbing or cyanosis.  1+ bilateral lower extremity  edema.  Normal tone.  No deformities.    Pulses:   Pulses palpable and equal bilaterally   Skin:   No bleeding, bruising or rash.  Patient does have marks that appear consistent with the appearance of track marks on her arms bilaterally.   Hem/Lymph:   No cervical or supraclavicular adenopathy.    Neurologic:   Moves all extremities with no obvious focal motor deficit.              Psychiatric:  Normal mood and affect,  oriented x 3.     The above findings are documentation of my personal physical exam findings from today.   Electronically signed by:  Regan Hill MD  11/15/22  01:24 EST     Interval: baseline  1a. Level of Consciousness: 0-->Alert, keenly responsive  1b. LOC Questions: 1-->Answers one question correctly  1c. LOC Commands: 0-->Performs both tasks correctly  2. Best Gaze: 0-->Normal  3. Visual: 0-->No visual loss  4. Facial Palsy: 0-->Normal symmetrical movements  5a. Motor Arm, Left: 0-->No drift, limb holds 90 (or 45) degrees for full 10 secs  5b. Motor Arm, Right: 0-->No drift, limb holds 90 (or 45) degrees for full 10 secs  6a. Motor Leg, Left: 1-->Drift, leg falls by the end of the 5-sec period but does not hit bed  6b. Motor Leg, Right: 1-->Drift, leg falls by the end of the 5-sec period but does not hit bed  7. Limb Ataxia: 0-->Absent  8. Sensory: 1-->Mild-to-moderate sensory loss, patient feels pinprick is less sharp or is dull on the affected side, or there is a loss of superficial pain with pinprick, but patient is aware of being touched  9. Best Language: 0-->No aphasia, normal  10. Dysarthria: 0-->Normal  11. Extinction and Inattention (formerly Neglect): 0-->No abnormality    Total (NIH Stroke Scale): 4     Results Review:     I reviewed the patient's new clinical results.   Results from last 7 days   Lab Units 11/14/22 2104   SODIUM mmol/L 137   POTASSIUM mmol/L 4.0   CHLORIDE mmol/L 100   CO2 mmol/L 25.0   BUN mg/dL 10   CREATININE mg/dL 0.74   CALCIUM mg/dL 9.7   BILIRUBIN mg/dL 0.9   ALK PHOS U/L 144*   ALT (SGPT) U/L 25   AST (SGOT) U/L 22   GLUCOSE mg/dL 167*     Results from last 7 days   Lab Units 11/14/22 2104   WBC 10*3/mm3 28.67*   HEMOGLOBIN g/dL 15.2   HEMATOCRIT % 45.9   PLATELETS 10*3/mm3 285   Troponin was within normal limits.        I reviewed the patient's new imaging including images and reports.    CT head 11/14/2022:  IMPRESSION:  1.  8 mm focus of hyperdensity in the left  thalamus in the area of  patient's prior infarct, which could represent a new small focus of  hemorrhage within the area of infarct. Follow-up is recommended.  2.  No other definite new intracranial abnormality is seen.  3.  Hypodensities in the left posterior corpus callosum, consistent with  infarct seen on prior imaging.    EKG shows sinus tachycardia with no other significant abnormality.    Medication Review:   haloperidol lactate, 10 mg, Intramuscular, Once  insulin detemir, 20 Units, Subcutaneous, Daily  insulin lispro, 0-9 Units, Subcutaneous, 4x Daily With Meals & Nightly  Insulin Lispro, 2 Units, Subcutaneous, TID With Meals  LORazepam, 0.5 mg, Intravenous, Once  sodium chloride, 10 mL, Intravenous, Q12H      niCARdipine, 5-15 mg/hr, Last Rate: Stopped (11/14/22 3771)        Assessment & Plan     Active Hospital Problems    Diagnosis    • **Thalamic stroke (HCC)    • Left-sided nontraumatic intracerebral hemorrhage of brainstem (HCC)    • Type 2 diabetes mellitus with other specified complication (HCC)    • Tobacco abuse    • Essential hypertension      47 y.o. female with history of poorly controlled type 2 diabetes (hemoglobin A1c 9.9% on 10/22/2022), hypertension, hyperlipidemia, CVA in October 2022, who was brought in with complaint of confusion, headache, and chest pain.  This apparently was noticed by her boyfriend of 10 years.  On arrival, she had a fever of 100.4 and has a significantly elevated white blood cell count to 28,000.  Patient is currently fairly confused and drowsy and difficult to obtain a history from.  It does appear she was given Haldol since arrival here as she was having some agitation.  She was also given 1 mg of Ativan.    Patient does not think that she has had fevers.  She is a former smoker who quit 3 weeks ago.  Her boyfriend reports that she has been confused since her stroke.  Patient denies alcohol or illicit drug use.    Patient was given Rocephin in the emergency  department.    CT scan of the head shows an area of possible hemorrhage in the area previous thalamic infarct.  Patient has been seen by the stroke service who has ordered an MRI and recommended ICU admission.    Plan:  1.  For possible ICH/recent thalamic stroke/headache: Admit to ICU.  Statin.  MRI brain per neurology.  Case was discussed with neurosurgery and patient felt to require surgery currently.  2.  For hypertension: Cardene drip as needed for goal SBP < 140.  Resume home amlodipine.  3.  For diabetes: Levemir 20 mg daily for basal with 2 mg lispro for prandial coverage and moderate correction scale.  4.  For tobacco abuse: She reports she quit a few weeks ago.  Counseled on ongoing smoking cessation efforts.  5.  For fever/leukocytosis: COVID and respiratory panel were negative.  Follow-up cultures, urinalysis/culture.  Check procalcitonin.  Patient was given Rocephin in the emergency department.  Hold on additional antibiotics until additional information is obtained.  6.  DVT prophylaxis: Mechanical.    Patient is critically ill secondary to ICH and has high risk of life-threatening decline in condition.  As such, she requires continuous monitoring frequent reassessment for consideration of adjustment management to minimize this risk.  Personally reassessed her multiple times today.    Critical care time : 42 minutes spent by me personally.(This excludes time spent performing separately reportable procedures and services). Critical care time includes high complexity decision making to assess, manipulate, and support vital organ system failure in this individual who has impairment of one or more vital organ systems such that there is a high probability of imminent or life threatening deterioration in the patient’s condition.    Electronically signed by:  Regan Hill MD  11/15/22  01:24 EST      Please note that portions of this note were completed with [employer] - a voice recognition  program.

## 2022-11-15 NOTE — OUTREACH NOTE
Prep Survey    Flowsheet Row Responses   [facility] facility patient discharged from? [place]   Is LACE score < 7 ? No   Emergency Room discharge w/ pulse ox? No   Eligibility Not Eligible   What are the reasons patient is not eligible? Other  [left AMA]   Does the patient have one of the following disease processes/diagnoses(primary or secondary)? Stroke   Prep survey completed? Yes          LISETTE CASTAÑEDA - Registered Nurse

## 2022-11-16 LAB — BACTERIA SPEC AEROBE CULT: NO GROWTH

## 2022-11-16 NOTE — PAYOR COMM NOTE
"Kacie Aiken, RN  Utilization Management  P:[phone]  F:[phone]    Ref # [id number]  Keegan Daigle (47 y.o. Female)     Date of Birth   1975    Social Security Number       Address   [address] [place] [address]    Home Phone   [phone]    MRN   [id number]       Jew   None    Marital Status   Single                            Admission Date   11/14/22    Admission Type   Emergency    Admitting Provider   Regan Hill MD    Attending Provider       Department, Room/Bed   [facility] 2B ICU, N230/1       Discharge Date   11/15/2022    Discharge Disposition   Home or Self Care    Discharge Destination                               Attending Provider: (none)   Allergies: Metformin    Isolation: None   Infection: None   Code Status: Prior    Ht: 160 cm (63\")   Wt: 82.3 kg (181 lb 7 oz)    Admission Cmt: None   Principal Problem: Thalamic stroke (HCC) [I63.81]                 Active Insurance as of 11/14/2022     Primary Coverage     Payor Plan Insurance Group Employer/Plan Group    [employer] [place] [id number]     Payor Plan Address Payor Plan Phone Number Payor Plan Fax Number Effective Dates    [address] [address] [phone]  1/6/2020 - None Entered    [facility] [id number]       Subscriber Name Subscriber Birth Date Member ID       KEEGAN DAIGLE 1975 [id number]                 Emergency Contacts      (Rel.) Home Phone Work Phone Mobile Phone    BARRERA WETZEL (Significant Other) [phone] -- --    NichoFranki (Son) -- -- [phone]               Discharge Summary      Janine Mora, APRN at 11/15/22 1049     Attestation signed by Sofia Nascimento MD at 11/15/22 7861    I have reviewed this documentation and agree.  I reviewed the risk of leaving and potential to rebleed resulting in possible death. She still refused to stay.                  DISCHARGE SUMMARY     Admit date: 11/14/2022  Date of Discharge:  " "11/15/2022    Discharge Diagnoses  Active Hospital Problems    Diagnosis  POA   • **Thalamic stroke (HCC) [I63.81]  Yes   • Left-sided nontraumatic intracerebral hemorrhage of brainstem (HCC) [I61.3]  Yes   • Acute cystitis without hematuria [N30.00]  Yes   • Type 2 diabetes mellitus with other specified complication (HCC) [E11.69]  Yes   • Tobacco abuse [Z72.0]  Yes   • Essential hypertension [I10]  Yes      Resolved Hospital Problems   No resolved problems to display.       History reviewed. No pertinent surgical history.    History of Present Illness    47 y.o. female with history of poorly controlled type 2 diabetes (hemoglobin A1c 9.9% on 10/22/2022), hypertension, hyperlipidemia, CVA in October 2022, who was brought in with complaint of confusion, headache, and chest pain.  This apparently was noticed by her boyfriend of 10 years.  On arrival, she had a fever of 100.4 and has a significantly elevated white blood cell count to 28,000.  Patient is currently fairly confused and drowsy and difficult to obtain a history from.  It does appear she was given Haldol since arrival here as she was having some agitation.  She was also given 1 mg of Ativan.     Patient does not think that she has had fevers.  She is a former smoker who quit 3 weeks ago.  Her boyfriend reports that she has been confused since her stroke.  Patient denies alcohol or illicit drug use.     Patient was given Rocephin in the emergency department.    Hospital Course    Admitted for reasons stated above in HPI. Stroke service was consulted and ordered a stat MRI brain without contrast and follow up CT head in AM. Unfortunately before any of these follow up scans could be obtained patient opted to leave against medical advice. She was oriented to person place time and situation. Dr. Nascimento educated her on the risk of leaving and she replied \"I do not care I am going home\". She was strongly encouraged to stay and take care of her health but she " continued to be adamant she was going to go home. As she was oriented she could not be held against her will and thus left AMA.    Physical Exam:  Unable to be obtained due to AMA     Procedures Performed: None    Consults:    - Stroke: Neida FOOTE and Dr. Joiner    Pertinent Test Results:   Results from last 7 days   Lab Units 11/15/22  0306   WBC 10*3/mm3 20.76*   HEMOGLOBIN g/dL 13.2   HEMATOCRIT % 39.6   PLATELETS 10*3/mm3 234     Results from last 7 days   Lab Units 11/15/22  0306 11/14/22  2104   SODIUM mmol/L 133* 137   POTASSIUM mmol/L 3.9 4.0   CHLORIDE mmol/L 99 100   CO2 mmol/L 24.0 25.0   BUN mg/dL 13 10   CREATININE mg/dL 0.86 0.74   EGFR mL/min/1.73 82.9 99.3   GLUCOSE mg/dL 238* 167*   CALCIUM mg/dL 8.5* 9.7   PHOSPHORUS mg/dL 2.6  --      Results from last 7 days   Lab Units 11/15/22  0306   HEMOGLOBIN A1C % 9.50*       Condition on Discharge:  AMA    Discharge Disposition: Home or Self Care    Discharge Medications:      Discharge Medications      Continue These Medications      Instructions Start Date   FreeStyle Tyson 3 Sensor misc   1 each, Does not apply, Every 14 Days      OneTouch Delica Plus [id number] misc   USE TO TEST TID AND PRN         ASK your doctor about these medications      Instructions Start Date   albuterol sulfate  (90 Base) MCG/ACT inhaler  Commonly known as: PROVENTIL HFA;VENTOLIN HFA;PROAIR HFA   INHALE 2 PUFFS PO Q 6 H PRN      amLODIPine 10 MG tablet  Commonly known as: Norvasc   10 mg, Oral, Daily      aspirin 81 MG chewable tablet   81 mg, Oral, Daily      atorvastatin 80 MG tablet  Commonly known as: LIPITOR   80 mg, Oral, Nightly      clopidogrel 75 MG tablet  Commonly known as: Plavix   75 mg, Oral, Daily      fenofibrate 160 MG tablet   TK 1 T PO QD WITH A MEAL      Insulin Lispro 100 UNIT/ML injection  Commonly known as: humaLOG   Subcutaneous, 3 Times Daily Before Meals      Januvia 100 MG tablet  Generic drug: SITagliptin   No dose, route, or  frequency recorded.      Jardiance 25 MG tablet tablet  Generic drug: empagliflozin   No dose, route, or frequency recorded.      Levemir FlexTouch 100 UNIT/ML injection  Generic drug: insulin detemir   40 Units, Subcutaneous, Nightly      losartan 50 MG tablet  Commonly known as: COZAAR   50 mg, Oral, Daily, Hold unless SBP > 160      ONE TOUCH ULTRA TEST test strip  Generic drug: glucose blood   USE TO TEST TID AND PRN      glucose blood test strip   1 each, Other, 3 Times Daily, Use as instructed - One Touch Verio      pregabalin 300 MG capsule  Commonly known as: LYRICA   300 mg, Oral, 2 Times Daily             Discharge Diet: NPO Diet NPO Type: Strict NPO    Activity at Discharge: AMA    Follow-up Appointments  Future Appointments   Date Time Provider Department Center   12/12/2022 11:30 AM [facility]   12/13/2022  4:30 PM Jerel Agudelo MD [facility]         Test Results Pending at Discharge  Pending Labs     Order Current Status    Blood Culture - Blood, Arm, Left In process    Blood Culture - Blood, Arm, Left In process    Urine Culture - Urine, Urine, Clean Catch Preliminary result           There are no questions and answers to display.       LAZARA Peña  11/15/22  11:58 EST    Discharge Time Spent:     I spent 5  minutes on this discharge activity which included: face-to-face encounter with the patient, reviewing the data in the system, coordination of the care with the nursing staff as well as consultants, documentation, and entering orders.        Electronically signed by LAZARA Peña, 11/15/22, 11:58 AM EST.        Electronically signed by Sofia Nascimento MD at 11/15/22 8648

## 2022-11-19 LAB
BACTERIA SPEC AEROBE CULT: NORMAL
BACTERIA SPEC AEROBE CULT: NORMAL

## 2022-11-23 ENCOUNTER — TELEPHONE (OUTPATIENT)
Dept: FAMILY MEDICINE CLINIC | Facility: CLINIC | Age: 47
End: 2022-11-23

## 2022-11-23 NOTE — TELEPHONE ENCOUNTER
"Spoke w/ pt's significant other and he is worried because her eye sight is getting worse and she is having BP fluctuations. I asked what kind of numbers they are getting and he say's, \"it gets up to 163/90 and they put her in the shower and it goes down and then they check it again later and the numbers go back up.\"     Informed him that is a little high but not extremely high. I would send this message back to you for your return on Monday. He wants her seen sooner than her next appt. Informed him if anything worsens or changes take her back to the ER. He voiced understanding.  "

## 2022-11-23 NOTE — TELEPHONE ENCOUNTER
Caller: BARRERA WETZEL    Relationship to patient: Emergency Contact    Best call back number: [phone]     Chief complaint: PATIENT EYE SITE IS GETTING WORSE AND OTHER COMPLICATIONS    Type of visit: OFFICE    Requested date: NEXT WEEK     If rescheduling, when is the original appointment: 12-13-22    Additional notes:PATIENT IS WILLING TO SEE ANYONE IN THE OFFICE.

## 2022-11-26 NOTE — TELEPHONE ENCOUNTER
I can see her on Tuesday but they need to bring in a log of her blood pressures so we can see how high AND how low the numbers are.

## 2022-11-28 NOTE — TELEPHONE ENCOUNTER
Incoming Refill Request      Medication requested (name and dose): clopidogrel (Plavix) 75 MG tablet    Pharmacy where request should be sent: [facility], [facility]    Additional details provided by patient: PATIENT HAS TWO DAYS LEFT    Best call back number:    [phone]        Does the patient have less than a 3 day supply:  [x] Yes  [] No    Pj Rowe Rep  11/28/22, 10:50 EST

## 2022-11-29 ENCOUNTER — OFFICE VISIT (OUTPATIENT)
Dept: FAMILY MEDICINE CLINIC | Facility: CLINIC | Age: 47
End: 2022-11-29

## 2022-11-29 VITALS
DIASTOLIC BLOOD PRESSURE: 68 MMHG | HEART RATE: 90 BPM | RESPIRATION RATE: 20 BRPM | HEIGHT: 63 IN | WEIGHT: 183 LBS | BODY MASS INDEX: 32.43 KG/M2 | TEMPERATURE: 97.5 F | SYSTOLIC BLOOD PRESSURE: 110 MMHG

## 2022-11-29 DIAGNOSIS — D72.829 LEUKOCYTOSIS, UNSPECIFIED TYPE: ICD-10-CM

## 2022-11-29 DIAGNOSIS — E66.09 CLASS 1 OBESITY DUE TO EXCESS CALORIES WITH SERIOUS COMORBIDITY AND BODY MASS INDEX (BMI) OF 34.0 TO 34.9 IN ADULT: ICD-10-CM

## 2022-11-29 DIAGNOSIS — I63.81 THALAMIC STROKE: ICD-10-CM

## 2022-11-29 DIAGNOSIS — Z79.4 TYPE 2 DIABETES MELLITUS WITH HYPERGLYCEMIA, WITH LONG-TERM CURRENT USE OF INSULIN: Primary | ICD-10-CM

## 2022-11-29 DIAGNOSIS — I10 ESSENTIAL HYPERTENSION: ICD-10-CM

## 2022-11-29 DIAGNOSIS — E11.65 TYPE 2 DIABETES MELLITUS WITH HYPERGLYCEMIA, WITH LONG-TERM CURRENT USE OF INSULIN: Primary | ICD-10-CM

## 2022-11-29 LAB — GLUCOSE BLDC GLUCOMTR-MCNC: 341 MG/DL (ref 70–130)

## 2022-11-29 PROCEDURE — 82962 GLUCOSE BLOOD TEST: CPT | Performed by: FAMILY MEDICINE

## 2022-11-29 PROCEDURE — 99214 OFFICE O/P EST MOD 30 MIN: CPT | Performed by: FAMILY MEDICINE

## 2022-11-29 RX ORDER — NITROFURANTOIN 25; 75 MG/1; MG/1
100 CAPSULE ORAL 2 TIMES DAILY
Qty: 14 CAPSULE | Refills: 0 | Status: SHIPPED | OUTPATIENT
Start: 2022-11-29 | End: 2022-12-15

## 2022-11-29 RX ORDER — CLOPIDOGREL BISULFATE 75 MG/1
75 TABLET ORAL DAILY
Qty: 30 TABLET | Refills: 2 | Status: SHIPPED | OUTPATIENT
Start: 2022-11-29 | End: 2023-01-13

## 2022-11-29 RX ORDER — INSULIN LISPRO 100 [IU]/ML
INJECTION, SOLUTION INTRAVENOUS; SUBCUTANEOUS
COMMUNITY
Start: 2022-11-16 | End: 2022-12-15 | Stop reason: SDUPTHER

## 2022-11-29 NOTE — PROGRESS NOTES
"Chief Complaint   Patient presents with   • Blurred Vision     3 weeks to a month ago. Stroke a month ago    • Headache   • Hypertension     BP has been running high will only come down for short time        Subjective      Rachana Patrick is a 47 y.o. who presents for recurrent stroke,  uncontrolled hypertension and diabetes.  Patient is also experiencing visual disturbance and has known diabetic retinopathy.  After patient's last visit here 3 weeks ago she returned to [facility] ER with mental status changes.  White blood cell count was elevated 20,000.  Patient had a poor quality urine specimen with culture ultimately revealing no growth.  CT scan of the head was performed which appeared to show new area of hemorrhage with in her t thalamic infarct that occurred in October.  Patient ultimately left AMA.  She comes in today and is accompanied by her son and .  Her blood pressure is not controlled at home with systolics in the 160s to over 200.  Blood sugars have not been higher than 250 according to family and she has had no hypoglycemia.  She has intermittent periods of confusion.  Patient herself describes expressive aphasia.  Family notes that they are more fatigued the patient comes she seems more likely to become confused.  Patient herself also notes headaches.  She has follow-up with neurology in 2 weeks.  She has ophthalmology evaluation in February.    The following portions of the patient's history were reviewed and updated as appropriate: current medications and problem list.    Review of Systems    Objective   Vital Signs:  /68   Pulse 90   Temp 97.5 °F (36.4 °C)   Resp 20   Ht 160 cm (63\")   Wt 83 kg (183 lb)   BMI 32.42 kg/m²             Physical Exam  Constitutional:       General: She is not in acute distress.     Appearance: Normal appearance. She is not ill-appearing.   Neurological:      Mental Status: She is alert and oriented to person, place, and time.      Cranial Nerves: Cranial " nerves 2-12 are intact.      Sensory: Sensation is intact.   Psychiatric:         Attention and Perception: She is inattentive.         Cognition and Memory: She exhibits impaired recent memory.      Comments: Patient does not recognize me is the same physician she saw 3 weeks prior          Result Review   The following data was reviewed by: Jerel Agudelo MD on 11/29/2022:  Common labs    Common Labs 11/1/22 11/1/22 11/14/22 11/14/22 11/15/22 11/15/22 11/15/22 11/15/22    0716 0716 2104 2104 0306 0306 0306 0306   Glucose  242 (A)  167 (A)   238 (A)    BUN  9  10   13    Creatinine  0.57  0.74   0.86    Sodium  136  137   133 (A)    Potassium  4.0  4.0   3.9    Chloride  105  100   99    Calcium  8.8  9.7   8.5 (A)    Albumin    4.40       Total Bilirubin    0.9       Alkaline Phosphatase    144 (A)       AST (SGOT)    22       ALT (SGPT)    25       WBC   28.67 (A)  20.76 (A)      Hemoglobin   15.2  13.2      Hematocrit   45.9  39.6      Platelets   285  234      Total Cholesterol 104     92     Triglycerides 258 (A)     103     HDL Cholesterol 21 (A)     29 (A)     LDL Cholesterol  43     43     Hemoglobin A1C        9.50 (A)   (A) Abnormal value       Comments are available for some flowsheets but are not being displayed.           Data reviewed: Radiologic studies CT scan brain November 14 November 15 and Recent hospitalization notes Discharge summary from November 14 and 15 stay at [facility]              Assessment and Plan  Diagnoses and all orders for this visit:    1. Type 2 diabetes mellitus with hyperglycemia, with long-term current use of insulin ([facility]) (Primary)  -     POC Glucose  -     Comprehensive Metabolic Panel    2. Thalamic stroke ([facility])  -     Comprehensive Metabolic Panel    3. Class 1 obesity due to excess calories with serious comorbidity and body mass index (BMI) of 34.0 to 34.9 in adult    4. Essential hypertension    5. Leukocytosis, unspecified type  -     CBC Auto Differential    Other  orders  -     nitrofurantoin, macrocrystal-monohydrate, (Macrobid) 100 MG capsule; Take 1 capsule by mouth 2 (Two) Times a Day.  Dispense: 14 capsule; Refill: 0    A long discussion was had with the patient and her family members regarding her current health.  Recommendations are as follows:  1.  Check blood pressure 3 times daily  2.  Check glucose 4 times daily.  Present to the ER if blood sugar greater than 400  3.  For any acute mental status changes check both blood pressure and glucose.  4.  No change in antihypertensive medications at this time as blood pressure in office was normal when assessed by the nurse and on repeat assessment by myself.  A family member will bring patient's home blood pressure monitor to the office where we will compare in office assessment with her portable machine.  If they are similar patient will need up titration of her losartan  5.  I have advised him to contact the ophthalmologist office and ask for a sooner appointment if there is a cancellation  6.  Patient was restarted on Lyrica at her last visit with me on November 8.  Patient had been off of that medication for slightly less than a month.  Patient's mental status today is certainly different than when I saw her 3 weeks ago.  I have asked her to discontinue her Lyrica at least until she sees neurology.  I have advised her son and significant other to watch closely to give their assessment of whether or not discontinuation of Lyrica improves patient's overall mental status  7.  For any acute worsening or fevers patient has been directed to go straight to the emergency department.  8.  CMP and CBC will be repeated.        Follow Up  No follow-ups on file.  Patient was given instructions and counseling regarding her condition or for health maintenance advice. Please see specific information pulled into the AVS if appropriate.

## 2022-11-30 LAB
ALBUMIN SERPL-MCNC: 4.2 G/DL (ref 3.8–4.8)
ALBUMIN/GLOB SERPL: 1.4 {RATIO} (ref 1.2–2.2)
ALP SERPL-CCNC: 137 IU/L (ref 44–121)
ALT SERPL-CCNC: 30 IU/L (ref 0–32)
AST SERPL-CCNC: 35 IU/L (ref 0–40)
BASOPHILS # BLD AUTO: 0.1 X10E3/UL (ref 0–0.2)
BASOPHILS NFR BLD AUTO: 1 %
BILIRUB SERPL-MCNC: 0.3 MG/DL (ref 0–1.2)
BUN SERPL-MCNC: 16 MG/DL (ref 6–24)
BUN/CREAT SERPL: 17 (ref 9–23)
CALCIUM SERPL-MCNC: 9.6 MG/DL (ref 8.7–10.2)
CHLORIDE SERPL-SCNC: 104 MMOL/L (ref 96–106)
CO2 SERPL-SCNC: 22 MMOL/L (ref 20–29)
CREAT SERPL-MCNC: 0.92 MG/DL (ref 0.57–1)
EGFRCR SERPLBLD CKD-EPI 2021: 76 ML/MIN/1.73
EOSINOPHIL # BLD AUTO: 0.3 X10E3/UL (ref 0–0.4)
EOSINOPHIL NFR BLD AUTO: 2 %
ERYTHROCYTE [DISTWIDTH] IN BLOOD BY AUTOMATED COUNT: 12.9 % (ref 11.7–15.4)
GLOBULIN SER CALC-MCNC: 3.1 G/DL (ref 1.5–4.5)
GLUCOSE SERPL-MCNC: 278 MG/DL (ref 70–99)
HCT VFR BLD AUTO: 42.8 % (ref 34–46.6)
HGB BLD-MCNC: 14.4 G/DL (ref 11.1–15.9)
IMM GRANULOCYTES # BLD AUTO: 0 X10E3/UL (ref 0–0.1)
IMM GRANULOCYTES NFR BLD AUTO: 0 %
LYMPHOCYTES # BLD AUTO: 2.3 X10E3/UL (ref 0.7–3.1)
LYMPHOCYTES NFR BLD AUTO: 17 %
MCH RBC QN AUTO: 29.4 PG (ref 26.6–33)
MCHC RBC AUTO-ENTMCNC: 33.6 G/DL (ref 31.5–35.7)
MCV RBC AUTO: 88 FL (ref 79–97)
MONOCYTES # BLD AUTO: 0.7 X10E3/UL (ref 0.1–0.9)
MONOCYTES NFR BLD AUTO: 5 %
NEUTROPHILS # BLD AUTO: 10.3 X10E3/UL (ref 1.4–7)
NEUTROPHILS NFR BLD AUTO: 75 %
PLATELET # BLD AUTO: 215 X10E3/UL (ref 150–450)
POTASSIUM SERPL-SCNC: 4.6 MMOL/L (ref 3.5–5.2)
PROT SERPL-MCNC: 7.3 G/DL (ref 6–8.5)
RBC # BLD AUTO: 4.89 X10E6/UL (ref 3.77–5.28)
SODIUM SERPL-SCNC: 140 MMOL/L (ref 134–144)
WBC # BLD AUTO: 13.7 X10E3/UL (ref 3.4–10.8)

## 2022-12-05 NOTE — TELEPHONE ENCOUNTER
Caller: BARRERA WETZEL    Relationship: Emergency Contact      Best call back number:  [phone]       Requested Prescriptions:   Requested Prescriptions     Pending Prescriptions Disp Refills   • atorvastatin (LIPITOR) 80 MG tablet 30 tablet 0     Sig: Take 1 tablet by mouth Every Night.      ALSO NEEDS  aspirin 81 MG chewable tablet    Pharmacy where request should be sent: [facility] [address] - [phone]  - [phone]     Additional details provided by patient:  PATIENT WAS GIVEN THE LIPITOR  MEDICATION WHEN SHE WAS IN THE [facility] IN October   SHE HAS 2 TABLETS LEFT OF THE MEDICATION   REQUESTING A REFILL IF SHE NEEDS TO STAY ON THE MEDICATION      BARRERA SAID THE PATIENT IS HAVING UTI'S QUITE OFTEN   PLEASE ADVISE       Does the patient have less than a 3 day supply:  [x] Yes  [] No

## 2022-12-06 RX ORDER — ASPIRIN 81 MG/1
81 TABLET, CHEWABLE ORAL DAILY
Qty: 30 TABLET | Refills: 11 | Status: SHIPPED | OUTPATIENT
Start: 2022-12-06 | End: 2023-01-13

## 2022-12-06 RX ORDER — ATORVASTATIN CALCIUM 80 MG/1
80 TABLET, FILM COATED ORAL NIGHTLY
Qty: 30 TABLET | Refills: 11 | Status: SHIPPED | OUTPATIENT
Start: 2022-12-06 | End: 2022-12-13

## 2022-12-13 ENCOUNTER — OFFICE VISIT (OUTPATIENT)
Dept: NEUROLOGY | Facility: CLINIC | Age: 47
End: 2022-12-13

## 2022-12-13 VITALS
HEART RATE: 91 BPM | TEMPERATURE: 98.7 F | BODY MASS INDEX: 32.25 KG/M2 | OXYGEN SATURATION: 98 % | HEIGHT: 63 IN | DIASTOLIC BLOOD PRESSURE: 82 MMHG | WEIGHT: 182 LBS | SYSTOLIC BLOOD PRESSURE: 118 MMHG

## 2022-12-13 DIAGNOSIS — Z86.73 HISTORY OF STROKE: Primary | ICD-10-CM

## 2022-12-13 DIAGNOSIS — Z72.0 TOBACCO ABUSE: ICD-10-CM

## 2022-12-13 DIAGNOSIS — E11.69 TYPE 2 DIABETES MELLITUS WITH OTHER SPECIFIED COMPLICATION, WITH LONG-TERM CURRENT USE OF INSULIN: ICD-10-CM

## 2022-12-13 DIAGNOSIS — I10 ESSENTIAL HYPERTENSION: ICD-10-CM

## 2022-12-13 DIAGNOSIS — E78.5 HYPERLIPIDEMIA LDL GOAL <70: ICD-10-CM

## 2022-12-13 DIAGNOSIS — R41.0 OCCASIONAL CONFUSION: ICD-10-CM

## 2022-12-13 DIAGNOSIS — Z79.4 TYPE 2 DIABETES MELLITUS WITH OTHER SPECIFIED COMPLICATION, WITH LONG-TERM CURRENT USE OF INSULIN: ICD-10-CM

## 2022-12-13 PROCEDURE — 99212 OFFICE O/P EST SF 10 MIN: CPT | Performed by: NURSE PRACTITIONER

## 2022-12-13 RX ORDER — ATORVASTATIN CALCIUM 40 MG/1
40 TABLET, FILM COATED ORAL DAILY
Qty: 30 TABLET | Refills: 11 | Status: SHIPPED | OUTPATIENT
Start: 2022-12-13 | End: 2023-12-13

## 2022-12-13 NOTE — PATIENT INSTRUCTIONS
-MRI brain   -Cardiac event monitor  -Ambulatory Referral to Endocrinology   -Referral to Diabetes Education  -Continue Aspirin and Plavix  -Change atorvastatin to 40mg nightly. Can cut remaining 80mg tablet in 1/2. New prescription for 40mg sent to the pharmacy.   -Stop smoking

## 2022-12-13 NOTE — PROGRESS NOTES
New Patient Office Visit      Encounter Date: 2022   Patient Name: Rachana Patrick  : 1975   MRN: [id number]   PCP: Jerel Agudelo MD    Referring Provider: No ref. provider found     Chief Complaint:    Chief Complaint   Patient presents with   • Follow-up       History of Present Illness: Rachana Patrick is a 47 y.o. female  with a PMH significant for LPCA stroke ([facility] 10/22/2022, right eye blurred vision), bilateral carotid disease, HTN, HLD,T2DM (uncontrolled), tobacco abuse, and marijuana use who presents to the [facility] ED on 10/31/2022 with c/o global severe headache 10/10 and blurred vision in her right eye.   She has had blurred vision in her right eye since her stroke on 10/22. There was an initial delay in imaging as patient is agitated and did not initially want an IV started. Discussed with her at length the need for advanced imaging and she voiced understanding. Migraine cocktail ordered. CT head negative for hemorrhage, recent left thalamic  infarct noted.  CTA H/N with bilateral carotid disease.  No LVO.  Diminutive right vertebral artery.   MRI revealed some extension of her recent L PCA stroke.  She was given a migraine cocktail with improvement in headache symptoms.  TTE with no PFO, no LAE.  NIH at discharge was 1.  She was discharged on DAPT with aspirin and Plavix as well as high-dose statin/fenofibrate.  She was ordered to follow-up with  ophthalmology for driving clearance.  On 2022, she presented to [facility] ED  with chest pain and confusion.  CT of the head revealed a subtle 8 mm area of hyperdensity in the left thalamus concerning for possible hemorrhage within her previous stroke bed.  Area of suspected hemorrhage was less apparent on repeat CT 11/15.  Patient was also noted to have leukocytosis with WBCs of 28,000.  Cultures  were ordered. Per her significant other, she was noted to have intermittent confusion since her stroke.  Toxicology screen was positive for  THC.  She was admitted to the ICU for further management.  Unfortunately, the patient left AMA prior to obtaining MRI and obtaining cultures.     Clinic clinic visit 12/13/2022: Per review of medical record, the patient had been started on Lyrica by her PCP.  Per her family, she had increased confusion since initiation of the medication.  She followed up with her PCP on 11/29/2022 with orders to discontinue Lyrica until the patient was evaluated today.  Patient reports he continues she continues to have LHH.  She continues with intermittent bouts of confusion and memory issues as well as intermittent coordination difficulties.  She did not discontinue her Lyrica.  She reports significant diabetic neuropathy in her feet.  Patient reports that she had been on it for years and tolerated it well prior to seeing her new PCP in November.  She reports that her blood pressure has been doing okay at home.  Blood sugars have been greater than 200.  She is due to follow-up with ophthalmology in February.  NIH 1 today for LHH.  GCS is 15.  She reports that she has been compliant with her aspirin, Plavix, and high-dose statin.        Stroke Risk Factors: carotid stenosis, diabetes mellitus, hyperlipidemia, hypertension and smoking      Subjective      Review of Systems:   Review of Systems   Constitutional: Negative.    HENT: Negative.    Eyes: Positive for visual disturbance. Negative for photophobia.   Respiratory: Negative.    Cardiovascular: Negative.    Gastrointestinal: Negative.    Endocrine: Negative.    Genitourinary: Negative.    Musculoskeletal: Positive for gait problem.   Neurological: Positive for memory problem and confusion.       Past Medical History:   Past Medical History:   Diagnosis Date   • CVA (cerebral vascular accident) (HCC) 10/22/2022   • Hyperlipidemia    • Hypertension    • Type 2 diabetes mellitus (HCC)        Past Surgical History: History reviewed. No pertinent surgical history.    Family History:    Family History   Problem Relation Age of Onset   • Diabetes Mother    • Hypertension Mother    • Heart attack Mother    • Diabetes Father    • Hypertension Father        Social History:   Social History     Socioeconomic History   • Marital status: Single   Tobacco Use   • Smoking status: Former     Packs/day: 0.50     Years: 15.00     Pack years: 7.50     Types: Cigarettes     Quit date: 10/24/2022     Years since quittin.1     Passive exposure: Current   • Smokeless tobacco: Never   Vaping Use   • Vaping Use: Never used   Substance and Sexual Activity   • Alcohol use: Yes     Comment: social   • Drug use: Not Currently   • Sexual activity: Defer       Medications:     Current Outpatient Medications:   •  albuterol sulfate  (90 Base) MCG/ACT inhaler, INHALE 2 PUFFS PO Q 6 H PRN, Disp: , Rfl: 0  •  amLODIPine (Norvasc) 10 MG tablet, Take 1 tablet by mouth Daily., Disp: 30 tablet, Rfl: 0  •  aspirin 81 MG chewable tablet, Chew 1 tablet Daily., Disp: 30 tablet, Rfl: 11  •  clopidogrel (Plavix) 75 MG tablet, Take 1 tablet by mouth Daily., Disp: 30 tablet, Rfl: 2  •  Continuous Blood Gluc Sensor (FreeStyle Tyson 3 Sensor) misc, 1 each Every 14 (Fourteen) Days., Disp: 2 each, Rfl: 11  •  fenofibrate 160 MG tablet, TK 1 T PO QD WITH A MEAL, Disp: , Rfl:   •  glucose blood test strip, 1 each by Other route 3 (Three) Times a Day. Use as instructed - One Touch Verio, Disp: 270 each, Rfl: 3  •  HumaLOG KwikPen 100 UNIT/ML solution pen-injector, , Disp: , Rfl:   •  Januvia 100 MG tablet, , Disp: , Rfl:   •  Jardiance 25 MG tablet tablet, , Disp: , Rfl:   •  Lancets (ONETOUCH DELICA PLUS [id number]) misc, USE TO TEST TID AND PRN, Disp: , Rfl: 0  •  Levemir FlexTouch 100 UNIT/ML injection, Inject 40 Units under the skin into the appropriate area as directed Every Night., Disp: 15 mL, Rfl: 1  •  losartan (COZAAR) 50 MG tablet, Take 1 tablet by mouth Daily. Hold unless SBP > 160, Disp: , Rfl:   •  nitrofurantoin,  "macrocrystal-monohydrate, (Macrobid) 100 MG capsule, Take 1 capsule by mouth 2 (Two) Times a Day., Disp: 14 capsule, Rfl: 0  •  ONE TOUCH ULTRA TEST test strip, USE TO TEST TID AND PRN, Disp: , Rfl:   •  pregabalin (LYRICA) 300 MG capsule, Take 1 capsule by mouth 2 (Two) Times a Day., Disp: 60 capsule, Rfl: 2  •  atorvastatin (Lipitor) 40 MG tablet, Take 1 tablet by mouth Daily., Disp: 30 tablet, Rfl: 11    Allergies:   Allergies   Allergen Reactions   • Metformin GI Intolerance       Objective     Physical Exam:  Vital Signs:   Vitals:    12/13/22 0848   BP: 118/82   Pulse: 91   Temp: 98.7 °F (37.1 °C)   SpO2: 98%   Weight: 82.6 kg (182 lb)   Height: 160 cm (62.99\")     Body mass index is 32.25 kg/m².     Physical Exam  Constitutional:       General: She is not in acute distress.  HENT:      Head: Normocephalic and atraumatic.   Eyes:      Extraocular Movements: Extraocular movements intact.      Pupils: Pupils are equal, round, and reactive to light.      Comments: [facility]   Cardiovascular:      Rate and Rhythm: Normal rate and regular rhythm.   Pulmonary:      Effort: Pulmonary effort is normal. No respiratory distress.   Abdominal:      General: There is no distension.      Palpations: Abdomen is soft.   Musculoskeletal:         General: Normal range of motion.      Cervical back: Normal range of motion and neck supple.   Skin:     General: Skin is warm and dry.      Capillary Refill: Capillary refill takes less than 2 seconds.   Neurological:      Mental Status: She is alert and oriented to person, place, and time.      Cranial Nerves: Cranial nerve deficit present.      Motor: Motor strength is normal.      Deep Tendon Reflexes:      Reflex Scores:       Bicep reflexes are 2+ on the right side and 2+ on the left side.       Patellar reflexes are 2+ on the right side and 2+ on the left side.  Psychiatric:         Mood and Affect: Mood normal.         Speech: Speech normal.         Behavior: Behavior normal. "       Neurological Exam  Mental Status  Alert. Oriented to person, place, time and situation. Oriented to person, place, and time. Recalls 3 of 3 objects immediately. Speech is normal. Language is fluent with no aphasia.    Cranial Nerves  CN II: Vision test: [facility]. Right visual acuity: finger movement. Left visual acuity: finger movement. Baseline [facility].  CN III, IV, VI: Extraocular movements intact bilaterally. Pupils equal round and reactive to light bilaterally.  CN V: Facial sensation is normal.  CN VII: Full and symmetric facial movement.  CN IX, X: Palate elevates symmetrically  CN XI: Shoulder shrug strength is normal.  CN XII: Tongue midline without atrophy or fasciculations.    Motor   Strength is 5/5 throughout all four extremities.    Sensory  Light touch is normal in upper and lower extremities.     Reflexes                                            Right                      Left  Biceps                                 2+                         2+  Patellar                                2+                         2+    Coordination  Right: Finger-to-nose normal.Left: Finger-to-nose normal.    Gait  Casual gait is normal including stance, stride, and arm swing.       NIH Stroke Scale    1a  Level of consciousness: 0=alert; keenly responsive   1b. LOC questions:  0=Answers both questions correctly    1c. LOC commands: 0=Performs both tasks correctly   2.  Best Gaze: 0=normal   3. Visual: 1=Partial hemianopia   4. Facial Palsy: 0=Normal symmetric movement   5a. Motor left arm: 0=No drift, limb holds 90 (or 45) degrees for full 10 seconds   5b.  Motor right arm: 0=No drift, limb holds 90 (or 45) degrees for full 10 seconds   6a. Motor left le=No drift, limb holds 90 (or 45) degrees for full 10 seconds   6b  Motor right le=No drift, limb holds 90 (or 45) degrees for full 10 seconds   7. Limb Ataxia: 0=Absent   8.  Sensory: 0=Normal; no sensory loss   9. Best Language:  0=No aphasia, normal   10.  Dysarthria: 0=Normal   11. Extinction and Inattention: 0=No abnormality    Total:   1         Modified Wisner Score: 2        0  No Symptoms    1 No significant disability. Able to carry out all usual activities, despite some symptoms.    2 Slight disability. Able to look after own affairs without assistance, but unable to carry out all previous activities.    3 Moderate disability. Requires some help, but able to walk unassisted.    4 Moderately severe disability. Unable to attend to own bodily needs without assistance, and unable to walk unassisted.    5 Severe disability. Requires constant nursing care and attention, bedridden, incontinent.    6 Dead        PHQ-9 Depression Screening  Little interest or pleasure in doing things?  0   Feeling down, depressed, or hopeless?  0   Trouble falling or staying asleep, or sleeping too much?  0   Feeling tired or having little energy?  0   Poor appetite or overeating?  0   Feeling bad about yourself - or that you are a failure or have let yourself or your family down?  0   Trouble concentrating on things, such as reading the newspaper or watching television?  1   Moving or speaking so slowly that other people could have noticed? Or the opposite - being so fidgety or restless that you have been moving around a lot more than usual?     Thoughts that you would be better off dead, or of hurting yourself in some way?     PHQ-9 Total Score  1   If you checked off any problems, how difficult have these problems made it for you to do your work, take care of things at home, or get along with other people?        Imaging Reviewed: CT Head Without Contrast    Result Date: 11/15/2022  1.Subtle area of hyperattenuation in the left thalamus, slightly less conspicuous than on the prior exam. This represents a change compared to prior studies and again could be related to acute hemorrhage within an area of prior infarct. 2.Hypodensities in the posterior left corpus callosum, as before,  consistent with prior infarct. 3.No definite new intracranial abnormality is seen. Electronically Signed: Krzysztof Pineda MD 11/15/2022 6:04 EST Workstation ID: [id number]    CT Head Without Contrast    Result Date: 11/14/2022  1.  8 mm focus of hyperdensity in the left thalamus in the area of patient's prior infarct, which could represent a new small focus of hemorrhage within the area of infarct. Follow-up is recommended. 2.  No other definite new intracranial abnormality is seen. 3.  Hypodensities in the left posterior corpus callosum, consistent with infarct seen on prior imaging.  Findings were called to the ordering provider by Dr. Pineda 11/14/2022 10:13 PM  This report was finalized on 11/14/2022 10:13 PM by Krzysztof Pineda MD.      CT Head Without Contrast    Result Date: 10/31/2022  1.  Hypodensity within the left thalamic area that could relate to a subacute infarct given history of restricted diffusion in this area on more recent MR which is not available for comparison from outside institution.  This report was finalized on 10/31/2022 9:38 AM by Chico Herron MD.      CT Angiogram Neck    Result Date: 10/31/2022   1. No large vessel intracranial arterial occlusion or filling defect to indicate cerebral thrombus. 2. Small caliber right vertebral artery with discontinuous flow. This could be on the basis of a chronic dissection versus hypoplasia with the atherosclerotic vascular disease causing areas of occlusion. 3. The left vertebral artery is dominant. It is widely patent. 4. Bilateral carotid artery stenosis. It measures 52% on the right side and 23% on the left side. 5. Additional vascular and nonvascular findings as noted above.  This report was finalized on 10/31/2022 9:56 AM by Ruben Garcia MD.      CT Angiogram Neck    Result Date: 10/22/2022  The right vertebral artery is identified proximally, however the majority of it is nonopacified. This is worrisome for possible dissection or occlusion. There is  likely retrograde filling of the distal right vertebral artery.  Significant stenosis of the right and left internal carotid arteries as above.  Findings were discussed with Dr. Root in the emergency department at 4:00 PM.   This study was performed with techniques to keep radiation doses as low as reasonably achievable (ALARA). Individualized dose reduction techniques using automated exposure control or adjustment of mA and/or kV according to the patient size were employed.  This report was signed and finalized on 10/22/2022 4:05 PM by Tristan Villarreal DO.    MRI Brain Without Contrast    Result Date: 11/1/2022  Small scattered acute lacunar infarcts of the left thalamus, left caudate body, and left splenium of the corpus callosum.  Findings discussed with stroke navigator by Dr. Harrison Turk via telephone at 8:57 AM 11/1/2022.  This report was finalized on 11/1/2022 8:57 AM by Harrison Turk MD.      MRI Brain Without Contrast    Result Date: 10/24/2022  There are a few small areas of acute infarcts in the left lateral ventricle peritrigonal region involving the left thalamus, tail of left caudate nucleus, and parasagittal/peritrigonal left occipital lobe. No evidence of hemorrhagic conversion.    These findings were discussed with Ms. Dl ALLAN on 10/24/2022 10:36 AM by Silvia Echols PA-C, over the phone.   Images personally reviewed, interpreted and dictated by JHONNY Burrell.  This report was signed and finalized on 10/24/2022 10:39 AM by JHONNY Burrell.    XR Chest 1 View    Result Date: 11/14/2022  No radiographic findings of acute cardiopulmonary abnormality.  This report was finalized on 11/14/2022 9:37 PM by Krzysztof Pineda MD.      XR Chest 1 View    Result Date: 10/23/2022  No acute cardiopulmonary process.  Continued followup is recommended.  This report was signed and finalized on 10/23/2022 8:44 AM by Tristan Villarreal DO.    CT Angiogram Head    Result Date: 10/22/2022  The right vertebral  artery is identified proximally, however the majority of it is nonopacified. This is worrisome for possible dissection or occlusion. There is likely retrograde filling of the distal right vertebral artery.  Significant stenosis of the right and left internal carotid arteries as above.  Findings were discussed with Dr. Root in the emergency department at 4:00 PM.   This study was performed with techniques to keep radiation doses as low as reasonably achievable (ALARA). Individualized dose reduction techniques using automated exposure control or adjustment of mA and/or kV according to the patient size were employed.  This report was signed and finalized on 10/22/2022 4:06 PM by Tristan Villarreal DO.    CT Head Without Contrast Stroke Protocol    Result Date: 10/22/2022  No acute hemorhage, mass effect, or midline shift..    3744.11 mGy.cm   This study was performed with techniques to keep radiation doses as low as reasonably achievable (ALARA). Individualized dose reduction techniques using automated exposure control or adjustment of mA and/or kV according to the patient size were employed.  This report was signed and finalized on 10/22/2022 3:42 PM by Tristan Villarreal DO.    CT Angiogram Head w AI Analysis of LVO    Result Date: 10/31/2022   1. No large vessel intracranial arterial occlusion or filling defect to indicate cerebral thrombus. 2. Small caliber right vertebral artery with discontinuous flow. This could be on the basis of a chronic dissection versus hypoplasia with the atherosclerotic vascular disease causing areas of occlusion. 3. The left vertebral artery is dominant. It is widely patent. 4. Bilateral carotid artery stenosis. It measures 52% on the right side and 23% on the left side. 5. Additional vascular and nonvascular findings as noted above.  This report was finalized on 10/31/2022 9:56 AM by Ruben Garcia MD.      CT CEREBRAL PERFUSION WITH & WITHOUT CONTRAST    Result Date: 10/22/2022  1.  Abnormal mean  transit time greater than 6 seconds of 11 mm in the left occipital lobe likely secondary to ischemia. Consider MRI.  Dr. Root in the emergency department was notified at 4:00 PM.    This report was signed and finalized on 10/22/2022 4:07 PM by Tristan Villarreal DO.      Laboratory Results:   WBC   Date Value Ref Range Status   11/29/2022 13.7 (H) 3.4 - 10.8 x10E3/uL Final     RBC   Date Value Ref Range Status   11/29/2022 4.89 3.77 - 5.28 x10E6/uL Final     Hemoglobin   Date Value Ref Range Status   11/29/2022 14.4 11.1 - 15.9 g/dL Final   11/15/2022 13.2 12.0 - 15.9 g/dL Final     Hematocrit   Date Value Ref Range Status   11/29/2022 42.8 34.0 - 46.6 % Final   11/15/2022 39.6 34.0 - 46.6 % Final     MCV   Date Value Ref Range Status   11/29/2022 88 79 - 97 fL Final   11/15/2022 89.4 79.0 - 97.0 fL Final     MCH   Date Value Ref Range Status   11/29/2022 29.4 26.6 - 33.0 pg Final   11/15/2022 29.8 26.6 - 33.0 pg Final     MCHC   Date Value Ref Range Status   11/29/2022 33.6 31.5 - 35.7 g/dL Final   11/15/2022 33.3 31.5 - 35.7 g/dL Final     RDW   Date Value Ref Range Status   11/29/2022 12.9 11.7 - 15.4 % Final   11/15/2022 12.5 12.3 - 15.4 % Final     RDW-SD   Date Value Ref Range Status   11/15/2022 41.7 37.0 - 54.0 fl Final     MPV   Date Value Ref Range Status   11/15/2022 10.7 6.0 - 12.0 fL Final     Platelets   Date Value Ref Range Status   11/29/2022 215 150 - 450 x10E3/uL Final   11/15/2022 234 140 - 450 10*3/mm3 Final     Neutrophil Rel %   Date Value Ref Range Status   11/29/2022 75 Not Estab. % Final     Neutrophil %   Date Value Ref Range Status   11/14/2022 82.8 (H) 42.7 - 76.0 % Final     Lymphocyte Rel %   Date Value Ref Range Status   11/29/2022 17 Not Estab. % Final     Lymphocyte %   Date Value Ref Range Status   11/14/2022 6.4 (L) 19.6 - 45.3 % Final     Monocyte Rel %   Date Value Ref Range Status   11/29/2022 5 Not Estab. % Final     Monocyte %   Date Value Ref Range Status   11/14/2022 9.0 5.0 -  12.0 % Final     Eosinophil Rel %   Date Value Ref Range Status   11/29/2022 2 Not Estab. % Final     Eosinophil %   Date Value Ref Range Status   11/14/2022 0.7 0.3 - 6.2 % Final     Basophil Rel %   Date Value Ref Range Status   11/29/2022 1 Not Estab. % Final     Basophil %   Date Value Ref Range Status   11/14/2022 0.4 0.0 - 1.5 % Final     Immature Grans %   Date Value Ref Range Status   11/14/2022 0.7 (H) 0.0 - 0.5 % Final     Neutrophils Absolute   Date Value Ref Range Status   11/29/2022 10.3 (H) 1.4 - 7.0 x10E3/uL Final     Neutrophils, Absolute   Date Value Ref Range Status   11/14/2022 23.77 (H) 1.70 - 7.00 10*3/mm3 Final     Lymphocytes Absolute   Date Value Ref Range Status   11/29/2022 2.3 0.7 - 3.1 x10E3/uL Final     Lymphocytes, Absolute   Date Value Ref Range Status   11/14/2022 1.83 0.70 - 3.10 10*3/mm3 Final     Monocytes Absolute   Date Value Ref Range Status   11/29/2022 0.7 0.1 - 0.9 x10E3/uL Final     Monocytes, Absolute   Date Value Ref Range Status   11/14/2022 2.57 (H) 0.10 - 0.90 10*3/mm3 Final     Eosinophils Absolute   Date Value Ref Range Status   11/29/2022 0.3 0.0 - 0.4 x10E3/uL Final     Eosinophils, Absolute   Date Value Ref Range Status   11/14/2022 0.19 0.00 - 0.40 10*3/mm3 Final     Basophils Absolute   Date Value Ref Range Status   11/29/2022 0.1 0.0 - 0.2 x10E3/uL Final     Basophils, Absolute   Date Value Ref Range Status   11/14/2022 0.11 0.00 - 0.20 10*3/mm3 Final     Immature Grans, Absolute   Date Value Ref Range Status   11/14/2022 0.20 (H) 0.00 - 0.05 10*3/mm3 Final     nRBC   Date Value Ref Range Status   11/14/2022 0.0 0.0 - 0.2 /100 WBC Final     Lab Results   Component Value Date    GLUCOSE 278 (H) 11/29/2022    BUN 16 11/29/2022    CREATININE 0.92 11/29/2022    BCR 17 11/29/2022    K 4.6 11/29/2022    CO2 22 11/29/2022    CALCIUM 9.6 11/29/2022    PROTENTOTREF 7.3 11/29/2022    ALBUMIN 4.2 11/29/2022    LABIL2 1.4 11/29/2022    AST 35 11/29/2022    ALT 30 11/29/2022      Lipid Panel    Lipid Panel 10/23/22 11/1/22 11/15/22   Total Cholesterol 171 104 92   Triglycerides 591 (A) 258 (A) 103   HDL Cholesterol 25 (A) 21 (A) 29 (A)   VLDL Cholesterol 88 (A) 40 20   LDL Cholesterol  58 43 43   LDL/HDL Ratio 1.11 1.50 1.46   (A) Abnormal value              Hemoglobin A1C 11/15- 9.5    Assessment / Plan      Assessment/Plan:   Diagnoses and all orders for this visit:    1. History of stroke (Primary)  -Recent left PCA territory infarct in October.  She was discharged home on DAPT with aspirin and Plavix for 90 days as well as high-dose statin and fenofibrate.   -Admitted to [facility] on 10/31/2022 with severe headache.  MRI revealed an extension of her previous L PCA stroke.  She was discharged home on DAPT and statin as well as fenofibrate.  -Admitted to [facility] on 11/14 with subtle area of hyperdensity in her previous stroke bed.  Less apparent on repeat CT head on 11/15.  Patient left AMA prior to completion of her work-up and obtaining MRI.  -Continue DAPT with aspirin and Plavix for 30 more days then transition to  mg monotherapy  -Cardiac event monitor  -MRI brain pending  -No driving until ophthalmology follow-up  -Diabetes/healthy heart diet  -Report to the ED with any stroke symptoms    2. Hyperlipidemia LDL goal <70  -LDL 43 on 11/15/2022, total cholesterol 92.  Triglycerides 103.   -Decrease Lipitor to 40 mg nightly.  Continue fenofibrate    3. Type 2 diabetes mellitus with other specified complication, with long-term current use of insulin ([facility])  -Hemoglobin A1c 9.5 on 11/15/2022  -Uncontrolled  -Goal serum glucose less than 140  -Ambulatory for referral to endocrinology  -Ambulatory referral for diabetes education  - Diabetes diet education provided    4. Essential hypertension  -Goal BP less than 130/80  -Management per PCP    5. Tobacco abuse  -Smoking cessation counseling  -Nicotine patch offered and declined     6.  Confusion  -Per family, patient with intermittent  confusion since previous stroke.  She was started on Lyrica by her PCP on 11/8/2022.  Unclear if this a contributing factor to her mental status.  This medication was ordered to be discontinued on 11/29/2022 per her PCP.  Per review of the patient's Rodney, her Lyrica was refilled on 12/6/2022.  Patient reports that she had taken it previously for years without problems for diabetic neuropathy.  -MRI brain pending      Discussed the importance of medication compliance and lifestyle modifications (adequate blood pressure control, adequate control of hyperlipidemia, adequate glycemic control, increase physical activity, and healthy diet) to help reduce the risk of future cerebrovascular events.  Also discussed the signs symptoms that would warrant the patient return back to the emergency department including unilateral weakness, unilateral numbness, visual disturbances, loss of balance, speech difficulties, and/or a sudden severe headache.        Follow Up:   Return in about 1 month (around 1/13/2023).        LAZARA Blank, AGACNP-[facility] Neuro Stroke

## 2022-12-15 ENCOUNTER — OFFICE VISIT (OUTPATIENT)
Dept: FAMILY MEDICINE CLINIC | Facility: CLINIC | Age: 47
End: 2022-12-15

## 2022-12-15 ENCOUNTER — OFFICE VISIT (OUTPATIENT)
Dept: ENDOCRINOLOGY | Facility: CLINIC | Age: 47
End: 2022-12-15

## 2022-12-15 ENCOUNTER — LAB (OUTPATIENT)
Dept: LAB | Facility: HOSPITAL | Age: 47
End: 2022-12-15

## 2022-12-15 VITALS
DIASTOLIC BLOOD PRESSURE: 62 MMHG | TEMPERATURE: 97.7 F | RESPIRATION RATE: 20 BRPM | HEART RATE: 75 BPM | HEIGHT: 62 IN | BODY MASS INDEX: 32.94 KG/M2 | WEIGHT: 179 LBS | SYSTOLIC BLOOD PRESSURE: 108 MMHG

## 2022-12-15 VITALS
WEIGHT: 178 LBS | SYSTOLIC BLOOD PRESSURE: 120 MMHG | DIASTOLIC BLOOD PRESSURE: 83 MMHG | HEIGHT: 62 IN | BODY MASS INDEX: 32.76 KG/M2 | HEART RATE: 73 BPM | OXYGEN SATURATION: 98 %

## 2022-12-15 DIAGNOSIS — K59.01 SLOW TRANSIT CONSTIPATION: ICD-10-CM

## 2022-12-15 DIAGNOSIS — E11.65 TYPE 2 DIABETES MELLITUS WITH HYPERGLYCEMIA, WITH LONG-TERM CURRENT USE OF INSULIN: ICD-10-CM

## 2022-12-15 DIAGNOSIS — Z79.4 TYPE 2 DIABETES MELLITUS WITH HYPERGLYCEMIA, WITH LONG-TERM CURRENT USE OF INSULIN: ICD-10-CM

## 2022-12-15 DIAGNOSIS — Z86.73 RECENT CEREBROVASCULAR ACCIDENT (CVA): ICD-10-CM

## 2022-12-15 DIAGNOSIS — Z79.4 LONG-TERM INSULIN USE: ICD-10-CM

## 2022-12-15 DIAGNOSIS — E11.65 TYPE 2 DIABETES MELLITUS WITH HYPERGLYCEMIA, WITH LONG-TERM CURRENT USE OF INSULIN: Primary | ICD-10-CM

## 2022-12-15 DIAGNOSIS — Z79.4 TYPE 2 DIABETES MELLITUS WITH HYPERGLYCEMIA, WITH LONG-TERM CURRENT USE OF INSULIN: Primary | ICD-10-CM

## 2022-12-15 DIAGNOSIS — I10 ESSENTIAL HYPERTENSION: Primary | ICD-10-CM

## 2022-12-15 LAB
EXPIRATION DATE: ABNORMAL
EXPIRATION DATE: NORMAL
GLUCOSE BLDC GLUCOMTR-MCNC: 133 MG/DL (ref 70–130)
HBA1C MFR BLD: 9 %
Lab: ABNORMAL
Lab: NORMAL

## 2022-12-15 PROCEDURE — 99213 OFFICE O/P EST LOW 20 MIN: CPT | Performed by: FAMILY MEDICINE

## 2022-12-15 PROCEDURE — 82043 UR ALBUMIN QUANTITATIVE: CPT | Performed by: INTERNAL MEDICINE

## 2022-12-15 PROCEDURE — 3052F HG A1C>EQUAL 8.0%<EQUAL 9.0%: CPT | Performed by: INTERNAL MEDICINE

## 2022-12-15 PROCEDURE — 83036 HEMOGLOBIN GLYCOSYLATED A1C: CPT | Performed by: INTERNAL MEDICINE

## 2022-12-15 PROCEDURE — 82570 ASSAY OF URINE CREATININE: CPT | Performed by: INTERNAL MEDICINE

## 2022-12-15 PROCEDURE — 99214 OFFICE O/P EST MOD 30 MIN: CPT | Performed by: INTERNAL MEDICINE

## 2022-12-15 PROCEDURE — 82947 ASSAY GLUCOSE BLOOD QUANT: CPT | Performed by: INTERNAL MEDICINE

## 2022-12-15 RX ORDER — INSULIN LISPRO 100 [IU]/ML
INJECTION, SOLUTION INTRAVENOUS; SUBCUTANEOUS
Qty: 60 ML | Refills: 1 | Status: SHIPPED | OUTPATIENT
Start: 2022-12-15 | End: 2023-04-06

## 2022-12-15 RX ORDER — SEMAGLUTIDE 1.34 MG/ML
0.25 INJECTION, SOLUTION SUBCUTANEOUS WEEKLY
Qty: 1.5 ML | Refills: 1 | Status: SHIPPED | OUTPATIENT
Start: 2022-12-15 | End: 2023-02-16 | Stop reason: SDUPTHER

## 2022-12-15 RX ORDER — BLOOD-GLUCOSE METER
1 KIT MISCELLANEOUS 4 TIMES DAILY
Qty: 1 EACH | Refills: 0 | Status: SHIPPED | OUTPATIENT
Start: 2022-12-15

## 2022-12-15 RX ORDER — INSULIN GLARGINE 100 [IU]/ML
45 INJECTION, SOLUTION SUBCUTANEOUS NIGHTLY
Qty: 45 ML | Refills: 1 | Status: SHIPPED | OUTPATIENT
Start: 2022-12-15 | End: 2023-02-22

## 2022-12-15 RX ORDER — LANCETS 30 GAUGE
1 EACH MISCELLANEOUS 4 TIMES DAILY
Qty: 400 EACH | Refills: 3 | Status: SHIPPED | OUTPATIENT
Start: 2022-12-15

## 2022-12-15 RX ORDER — POLYETHYLENE GLYCOL 3350 17 G/17G
17 POWDER, FOR SOLUTION ORAL DAILY
Qty: 850 G | Refills: 2 | Status: SHIPPED | OUTPATIENT
Start: 2022-12-15 | End: 2023-01-31 | Stop reason: SDUPTHER

## 2022-12-15 NOTE — PATIENT INSTRUCTIONS
Change from levemir to basaglar (if insurance will cover). Increase to 45 units at night.     Take humalog 12-15 units before meals plus extra if your blood sugar is high.   If BG is 150-199: extra 1 unit  -249: extra 2 units  -299: extra 3 units  -349: extra 4 units  +: extra 5 units.     Stop januvia.     Start ozempic (if covered by insurance and able to get from pharmacy) 0.25 mg once weekly. Increase to 0.5 mg weekly after 4 weeks.     Call the office if your glucose levels are > 200 consistently.

## 2022-12-15 NOTE — PROGRESS NOTES
"Chief Complaint   Patient presents with   • 5 WEEK F/UP POST STROKE      Patient reports that she doing okay. She is still having a loss of peripheral vision in her right eye. Patient also states that she still is having symptoms for a UTI including cloudy urine with a strong odor.    • Constipation     Patient reports that she hasn't had a BM in at least two weeks and when she did have a BM prior to that it was small.        Subjective      Rachana Patrick is a 47 y.o. who presents for hypertension f/u and complaint of constipation for 2 weeks w/o improvement.     Objective   Vital Signs:  /62   Pulse 75   Temp 97.7 °F (36.5 °C)   Resp 20   Ht 157.5 cm (62.01\")   Wt 81.2 kg (179 lb)   BMI 32.73 kg/m²     Physical Exam  Constitutional:       Appearance: Normal appearance.   Cardiovascular:      Rate and Rhythm: Normal rate and regular rhythm.      Pulses: Normal pulses.      Heart sounds: Normal heart sounds.   Pulmonary:      Effort: Pulmonary effort is normal.      Breath sounds: Normal breath sounds.   Abdominal:      General: Abdomen is flat. Bowel sounds are normal. There is no distension.      Palpations: There is no mass.   Neurological:      Mental Status: She is alert.          Result Review                     Assessment and Plan  Diagnoses and all orders for this visit:    1. Essential hypertension (Primary)  Assessment & Plan:  Hypertension is controlled and at goal.  Continue current treatment regimen.  Dietary sodium restriction.  Stop smoking.  Continue current medications.  Blood pressure will be reassessed at the next regular appointment.      2. Slow transit constipation  Comments:  Patient will use Miralax bid. If no improvment within 5 days contact office  Orders:  -     polyethylene glycol (MiraLax) 17 GM/SCOOP powder; Take 17 g by mouth Daily.  Dispense: 850 g; Refill: 2          Follow Up  No follow-ups on file.  Patient was given instructions and counseling regarding her " condition or for health maintenance advice. Please see specific information pulled into the AVS if appropriate.

## 2022-12-15 NOTE — ASSESSMENT & PLAN NOTE
Hypertension is controlled and at goal.  Continue current treatment regimen.  Dietary sodium restriction.  Stop smoking.  Continue current medications.  Blood pressure will be reassessed at the next regular appointment.

## 2022-12-15 NOTE — PROGRESS NOTES
"Chief Complaint   Patient presents with   • Diabetes          HPI   Rachana Patrick is a 47 y.o. female had concerns including Diabetes.      Pt was last seen in 2020. She has had multiple strokes since her last visit here. Most recent a little over a month ago.     A1c is 9 today.    She is checking blood sugars 3-4 times per day. Highest BGs are in the first part of the day.   Tells me her BGs are often near 200s but not much higher.   Typically checking before meals.   Current medications for diabetes include januvia 100 mg daily, jardiance 25 mg daily, levemir 40 units at night, humalog 12-15 units before meals.    Hasn't been on a GLP-1 agonist in the past.   No personal history of pancreatitis.     The following portions of the patient's history were reviewed and updated as appropriate: allergies, current medications, past family history, past medical history, past social history, past surgical history and problem list.      Review of Systems   Eyes: Positive for visual disturbance.   Endocrine:        See HPI   Neurological: Positive for memory problem and confusion.        Physical Exam  Vitals reviewed.   Constitutional:       Appearance: Normal appearance. She is obese.      Comments: Body mass index is 32.56 kg/m².   Cardiovascular:      Rate and Rhythm: Normal rate.   Pulmonary:      Effort: Pulmonary effort is normal.   Neurological:      General: No focal deficit present.      Mental Status: She is alert. Mental status is at baseline.   Psychiatric:         Mood and Affect: Mood normal.         Behavior: Behavior normal.        /83   Pulse 73   Ht 157.5 cm (62\")   Wt 80.7 kg (178 lb)   SpO2 98%   BMI 32.56 kg/m²      Labs and imaging    CMP:  Lab Results   Component Value Date    BUN 16 11/29/2022    CREATININE 0.92 11/29/2022    BCR 17 11/29/2022     11/29/2022    K 4.6 11/29/2022    CO2 22 11/29/2022    CALCIUM 9.6 11/29/2022    PROTENTOTREF 7.3 11/29/2022    ALBUMIN 4.2 11/29/2022 "    LABGLOBREF 3.1 11/29/2022    LABIL2 1.4 11/29/2022    BILITOT 0.3 11/29/2022    ALKPHOS 137 (H) 11/29/2022    AST 35 11/29/2022    ALT 30 11/29/2022     Lipid Panel:  Lab Results   Component Value Date    CHOL 92 11/15/2022    TRIG 103 11/15/2022    HDL 29 (L) 11/15/2022    VLDL 20 11/15/2022    LDL 43 11/15/2022     HbA1c:  Lab Results   Component Value Date    HGBA1C 9.0 12/15/2022    HGBA1C 9.50 (H) 11/15/2022     Glucose:    Lab Results   Component Value Date    POCGLU 133 (A) 12/15/2022       Assessment and plan  Diagnoses and all orders for this visit:    1. Type 2 diabetes mellitus with hyperglycemia, with long-term current use of insulin (HCC) (Primary)  Uncontrolled with hyperglycemia.  A1c 9.0 today.  Complicated by recent strokes.  Patient was last seen in 2020.  No metformin due to history of GI intolerance.    Discontinue Januvia and transition to GLP-1 agonist.  Prescription sent for Ozempic.  Cautioned that this medication has been on backorder, may need daily victoza if unable to get.  Also possible GI side effects, but will titrate up monthly on dose as tolerated/able.  Continue Jardiance 25 mg daily.  Change from levemir to basaglar (if insurance will cover). Increase to 45 units at night.   Take humalog 12-15 units before meals plus extra if blood sugar is high.   If BG is 150-199: extra 1 unit  -249: extra 2 units  -299: extra 3 units  -349: extra 4 units  +: extra 5 units.   Call the office if glucose levels are > 200 consistently.   Lipid and CMP up-to-date from November.  Check urine microalbumin today.  Check monofilament follow-up visit.  Ophtho exam should be updated yearly.  -     POC Glucose, Blood  -     POC Glycosylated Hemoglobin (Hb A1C)  -     Semaglutide,0.25 or 0.5MG/DOS, (Ozempic, 0.25 or 0.5 MG/DOSE,) 2 MG/1.5ML solution pen-injector; Inject 0.25 mg under the skin into the appropriate area as directed 1 (One) Time Per Week. Increase to 0.5 mg weekly  after 4 weeks  Dispense: 1.5 mL; Refill: 1  -     HumaLOG KwikPen 100 UNIT/ML solution pen-injector; 12-15 units with meals plus 1:50>150, MDD 60 units  Dispense: 60 mL; Refill: 1  -     Insulin Glargine (BASAGLAR KWIKPEN) 100 UNIT/ML injection pen; Inject 45 Units under the skin into the appropriate area as directed Every Night.  Dispense: 45 mL; Refill: 1  -     Microalbumin / Creatinine Urine Ratio - Urine, Clean Catch    2. Recent cerebrovascular accident (CVA)  Could be in part due to uncontrolled diabetes.  On Jardiance and GLP-1 agonist added.  Management of glucose levels as above.  The stroke has caused her some visual disturbances and she is following the clicks of the insulin pen to ensure she had administering the correct amount.  Recommended she have family confirm this when able to ensure accuracy of dosing.       Return in about 3 months (around 3/15/2023) for next scheduled follow up. The patient was instructed to contact the clinic with any interval questions or concerns.    Yuki Hughes, DO   Endocrinologist    Please note that portions of this note were completed with a voice recognition program.

## 2022-12-16 ENCOUNTER — PRIOR AUTHORIZATION (OUTPATIENT)
Dept: ENDOCRINOLOGY | Facility: CLINIC | Age: 47
End: 2022-12-16

## 2022-12-16 LAB
ALBUMIN UR-MCNC: 2 MG/DL
CREAT UR-MCNC: 62.4 MG/DL
MICROALBUMIN/CREAT UR: 32.1 MG/G

## 2022-12-16 NOTE — TELEPHONE ENCOUNTER
KEEGAN DAIGLE (Ornelas: [id number])  Rx #: [id number]  Ozempic (0.25 or 0.5 MG/DOSE) 2MG/1.5ML pen-injectors     Form  [employer] [place] Medicaid ePA Form 2017 NCPDP  Created  18 hours ago  Sent to Plan  42 minutes ago  Plan Response  41 minutes ago  Submit Clinical Questions  37 minutes ago  Determination  Favorable  37 minutes ago  Message from Plan  The request has been approved. The authorization is effective for a maximum of 12 fills from 12/16/2022 to 12/15/2023

## 2022-12-21 DIAGNOSIS — Z79.4 TYPE 2 DIABETES MELLITUS WITH HYPERGLYCEMIA, WITH LONG-TERM CURRENT USE OF INSULIN: Primary | ICD-10-CM

## 2022-12-21 DIAGNOSIS — E11.65 TYPE 2 DIABETES MELLITUS WITH HYPERGLYCEMIA, WITH LONG-TERM CURRENT USE OF INSULIN: Primary | ICD-10-CM

## 2022-12-21 RX ORDER — EMPAGLIFLOZIN 25 MG/1
25 TABLET, FILM COATED ORAL DAILY
Qty: 30 TABLET | Refills: 11 | Status: SHIPPED | OUTPATIENT
Start: 2022-12-21

## 2022-12-21 NOTE — TELEPHONE ENCOUNTER
Caller: BARRERA WETZEL    Relationship: Emergency Contact    Best call back number: [phone]    Requested Prescriptions:   Requested Prescriptions     Pending Prescriptions Disp Refills   • Jardiance 25 MG tablet tablet 30 tablet         Pharmacy where request should be sent: [facility] [address] - [phone]  - [phone]     Additional details provided by patient: PLEASE REFILL, THIS REQUEST WAS SENT TO WRONG DOCTOR BY PHARMACY    Does the patient have less than a 3 day supply:  [x] Yes  [] No OUT    Would you like a call back once the refill request has been completed: [x] Yes [] No    If the office needs to give you a call back, can they leave a voicemail: [x] Yes [] No    Pj Romano Rep   12/21/22 09:30 EST

## 2022-12-22 ENCOUNTER — OFFICE VISIT (OUTPATIENT)
Dept: CARDIOLOGY | Facility: HOSPITAL | Age: 47
End: 2022-12-22

## 2022-12-22 ENCOUNTER — TELEPHONE (OUTPATIENT)
Dept: NEUROLOGY | Facility: CLINIC | Age: 47
End: 2022-12-22

## 2022-12-22 ENCOUNTER — HOSPITAL ENCOUNTER (OUTPATIENT)
Dept: CARDIOLOGY | Facility: HOSPITAL | Age: 47
Discharge: HOME | End: 2022-12-22

## 2022-12-22 VITALS
HEIGHT: 62 IN | DIASTOLIC BLOOD PRESSURE: 67 MMHG | RESPIRATION RATE: 20 BRPM | SYSTOLIC BLOOD PRESSURE: 115 MMHG | TEMPERATURE: 97.4 F | WEIGHT: 181.06 LBS | HEART RATE: 89 BPM | BODY MASS INDEX: 33.32 KG/M2 | OXYGEN SATURATION: 98 %

## 2022-12-22 DIAGNOSIS — I10 ESSENTIAL HYPERTENSION: ICD-10-CM

## 2022-12-22 DIAGNOSIS — I63.81 THALAMIC STROKE: ICD-10-CM

## 2022-12-22 DIAGNOSIS — I63.81 THALAMIC STROKE: Primary | ICD-10-CM

## 2022-12-22 DIAGNOSIS — E78.2 MIXED HYPERLIPIDEMIA: ICD-10-CM

## 2022-12-22 DIAGNOSIS — I65.23 BILATERAL CAROTID ARTERY STENOSIS: ICD-10-CM

## 2022-12-22 PROCEDURE — 93270 REMOTE 30 DAY ECG REV/REPORT: CPT

## 2022-12-22 PROCEDURE — 99214 OFFICE O/P EST MOD 30 MIN: CPT | Performed by: NURSE PRACTITIONER

## 2022-12-22 NOTE — PROGRESS NOTES
"Chief Complaint  Atrial Fibrillation and Stroke    Subjective      History of Present Illness {  Problem List  Visit  Diagnosis   Encounters  Notes  Medications  Labs  Result Review Imaging  Media :23}     Rachana Patrick, 47 y.o. female with recent CVA, T2DM, carotid artery stenosis, hypertension, hyperlipidemia presents to [facility] for Atrial Fibrillation and Stroke.     Patient recently diagnosed with left PCA territory infarct in October 2022, and rehospitalized with extension of previous CVA. Previous TTE revealed normal LV systolic function, saline test results negative. CT angiogram neck with bilateral nonobstructive carotid artery stenosis.  She completed recent follow-up with neurology approximately 10 days ago and was maintained on DAPT, atorvastatin.  She is scheduled for follow-up MRI and neurology follow-up within the next few weeks.  She was referred to [facility] heart and valve clinic for a cardiac event monitor.    Patient presents today feeling well overall from a cardiovascular standpoint. She continues to improve with her word finding and daily activities. She is compliant with DAPT, statin, cardiac medications. Denies any previous history of arrhythmia symptoms.  Continued visual change after recent CVA.  She denies chest pain, dyspnea, palpitations/tachypalpitation, near-syncope/syncope.      Objective     Vital Signs:   Vitals:    12/22/22 0956 12/22/22 0958 12/22/22 1000   BP: 127/67 117/66 115/67   BP Location: Right arm Left arm Left arm   Patient Position: Sitting Standing Sitting   Cuff Size: Adult Adult Adult   Pulse: 90 96 89   Resp:   20   Temp:   97.4 °F (36.3 °C)   TempSrc:   Temporal   SpO2: 99% 99% 98%   Weight:   82.1 kg (181 lb 1 oz)   Height:   157.5 cm (62.01\")     Body mass index is 33.11 kg/m².  Physical Exam  Vitals and nursing note reviewed.   Constitutional:       Appearance: Normal appearance.   HENT:      Head: " Normocephalic.   Eyes:      Extraocular Movements: Extraocular movements intact.   Neck:      Vascular: No carotid bruit.   Cardiovascular:      Rate and Rhythm: Normal rate and regular rhythm.      Pulses: Normal pulses.      Heart sounds: Normal heart sounds, S1 normal and S2 normal. No murmur heard.  Pulmonary:      Effort: Pulmonary effort is normal. No respiratory distress.      Breath sounds: Normal breath sounds.   Musculoskeletal:      Cervical back: Neck supple.      Right lower leg: No edema.      Left lower leg: No edema.   Skin:     General: Skin is warm and dry.   Neurological:      General: No focal deficit present.      Mental Status: She is alert.   Psychiatric:         Mood and Affect: Mood normal.         Behavior: Behavior normal.         Thought Content: Thought content normal.        Data Reviewed:{ Labs  Result Review  Imaging  Med Tab  Media :23}     Progress Notes by Irma Cain APRN (12/13/2022 08:30)  ECG 12 Lead ED Triage Standing Order; Chest Pain (11/14/2022 20:37)  Adult Transthoracic Echo Complete W/ Cont if Necessary Per Protocol (With Agitated Saline) (10/31/2022 17:38)      Assessment & Plan   Assessment and Plan {CC Problem List  Visit Diagnosis  ROS  Review (Popup)  Health Maintenance  Quality  BestPractice  Medications  SmartSets  SnapShot Encounters  Media :23}     1.  Recent CVA/thalamic stroke (HCC)  -Neurology notes possibly related to uncontrolled diabetes.  -TTE with negative saline test during hospitalization, CT angiogram neck with bilateral nonobstructive carotid artery stenosis.  -Compliant with medications after discharge  -Continue DAPT, statin  -Blood pressure controlled  -30-day MCOT to rule out atrial fibrillation  -Continue follow-up with neurology as scheduled  -Referred to [facility] cardiology for CVA follow-up/monitor results    2. Essential hypertension  -Well-controlled  -Continue losartan, amlodipine    3. Mixed  hyperlipidemia  -Continue atorvastatin 40 Mg daily    4. Bilateral carotid artery stenosis  -CT angiogram neck with bilateral carotid stenosis  -Continue DAPT, statin      Follow Up {Instructions Charge Capture  Follow-up Communications :23}     Return if symptoms worsen or fail to improve.      Patient was given instructions and counseling regarding her condition or for health maintenance advice. Please see specific information pulled into the AVS if appropriate.  Patient was instructed to call the [facility] with any questions, concerns, or worsening symptoms.    Dictated Utilizing Dragon Dictation   Please note that portions of this note were completed with a voice recognition program.   Part of this note may be an electronic transcription/translation of spoken language to printed text using the Dragon Dictation System.

## 2022-12-22 NOTE — TELEPHONE ENCOUNTER
LVM debby Low in [employer] that is alright to reschedule Ms. Patrick while waiting on getting the procedure authorized.

## 2022-12-22 NOTE — TELEPHONE ENCOUNTER
Caller: SOFI    Relationship: [facility]    Best call back number: [phone]    Who are you requesting to speak with (clinical staff, provider,  specific staff member): STROKE    What was the call regarding:   MRI ON 12-27-22 HAS NOT BEEN APPROVED YET. IF NOT APPROVED BY 3 pm 12-23-22, IS OK TO RESCHEDULE TO ALLOW TIME FOR THE AUTHORIZATION.    Do you require a callback: YES, PLEASE.

## 2022-12-22 NOTE — PROGRESS NOTES
[facility] Heart Monitor Documentation    Rachana Patrick  1975  [id number]  12/22/22      [] ZIO XT Patch  Model [id number] Prescribed for  Days    · Serial Number: (N + 9 Digits) N   · Apply-By Date on Box:   · USPS Tracking Number:   · USPS Tracking        [x] [employer] BodyGuardian MINI PLUS Mobile Cardiac Telemetry  Model BGMINIPLUS Prescribed for 30 Days    · Serial Number: (BGM + 7 Digits) [id number]  · Shipped-By Date on Box: 12/19/2022  · UPS Tracking Number: [id number]  · UPS Tracking      [] [employer] BodyGuardian MINI Holter Monitor  Model BGMINIEL Prescribed for  Days    · Serial Number: (7 Digits)   · Shipped-By Date on Box:   · UPS Tracking Number: [id number]  · UPS Tracking        This monitor was applied to the patient's chest and checked for proper functioning.  Ms. Rachana Patrick was instructed in the proper use of this monitor.  She was given the opportunity to ask questions and left the office with the device 's instruction manual.    Kenna Narayan, [facility], 10:13 EST, 12/22/22                  [facility] 8.8.2019

## 2022-12-27 ENCOUNTER — TELEPHONE (OUTPATIENT)
Dept: FAMILY MEDICINE CLINIC | Facility: CLINIC | Age: 47
End: 2022-12-27

## 2022-12-27 ENCOUNTER — HOSPITAL ENCOUNTER (OUTPATIENT)
Dept: MRI IMAGING | Facility: HOSPITAL | Age: 47
Discharge: HOME | End: 2022-12-27
Admitting: CLINICAL NURSE SPECIALIST

## 2022-12-27 DIAGNOSIS — R41.0 OCCASIONAL CONFUSION: ICD-10-CM

## 2022-12-27 DIAGNOSIS — Z86.73 HISTORY OF STROKE: ICD-10-CM

## 2022-12-27 PROCEDURE — 70551 MRI BRAIN STEM W/O DYE: CPT

## 2022-12-27 NOTE — TELEPHONE ENCOUNTER
Caller: Rachana Patrick    Relationship: Self    Best call back number: [phone]    What medication are you requesting: SINUS MEDICATION    What are your current symptoms: SINUS PAIN, CONGESTION, AND COUGH    How long have you been experiencing symptoms: A FEW DAYS     If a prescription is needed, what is your preferred pharmacy and phone number: [facility] [place], [place] - [address] - [phone]  - [phone]      Additional notes: THE PATIENT WOULD LIKE A PRESCRIPTION CALLED IN, IF POSSIBLE. PLEASE CALL TO ADVISE.    THANK YOU.

## 2022-12-28 ENCOUNTER — TELEPHONE (OUTPATIENT)
Dept: FAMILY MEDICINE CLINIC | Facility: CLINIC | Age: 47
End: 2022-12-28

## 2022-12-28 NOTE — TELEPHONE ENCOUNTER
Caller: BARRERA WETZEL  BOYFRIEND OF 10 YEARS     Relationship: Emergency Contact    Best call back number: [phone]     Who are you requesting to speak with (clinical staff, provider,  specific staff member): CLINICAL     What was the call regarding   BARRERA STATES THE PATIENT HAS DECLINED TAKING HER MEDICATIONS AND HAS NOT BEEN HERSELF TODAY   HER ATTITUDE IS DIFFERENT THAN THE LAST FEW MONTHS    HE IS REQUESTING A CALL FROM DR TIAN TO DISCUSS HER MEDICATIONS

## 2023-01-02 ENCOUNTER — NURSE TRIAGE (OUTPATIENT)
Dept: CALL CENTER | Facility: HOSPITAL | Age: 48
End: 2023-01-02
Payer: COMMERCIAL

## 2023-01-02 RX ORDER — DOXYCYCLINE 100 MG/1
100 CAPSULE ORAL EVERY 12 HOURS SCHEDULED
Qty: 20 CAPSULE | Refills: 0 | Status: SHIPPED | OUTPATIENT
Start: 2023-01-02 | End: 2023-01-12

## 2023-01-02 NOTE — TELEPHONE ENCOUNTER
Caller has had a recent CVA.  She was seen at a local  12/27 as she was not able to get an appt with PCP.  She was diagnosed with sinus infection and bronchitis.  She was prescribed amoxicillin in addition to stahist from Dr. Solorzano on the same day.  She is no better.  She is getting up thick brown sputum and has thick nasal drainage.  Secure message sent to on call provider.  Dr. Armas responds and will change abx, if no improvement call for appt or return to .  Caller notified of above.  Reason for Disposition  • Coughing up josue-colored (reddish-brown) sputum    Additional Information  • Negative: SEVERE difficulty breathing (e.g., struggling for each breath, speaks in single words)  • Negative: Bluish (or gray) lips or face now  • Negative: [1] Difficulty breathing AND [2] exposure to flames, smoke, or fumes  • Negative: [1] Stridor AND [2] difficulty breathing  • Negative: Sounds like a life-threatening emergency to the triager  • Negative: [1] Previous asthma attacks AND [2] this feels like asthma attack  • Negative: Dry cough (non-productive;  no sputum or minimal clear sputum)  • Negative: [1] MODERATE difficulty breathing (e.g., speaks in phrases, SOB even at rest, pulse 100-120) AND [2] still present when not coughing  • Negative: Chest pain  (Exception: MILD central chest pain, present only when coughing)  • Negative: Patient sounds very sick or weak to the triager  • Negative: [1] MILD difficulty breathing (e.g., minimal/no SOB at rest, SOB with walking, pulse <100) AND [2] still present when not coughing  • Negative: [1] Coughed up blood AND [2] > 1 tablespoon (15 ml) (Exception: blood-tinged sputum)  • Negative: Fever > 103 F (39.4 C)  • Negative: [1] Fever > 101 F (38.3 C) AND [2] age > 60 years  • Negative: [1] Fever > 100.0 F (37.8 C) AND [2] bedridden (e.g., nursing home patient, CVA, chronic illness, recovering from surgery)  • Negative: [1] Fever > 100.0 F (37.8 C) AND [2] diabetes mellitus  or weak immune system (e.g., HIV positive, cancer chemo, splenectomy, organ transplant, chronic steroids)  • Negative: Wheezing is present  • Negative: SEVERE coughing spells (e.g., whooping sound after coughing, vomiting after coughing)  • Negative: [1] Continuous (nonstop) coughing interferes with work or school AND [2] no improvement using cough treatment per Care Advice    Answer Assessment - Initial Assessment Questions  1. ONSET: \"When did the cough begin?\"       12/26  2. SEVERITY: \"How bad is the cough today?\"       Mod to severe  3. SPUTUM: \"Describe the color of your sputum\" (none, dry cough; clear, white, yellow, green)      brown  4. HEMOPTYSIS: \"Are you coughing up any blood?\" If so ask: \"How much?\" (flecks, streaks, tablespoons, etc.)      no  5. DIFFICULTY BREATHING: \"Are you having difficulty breathing?\" If Yes, ask: \"How bad is it?\" (e.g., mild, moderate, severe)     - MILD: No SOB at rest, mild SOB with walking, speaks normally in sentences, can lay down, no retractions, pulse < 100.     - MODERATE: SOB at rest, SOB with minimal exertion and prefers to sit, cannot lie down flat, speaks in phrases, mild retractions, audible wheezing, pulse 100-120.     - SEVERE: Very SOB at rest, speaks in single words, struggling to breathe, sitting hunched forward, retractions, pulse > 120       Ok unless coughing  6. FEVER: \"Do you have a fever?\" If Yes, ask: \"What is your temperature, how was it measured, and when did it start?\"      no  7. CARDIAC HISTORY: \"Do you have any history of heart disease?\" (e.g., heart attack, congestive heart failure)      Recent CVA  8. LUNG HISTORY: \"Do you have any history of lung disease?\"  (e.g., pulmonary embolus, asthma, emphysema)      Former smoker  9. PE RISK FACTORS: \"Do you have a history of blood clots?\" (or: recent major surgery, recent prolonged travel, bedridden)      Recent CVA  10. OTHER SYMPTOMS: \"Do you have any other symptoms?\" (e.g., runny nose, wheezing, chest  pain)        Thick nasal drainage, sore throat  11. PREGNANCY: \"Is there any chance you are pregnant?\" \"When was your last menstrual period?\"        no  12. TRAVEL: \"Have you traveled out of the country in the last month?\" (e.g., travel history, exposures)        no    Protocols used: COUGH - ACUTE PRODUCTIVE-ADULT-AH

## 2023-01-13 ENCOUNTER — OFFICE VISIT (OUTPATIENT)
Dept: NEUROLOGY | Facility: CLINIC | Age: 48
End: 2023-01-13
Payer: COMMERCIAL

## 2023-01-13 VITALS
WEIGHT: 179.4 LBS | HEART RATE: 102 BPM | TEMPERATURE: 98.2 F | BODY MASS INDEX: 33.01 KG/M2 | HEIGHT: 62 IN | OXYGEN SATURATION: 97 % | DIASTOLIC BLOOD PRESSURE: 62 MMHG | SYSTOLIC BLOOD PRESSURE: 126 MMHG

## 2023-01-13 DIAGNOSIS — Z86.73 HISTORY OF STROKE: Primary | ICD-10-CM

## 2023-01-13 PROCEDURE — 99214 OFFICE O/P EST MOD 30 MIN: CPT | Performed by: NURSE PRACTITIONER

## 2023-01-13 RX ORDER — ASPIRIN 325 MG
325 TABLET ORAL DAILY
Qty: 100 TABLET | Refills: 3 | Status: SHIPPED | OUTPATIENT
Start: 2023-01-13 | End: 2024-01-13

## 2023-01-13 NOTE — PROGRESS NOTES
Follow Up Office Visit      Encounter Date: 2023   Patient Name: Rachana Patrick  : 1975   MRN: [id number]   PCP: Jerel Agudelo MD    Chief Complaint:    Chief Complaint   Patient presents with   • Follow-up       History of Present Illness:   Initial visit: Rachana Patrick is a 47 y.o. female  with a PMH significant for LPCA stroke (BHR 10/22/2022, right eye blurred vision), bilateral carotid disease, HTN, HLD,T2DM (uncontrolled), tobacco abuse, and marijuana use who presents to the [facility] ED on 10/31/2022 with c/o global severe headache 10/10 and blurred vision in her right eye.   She has had blurred vision in her right eye since her stroke on 10/22. There was an initial delay in imaging as patient is agitated and did not initially want an IV started. Discussed with her at length the need for advanced imaging and she voiced understanding. Migraine cocktail ordered. CT head negative for hemorrhage, recent left thalamic  infarct noted.  CTA H/N with bilateral carotid disease.  No LVO.  Diminutive right vertebral artery.   MRI revealed some extension of her recent L PCA stroke.  She was given a migraine cocktail with improvement in headache symptoms.  TTE with no PFO, no LAE.  NIH at discharge was 1.  She was discharged on DAPT with aspirin and Plavix as well as high-dose statin/fenofibrate.  She was ordered to follow-up with  ophthalmology for driving clearance.  On 2022, she presented to [facility] ED  with chest pain and confusion.  CT of the head revealed a subtle 8 mm area of hyperdensity in the left thalamus concerning for possible hemorrhage within her previous stroke bed.  Area of suspected hemorrhage was less apparent on repeat CT 11/15.  Patient was also noted to have leukocytosis with WBCs of 28,000.  Cultures  were ordered. Per her significant other, she was noted to have intermittent confusion since her stroke.  Toxicology screen was positive for THC.  She was admitted to the  ICU for further management.  Unfortunately, the patient left AMA prior to obtaining MRI and obtaining cultures.      Last visit 12/13/2022: Per review of medical record, the patient had been started on Lyrica by her PCP.  Per her family, she had increased confusion since initiation of the medication.  She followed up with her PCP on 11/29/2022 with orders to discontinue Lyrica until the patient was evaluated today.  Patient reports he continues she continues to have LHH.  She continues with intermittent bouts of confusion and memory issues as well as intermittent coordination difficulties.  She did not discontinue her Lyrica.  She reports significant diabetic neuropathy in her feet.  Patient reports that she had been on it for years and tolerated it well prior to seeing her new PCP in November.  She reports that her blood pressure has been doing okay at home.  Blood sugars have been greater than 200.  She is due to follow-up with ophthalmology in February.  NIH 1 today for LHH.  GCS is 15.  She reports that she has been compliant with her aspirin, Plavix, and high-dose statin.      Today's visit, 1/13/23:   Patient is having a lot of trouble with concentration during appointment. She reports feeling anxious. She reports she has panic attacks when she goes to the grocery store. She is scheduled to be evaluated by her eye doctor to see if she can be cleared to drive. This is her number one concern at this time. She has no other questions or concerns. Reviewed plan to transition to  mg monotherapy and patient verbalized understanding.     Subjective        I have reviewed and the following portions of the patient's history were updated as appropriate: past family history, past medical history, past social history, past surgical history and problem list.    Medications:     Current Outpatient Medications:   •  albuterol sulfate  (90 Base) MCG/ACT inhaler, INHALE 2 PUFFS PO Q 6 H PRN, Disp: , Rfl: 0  •   amLODIPine (Norvasc) 10 MG tablet, Take 1 tablet by mouth Daily., Disp: 30 tablet, Rfl: 0  •  atorvastatin (Lipitor) 40 MG tablet, Take 1 tablet by mouth Daily., Disp: 30 tablet, Rfl: 11  •  fenofibrate 160 MG tablet, TK 1 T PO QD WITH A MEAL, Disp: , Rfl:   •  glucose blood test strip, 1 each by Other route 4 (Four) Times a Day., Disp: 400 each, Rfl: 3  •  glucose monitor monitoring kit, 1 each 4 (Four) Times a Day., Disp: 1 each, Rfl: 0  •  HumaLOG KwikPen 100 UNIT/ML solution pen-injector, 12-15 units with meals plus 1:50>150, MDD 60 units, Disp: 60 mL, Rfl: 1  •  Insulin Glargine (BASAGLAR KWIKPEN) 100 UNIT/ML injection pen, Inject 45 Units under the skin into the appropriate area as directed Every Night., Disp: 45 mL, Rfl: 1  •  Jardiance 25 MG tablet tablet, Take 1 tablet by mouth Daily., Disp: 30 tablet, Rfl: 11  •  Lancets (OneTouch Delica Plus [id number]) misc, 1 each by Other route 4 (Four) Times a Day., Disp: 400 each, Rfl: 3  •  losartan (COZAAR) 50 MG tablet, Take 1 tablet by mouth Daily. Hold unless SBP > 160, Disp: , Rfl:   •  mupirocin (BACTROBAN) 2 % ointment, , Disp: , Rfl:   •  polyethylene glycol (MiraLax) 17 GM/SCOOP powder, Take 17 g by mouth Daily., Disp: 850 g, Rfl: 2  •  pregabalin (LYRICA) 300 MG capsule, Take 1 capsule by mouth 2 (Two) Times a Day., Disp: 60 capsule, Rfl: 2  •  Semaglutide,0.25 or 0.5MG/DOS, (Ozempic, 0.25 or 0.5 MG/DOSE,) 2 MG/1.5ML solution pen-injector, Inject 0.25 mg under the skin into the appropriate area as directed 1 (One) Time Per Week. Increase to 0.5 mg weekly after 4 weeks, Disp: 1.5 mL, Rfl: 1  •  aspirin (Laura Aspirin) 325 MG tablet, Take 1 tablet by mouth Daily., Disp: 100 tablet, Rfl: 3  •  Chlorcyclizine-Pseudoephed 25-60 MG tablet, 1/2-1 po q 8 hours PRN, Disp: 30 tablet, Rfl: 1  •  Continuous Blood Gluc Sensor (FreeStyle Tyson 3 Sensor) misc, 1 each Every 14 (Fourteen) Days., Disp: 2 each, Rfl: 11    Allergies:   Allergies   Allergen Reactions   •  "Metformin GI Intolerance       Objective     Physical Exam:   Vital Signs:   Vitals:    01/13/23 1438   BP: 126/62   Pulse: 102   Temp: 98.2 °F (36.8 °C)   SpO2: 97%   Weight: 81.4 kg (179 lb 6.4 oz)   Height: 157.5 cm (62.01\")     Body mass index is 32.8 kg/m².    Physical Exam  Vitals and nursing note reviewed.   Constitutional:       General: She is awake. She is not in acute distress.     Appearance: Normal appearance. She is normal weight. She is not ill-appearing.   HENT:      Head: Normocephalic and atraumatic.      Nose: Nose normal.      Mouth/Throat:      Mouth: Mucous membranes are moist.   Eyes:      General: Lids are normal.      Extraocular Movements: Extraocular movements intact.      Pupils: Pupils are equal, round, and reactive to light.   Cardiovascular:      Rate and Rhythm: Normal rate and regular rhythm.      Pulses: Normal pulses.   Pulmonary:      Effort: Pulmonary effort is normal. No respiratory distress.   Skin:     General: Skin is warm and dry.   Neurological:      General: No focal deficit present.      Mental Status: She is alert and oriented to person, place, and time.      Motor: Motor strength is normal.      Coordination: Coordination is intact.      Comments: Can count fingers in all quadrants but reports R visual field loss   Psychiatric:         Mood and Affect: Mood normal.         Speech: Speech normal.         Behavior: Behavior normal.       Neurological Exam  Mental Status  Awake and alert. Oriented to person, place, time and situation. Oriented to person, place, and time. Speech is normal. Language is fluent with no aphasia. Attention and concentration: Trouble with concentration . Fund of knowledge is appropriate for level of education.    Cranial Nerves  CN II: Right homonymous hemianopsia.  CN III, IV, VI: Extraocular movements intact bilaterally. Normal lids and orbits bilaterally. Pupils equal round and reactive to light bilaterally.  CN V: Facial sensation is " normal.  CN VII: Full and symmetric facial movement.  CN VIII: Hearing is normal to speech .    Motor  Normal muscle bulk throughout. No fasciculations present. Normal muscle tone. Strength is 5/5 throughout all four extremities.    Sensory  Light touch is normal in upper and lower extremities.     Coordination    Finger-to-nose, rapid alternating movements and heel-to-shin normal bilaterally without dysmetria.  No obvious dysmetria .    Gait  Casual gait is normal including stance, stride, and arm swing.       Modified Tiff Score: 2        0  No Symptoms    1 No significant disability. Able to carry out all usual activities, despite some symptoms.    2 Slight disability. Able to look after own affairs without assistance, but unable to carry out all previous activities.    3 Moderate disability. Requires some help, but able to walk unassisted.    4 Moderately severe disability. Unable to attend to own bodily needs without assistance, and unable to walk unassisted.    5 Severe disability. Requires constant nursing care and attention, bedridden, incontinent.    6 Dead      PHQ-9 Depression Screening  Little interest or pleasure in doing things? 0-->not at all   Feeling down, depressed, or hopeless? 3-->nearly every day   Trouble falling or staying asleep, or sleeping too much? 3-->nearly every day   Feeling tired or having little energy? 3-->nearly every day   Poor appetite or overeating? 1-->several days   Feeling bad about yourself - or that you are a failure or have let yourself or your family down? 0-->not at all   Trouble concentrating on things, such as reading the newspaper or watching television? 1-->several days   Moving or speaking so slowly that other people could have noticed? Or the opposite - being so fidgety or restless that you have been moving around a lot more than usual? 3-->nearly every day   Thoughts that you would be better off dead, or of hurting yourself in some way? 0-->not at all   PHQ-9  Total Score 14   If you checked off any problems, how difficult have these problems made it for you to do your work, take care of things at home, or get along with other people? somewhat difficult (patient states because her eyes are crappy)           Assessment / Plan      Assessment/Plan:    1. History of stroke (Primary)  -Recent left PCA territory infarct in October.  She was discharged home on DAPT with aspirin and Plavix for 90 days as well as high-dose statin and fenofibrate.   -Admitted to [facility] on 10/31/2022 with severe headache.  MRI revealed an extension of her previous L PCA stroke.  She was discharged home on DAPT and statin as well as fenofibrate.  -Admitted to [facility] on 11/14 with subtle area of hyperdensity in her previous stroke bed.  Less apparent on repeat CT head on 11/15.  Patient left AMA prior to completion of her work-up and obtaining MRI.  -Per plan from last visit, we will discontinue Plavix and transition to  mg monotherapy today. Wrote these instructions out for patient as she was having some concentration issues.   -Cardiac event monitor was worn but unsure if patient completed properly per instructions. She has still not returned the monitor for review  -MRI brain done last appointment due to ongoing memory issues. It showed expected evolutionary changes of the previously noted multifocal acute  infarcts on the left. No evidence of acute ischemia, interval hemorrhage  or new mass effect.  -No driving until ophthalmology follow-up  -follow up in stroke clinic in 3 months      2. Hyperlipidemia LDL goal <70  -LDL 43 on 11/15/2022, total cholesterol 92.  Triglycerides 103.   -Continue Lipitor to 40 mg nightly.  Continue fenofibrate  -labs per PCP or can be drawn at our next visit      3. Type 2 diabetes mellitus   -Hemoglobin A1c 9.5 on 11/15/2022  -Uncontrolled  -Goal serum glucose less than 140  -Ambulatory for referral to endocrinology     4. Essential hypertension  -Goal BP less than  130/80  -today's /62     5. Tobacco abuse  -patient reports she quit smoking last fall      6.  Confusion  -patient denies using any illicit substances or non-prescribed medications but she does appear to have significant concentration issues during our visit  -MRI brain was done at last visit for memory concerns and was stable        Discussed the importance of medication compliance and lifestyle modifications (adequate blood pressure control, adequate control of hyperlipidemia, adequate glycemic control, increase physical activity, and healthy diet) to help reduce the risk of future cerebrovascular events.  Also discussed the signs symptoms that would warrant the patient return back to the emergency department including unilateral weakness, unilateral numbness, visual disturbances, loss of balance, speech difficulties, and/or a sudden severe headache.       Follow Up:   Return in about 3 months (around 4/13/2023).    LAZARA Dover  [facility]

## 2023-01-31 ENCOUNTER — OFFICE VISIT (OUTPATIENT)
Dept: FAMILY MEDICINE CLINIC | Facility: CLINIC | Age: 48
End: 2023-01-31
Payer: COMMERCIAL

## 2023-01-31 VITALS
SYSTOLIC BLOOD PRESSURE: 122 MMHG | OXYGEN SATURATION: 98 % | RESPIRATION RATE: 18 BRPM | TEMPERATURE: 97.7 F | HEART RATE: 98 BPM | WEIGHT: 180.2 LBS | DIASTOLIC BLOOD PRESSURE: 70 MMHG | BODY MASS INDEX: 32.95 KG/M2

## 2023-01-31 DIAGNOSIS — K59.04 CHRONIC IDIOPATHIC CONSTIPATION: ICD-10-CM

## 2023-01-31 DIAGNOSIS — R22.0 SUPERFICIAL SWELLING OF SCALP: Primary | ICD-10-CM

## 2023-01-31 PROCEDURE — 99213 OFFICE O/P EST LOW 20 MIN: CPT | Performed by: FAMILY MEDICINE

## 2023-01-31 RX ORDER — POLYETHYLENE GLYCOL 3350 17 G/17G
34 POWDER, FOR SOLUTION ORAL DAILY
Qty: 850 G | Refills: 2
Start: 2023-01-31

## 2023-01-31 NOTE — PROGRESS NOTES
Chief Complaint   Patient presents with   • spot on head     Left side of head, ongoing for a couple months        Subjective      Rachana Patrick is a 47 y.o. who presents for 2 reasons.    Yesterday patient developed a swollen lesion on the left side of the scalp just above the anterior ear.  Lesion has nearly resolved today but area remains tender.    Patient continues to suffer from constipation.  She has used suppositories, senna tea, OTC cathartics, and MiraLAX daily and reports she is still only having a bowel movement once every 3 to 4 weeks.    Objective   Vital Signs:  /70 (BP Location: Left arm, Patient Position: Sitting, Cuff Size: Adult)   Pulse 98   Temp 97.7 °F (36.5 °C) (Infrared)   Resp 18   Wt 81.7 kg (180 lb 3.2 oz)   SpO2 98%   BMI 32.95 kg/m²     Physical Exam  Abdominal:      General: Abdomen is flat. Bowel sounds are normal.      Palpations: Abdomen is soft.   Skin:     Comments: No obvious lesion.  There may be a palpable area of swelling versus a small cyst on the anterior left scalp   Neurological:      Mental Status: She is alert.          Result Review                     Assessment and Plan  Diagnoses and all orders for this visit:    1. Superficial swelling of scalp (Primary)  Comments:  Use ice prn and return if the lesion does not resolve    2. Chronic idiopathic constipation  Comments:  Decrease MiraLAX to once daily.  Add Linzess 72 mcg daily.  Samples and prescription written  Orders:  -     polyethylene glycol (MiraLax) 17 GM/SCOOP powder; Take 34 g by mouth Daily.  Dispense: 850 g; Refill: 2  -     linaclotide (Linzess) 72 MCG capsule capsule; Take 1 capsule by mouth Every Morning Before Breakfast.  Dispense: 30 capsule; Refill: 2            Follow Up  No follow-ups on file.  Patient was given instructions and counseling regarding her condition or for health maintenance advice. Please see specific information pulled into the AVS if appropriate.

## 2023-02-08 PROCEDURE — 93272 ECG/REVIEW INTERPRET ONLY: CPT | Performed by: INTERNAL MEDICINE

## 2023-02-10 ENCOUNTER — TELEPHONE (OUTPATIENT)
Dept: FAMILY MEDICINE CLINIC | Facility: CLINIC | Age: 48
End: 2023-02-10
Payer: COMMERCIAL

## 2023-02-10 NOTE — TELEPHONE ENCOUNTER
Caller: Rachana Patrick    Relationship to patient: Self    Best call back number: [phone]    Characteristics of symptom/severity: URGENCY, PRESSURE WHEN URINATING    How long have you been experiencing symptoms: FOR SOME TIME    When have you experienced or been treated for these symptoms before:     Have you had any recent surgeries, procedures or injections: [] Yes  [x] No    Is it the symptoms constant or intermittent: [x] Constant  [] Intermittent     What therapies/medications have you tried: NOTHING    If a prescription is needed, what is your preferred pharmacy:   [facility] [place], [place] - [address] [phone]  - [phone]   [address]  [address] [address]  Phone: [phone] Fax: [phone]

## 2023-02-10 NOTE — TELEPHONE ENCOUNTER
Pt stated she was in the hospital 2 months ago for stroke and had UTI and was given antibiotics but the issue got better then came back and worsened-no burning or fever-back pain left side, urgency then has to force herself to urinate-urine yellow and cloudy

## 2023-02-13 ENCOUNTER — TELEPHONE (OUTPATIENT)
Dept: FAMILY MEDICINE CLINIC | Facility: CLINIC | Age: 48
End: 2023-02-13
Payer: COMMERCIAL

## 2023-02-13 DIAGNOSIS — E11.40 TYPE 2 DIABETES MELLITUS WITH DIABETIC NEUROPATHY, WITH LONG-TERM CURRENT USE OF INSULIN: ICD-10-CM

## 2023-02-13 DIAGNOSIS — Z79.4 TYPE 2 DIABETES MELLITUS WITH DIABETIC NEUROPATHY, WITH LONG-TERM CURRENT USE OF INSULIN: ICD-10-CM

## 2023-02-13 DIAGNOSIS — Z79.4 TYPE 2 DIABETES MELLITUS WITH HYPERGLYCEMIA, WITH LONG-TERM CURRENT USE OF INSULIN: ICD-10-CM

## 2023-02-13 DIAGNOSIS — E11.65 TYPE 2 DIABETES MELLITUS WITH HYPERGLYCEMIA, WITH LONG-TERM CURRENT USE OF INSULIN: ICD-10-CM

## 2023-02-13 RX ORDER — PREGABALIN 300 MG/1
300 CAPSULE ORAL 2 TIMES DAILY
Qty: 60 CAPSULE | Refills: 5 | Status: SHIPPED | OUTPATIENT
Start: 2023-02-13

## 2023-02-13 RX ORDER — LOSARTAN POTASSIUM 50 MG/1
50 TABLET ORAL DAILY
Qty: 30 TABLET | Refills: 5
Start: 2023-02-13 | End: 2023-02-13 | Stop reason: SDUPTHER

## 2023-02-13 RX ORDER — AMLODIPINE BESYLATE 10 MG/1
10 TABLET ORAL DAILY
Qty: 30 TABLET | Refills: 5 | Status: SHIPPED | OUTPATIENT
Start: 2023-02-13

## 2023-02-13 RX ORDER — LOSARTAN POTASSIUM 50 MG/1
50 TABLET ORAL DAILY
Qty: 30 TABLET | Refills: 5 | Status: SHIPPED | OUTPATIENT
Start: 2023-02-13 | End: 2023-02-14 | Stop reason: SDUPTHER

## 2023-02-13 NOTE — TELEPHONE ENCOUNTER
The dosing has been changed in her chart.  I suspect this occurred during a hospitalization and her dose was reduced from 100 mg to 50 mg.  So I need to know what she has been taking on her current bottle of losartan only.  Second, blood pressure machines can be bought over-the-counter.  I would recommend purchasing a home cuff and not a wrist cuff.  Third, because of all the confusion surrounding her medications I would suggest we only refill medications at her office visits.

## 2023-02-13 NOTE — TELEPHONE ENCOUNTER
[facility] called and said, pt is there requesting the Losartan Rx. They have been filling 100mg for quite sometime now but this has 50mg on file and you did not send it over for her, it was on print not regular send.

## 2023-02-13 NOTE — TELEPHONE ENCOUNTER
Caller: Rachana Patrick    Relationship: Self    Best call back number: [phone]    What medications are you currently taking:   Current Outpatient Medications on File Prior to Visit   Medication Sig Dispense Refill   • albuterol sulfate  (90 Base) MCG/ACT inhaler INHALE 2 PUFFS PO Q 6 H PRN  0   • amLODIPine (Norvasc) 10 MG tablet Take 1 tablet by mouth Daily. 30 tablet 5   • aspirin (Laura Aspirin) 325 MG tablet Take 1 tablet by mouth Daily. 100 tablet 3   • atorvastatin (Lipitor) 40 MG tablet Take 1 tablet by mouth Daily. 30 tablet 11   • Chlorcyclizine-Pseudoephed 25-60 MG tablet 1/2-1 po q 8 hours PRN 30 tablet 1   • Continuous Blood Gluc Sensor ([employer] TechnologiesStyle Tyson 3 Sensor) misc 1 each Every 14 (Fourteen) Days. 2 each 11   • fenofibrate 160 MG tablet TK 1 T PO QD WITH A MEAL     • glucose blood test strip 1 each by Other route 4 (Four) Times a Day. 400 each 3   • glucose monitor monitoring kit 1 each 4 (Four) Times a Day. 1 each 0   • HumaLOG KwikPen 100 UNIT/ML solution pen-injector 12-15 units with meals plus 1:50>150, MDD 60 units 60 mL 1   • Insulin Glargine (BASAGLAR KWIKPEN) 100 UNIT/ML injection pen Inject 45 Units under the skin into the appropriate area as directed Every Night. 45 mL 1   • Jardiance 25 MG tablet tablet Take 1 tablet by mouth Daily. 30 tablet 11   • Lancets (OneTouch Delica Plus [id number]) misc 1 each by Other route 4 (Four) Times a Day. 400 each 3   • linaclotide (Linzess) 72 MCG capsule capsule Take 1 capsule by mouth Every Morning Before Breakfast. 30 capsule 2   • losartan (COZAAR) 50 MG tablet Take 1 tablet by mouth Daily. Hold unless SBP > 160 30 tablet 5   • mupirocin (BACTROBAN) 2 % ointment      • polyethylene glycol (MiraLax) 17 GM/SCOOP powder Take 34 g by mouth Daily. 850 g 2   • pregabalin (LYRICA) 300 MG capsule Take 1 capsule by mouth 2 (Two) Times a Day. 60 capsule 5   • Semaglutide,0.25 or 0.5MG/DOS, (Ozempic, 0.25 or 0.5 MG/DOSE,) 2 MG/1.5ML solution  pen-injector Inject 0.25 mg under the skin into the appropriate area as directed 1 (One) Time Per Week. Increase to 0.5 mg weekly after 4 weeks 1.5 mL 1   • [DISCONTINUED] amLODIPine (Norvasc) 10 MG tablet Take 1 tablet by mouth Daily. 30 tablet 0   • [DISCONTINUED] losartan (COZAAR) 50 MG tablet Take 1 tablet by mouth Daily. Hold unless SBP > 160     • [DISCONTINUED] losartan (COZAAR) 50 MG tablet Take 1 tablet by mouth Daily. Hold unless SBP > 160 30 tablet 5   • [DISCONTINUED] pregabalin (LYRICA) 300 MG capsule Take 1 capsule by mouth 2 (Two) Times a Day. 60 capsule 2     No current facility-administered medications on file prior to visit.     What are your concerns: PT CALLED STATED THAT SHE WAS NOT AWARE THAT RX LOSARTAN WAS GOING TO BE REDUCED, AND WILL LIKE TO KNOW WHY RX DOSAGE HAS CHANGED AND STATED THAT SHE WAS TOLD TO CHECK BLOOD PRESSURE BUT SHE DOES NOT HAVE ANYTHING TO CHECK IT WITH.

## 2023-02-13 NOTE — TELEPHONE ENCOUNTER
Caller: Rachana Patrick    Relationship: Self    Best call back number: [phone]    Requested Prescriptions: LEVEMIR     Requested Prescriptions     Pending Prescriptions Disp Refills   • amLODIPine (Norvasc) 10 MG tablet 30 tablet 0     Sig: Take 1 tablet by mouth Daily.   • losartan (COZAAR) 50 MG tablet       Sig: Take 1 tablet by mouth Daily. Hold unless SBP > 160   • pregabalin (LYRICA) 300 MG capsule 60 capsule 2     Sig: Take 1 capsule by mouth 2 (Two) Times a Day.          Pharmacy where request should be sent: [address] - [phone]  - [phone] FX        Additional details provided by patient: COMPLETELY OUT. ALSO REQUESTING LEVEMIR DID NOT SEE ON MED LIST    Does the patient have less than a 3 day supply:  [x] Yes  [] No    Would you like a call back once the refill request has been completed: [x] Yes [] No    If the office needs to give you a call back, can they leave a voicemail: [x] Yes [] No    Pj Rojas Rep   02/13/23 10:51 EST

## 2023-02-14 RX ORDER — LOSARTAN POTASSIUM 100 MG/1
100 TABLET ORAL DAILY
Qty: 90 TABLET | Refills: 1 | Status: SHIPPED | OUTPATIENT
Start: 2023-02-14

## 2023-02-16 DIAGNOSIS — Z79.4 TYPE 2 DIABETES MELLITUS WITH HYPERGLYCEMIA, WITH LONG-TERM CURRENT USE OF INSULIN: ICD-10-CM

## 2023-02-16 DIAGNOSIS — E11.65 TYPE 2 DIABETES MELLITUS WITH HYPERGLYCEMIA, WITH LONG-TERM CURRENT USE OF INSULIN: ICD-10-CM

## 2023-02-16 RX ORDER — SEMAGLUTIDE 1.34 MG/ML
0.5 INJECTION, SOLUTION SUBCUTANEOUS WEEKLY
Qty: 1.5 ML | Refills: 2 | Status: SHIPPED | OUTPATIENT
Start: 2023-02-16 | End: 2023-04-06 | Stop reason: ALTCHOICE

## 2023-02-17 DIAGNOSIS — E11.65 TYPE 2 DIABETES MELLITUS WITH HYPERGLYCEMIA, WITH LONG-TERM CURRENT USE OF INSULIN: ICD-10-CM

## 2023-02-17 DIAGNOSIS — Z79.4 TYPE 2 DIABETES MELLITUS WITH HYPERGLYCEMIA, WITH LONG-TERM CURRENT USE OF INSULIN: ICD-10-CM

## 2023-02-17 RX ORDER — SEMAGLUTIDE 1.34 MG/ML
0.5 INJECTION, SOLUTION SUBCUTANEOUS WEEKLY
Qty: 1.5 ML | Refills: 2 | OUTPATIENT
Start: 2023-02-17

## 2023-02-22 ENCOUNTER — TELEPHONE (OUTPATIENT)
Dept: ENDOCRINOLOGY | Facility: CLINIC | Age: 48
End: 2023-02-22

## 2023-02-22 DIAGNOSIS — Z79.4 TYPE 2 DIABETES MELLITUS WITH HYPERGLYCEMIA, WITH LONG-TERM CURRENT USE OF INSULIN: Primary | ICD-10-CM

## 2023-02-22 DIAGNOSIS — E11.65 TYPE 2 DIABETES MELLITUS WITH HYPERGLYCEMIA, WITH LONG-TERM CURRENT USE OF INSULIN: Primary | ICD-10-CM

## 2023-02-22 NOTE — TELEPHONE ENCOUNTER
Pt called. She said dr corbin prescribed basaglar, but insurance won't cover it.    Pt requests we call in levemir- pt has taken it in the past and it was prescribed by dr walton.    Pt does not know If levemir is covered by insurance. She can't remember when she last filled it, but she has been taking it.    Call [facility] to see which insulin is covered by her insurance then send it in. Pt is out of insulin:    [facility]:  Phone: [phone] Fax: [phone]      Call pt can leave message  [phone]

## 2023-02-22 NOTE — TELEPHONE ENCOUNTER
Looks like lantus may be covered instead. Would prefer this but I asked pharmacy to fill any covered in class. Levemir would be OK but not my preferred option if lantus covered.

## 2023-03-01 ENCOUNTER — TELEPHONE (OUTPATIENT)
Dept: ENDOCRINOLOGY | Facility: CLINIC | Age: 48
End: 2023-03-01

## 2023-03-01 DIAGNOSIS — Z79.4 TYPE 2 DIABETES MELLITUS WITH HYPERGLYCEMIA, WITH LONG-TERM CURRENT USE OF INSULIN: Primary | ICD-10-CM

## 2023-03-01 DIAGNOSIS — E11.65 TYPE 2 DIABETES MELLITUS WITH HYPERGLYCEMIA, WITH LONG-TERM CURRENT USE OF INSULIN: Primary | ICD-10-CM

## 2023-03-01 RX ORDER — INSULIN DETEMIR 100 [IU]/ML
20 INJECTION, SOLUTION SUBCUTANEOUS 2 TIMES DAILY
Qty: 45 ML | Refills: 1 | Status: SHIPPED | OUTPATIENT
Start: 2023-03-01 | End: 2023-04-06 | Stop reason: SDUPTHER

## 2023-03-01 NOTE — TELEPHONE ENCOUNTER
PATIENT IS CALLING STATING HER BLOOD SUGARS ARE RUNNING HIGH OVER 300 ON LANTUS MEDICATION. SHE IS WANTING TO GO BACK TO LEVEMIER WHICH WORKS BETTER FOR HER. SHE IS GETTING HEADACHES TAKING LANTUS AND NOW HIGH BLOOD SUGARS. SHE WOULD LIKE TO HAVE A PRESCRIPTION FOR LEVEMIER. PHONE NUMBER [phone]

## 2023-03-01 NOTE — TELEPHONE ENCOUNTER
Levemir will need to be dosed twice daily (it doesn't last 24 hrs like lantus/basaglar).     Recommend 20 units twice daily. Script sent.

## 2023-03-01 NOTE — TELEPHONE ENCOUNTER
PT RETURNED OUR CALL. I READ HER THE NOTE FROM JUAQUIN AND INSTRUCTED THE PT TO CALL US IF SHE HAS FURTHER Q's.

## 2023-03-24 ENCOUNTER — TELEPHONE (OUTPATIENT)
Dept: INTERNAL MEDICINE | Facility: CLINIC | Age: 48
End: 2023-03-24
Payer: COMMERCIAL

## 2023-03-24 NOTE — TELEPHONE ENCOUNTER
[facility] called letting us know that the PT will need   Bectdon Natalya pen needles, she has previously had 100 units per box for her insulin.

## 2023-03-24 NOTE — TELEPHONE ENCOUNTER
Patient's diabetes is managed by endocrinology.  The pharmacy needs to contact her endocrinologist

## 2023-03-27 DIAGNOSIS — Z79.4 TYPE 2 DIABETES MELLITUS WITH HYPERGLYCEMIA, WITH LONG-TERM CURRENT USE OF INSULIN: ICD-10-CM

## 2023-03-27 DIAGNOSIS — E11.65 TYPE 2 DIABETES MELLITUS WITH HYPERGLYCEMIA, WITH LONG-TERM CURRENT USE OF INSULIN: ICD-10-CM

## 2023-03-27 RX ORDER — PEN NEEDLE, DIABETIC 31 G X1/4"
NEEDLE, DISPOSABLE MISCELLANEOUS
Qty: 150 EACH | Refills: 11 | Status: SHIPPED | OUTPATIENT
Start: 2023-03-27 | End: 2023-04-06

## 2023-04-04 ENCOUNTER — OFFICE VISIT (OUTPATIENT)
Dept: FAMILY MEDICINE CLINIC | Facility: CLINIC | Age: 48
End: 2023-04-04
Payer: COMMERCIAL

## 2023-04-04 VITALS
HEIGHT: 62 IN | SYSTOLIC BLOOD PRESSURE: 112 MMHG | DIASTOLIC BLOOD PRESSURE: 64 MMHG | WEIGHT: 175.4 LBS | BODY MASS INDEX: 32.28 KG/M2 | HEART RATE: 92 BPM | TEMPERATURE: 97.7 F | RESPIRATION RATE: 18 BRPM | OXYGEN SATURATION: 98 %

## 2023-04-04 DIAGNOSIS — J01.00 ACUTE NON-RECURRENT MAXILLARY SINUSITIS: Primary | ICD-10-CM

## 2023-04-04 DIAGNOSIS — R05.1 ACUTE COUGH: ICD-10-CM

## 2023-04-04 DIAGNOSIS — J20.9 ACUTE BRONCHITIS, UNSPECIFIED ORGANISM: ICD-10-CM

## 2023-04-04 LAB
EXPIRATION DATE: NORMAL
FLUAV AG UPPER RESP QL IA.RAPID: NOT DETECTED
FLUBV AG UPPER RESP QL IA.RAPID: NOT DETECTED
INTERNAL CONTROL: NORMAL
Lab: NORMAL
SARS-COV-2 AG UPPER RESP QL IA.RAPID: NOT DETECTED

## 2023-04-04 PROCEDURE — 3074F SYST BP LT 130 MM HG: CPT | Performed by: FAMILY MEDICINE

## 2023-04-04 PROCEDURE — 99213 OFFICE O/P EST LOW 20 MIN: CPT | Performed by: FAMILY MEDICINE

## 2023-04-04 PROCEDURE — 3078F DIAST BP <80 MM HG: CPT | Performed by: FAMILY MEDICINE

## 2023-04-04 PROCEDURE — 87428 SARSCOV & INF VIR A&B AG IA: CPT | Performed by: FAMILY MEDICINE

## 2023-04-04 RX ORDER — AMOXICILLIN AND CLAVULANATE POTASSIUM 875; 125 MG/1; MG/1
1 TABLET, FILM COATED ORAL 2 TIMES DAILY
Qty: 14 TABLET | Refills: 0 | Status: SHIPPED | OUTPATIENT
Start: 2023-04-04

## 2023-04-04 RX ORDER — GENTAMICIN SULFATE 1 MG/G
OINTMENT TOPICAL
COMMUNITY
Start: 2023-02-22

## 2023-04-04 RX ORDER — CYCLOBENZAPRINE HCL 10 MG
TABLET ORAL
COMMUNITY
Start: 2023-02-07

## 2023-04-04 RX ORDER — KETOCONAZOLE 20 MG/G
CREAM TOPICAL
COMMUNITY
Start: 2023-02-22

## 2023-04-04 RX ORDER — DEXTROMETHORPHAN HYDROBROMIDE AND PROMETHAZINE HYDROCHLORIDE 15; 6.25 MG/5ML; MG/5ML
5 SYRUP ORAL 4 TIMES DAILY PRN
Qty: 180 ML | Refills: 0 | Status: SHIPPED | OUTPATIENT
Start: 2023-04-04

## 2023-04-04 NOTE — PROGRESS NOTES
"Chief Complaint   Patient presents with   • Cough     Productive cough with \"dirty\" colored sputum, sinus congestion and pressure, knot on right side of jaw, fatigued, can't sleep because of the sinus pressure.   • Mass     Bump on head- left side, comes and goes       Subjective      Rachana Patrick is a 48 y.o. who presents for 4 days of runny nose, increasing sinus pressure, nasal discharge, cough with gray sputum production and sensation of wheezing    Objective   Vital Signs:  /64   Pulse 92   Temp 97.7 °F (36.5 °C)   Resp 18   Ht 157.5 cm (62.01\")   Wt 79.6 kg (175 lb 6.4 oz)   SpO2 98%   BMI 32.07 kg/m²     Physical Exam  Vitals reviewed.   Constitutional:       Appearance: Normal appearance.   HENT:      Head: Normocephalic and atraumatic.      Right Ear: Tympanic membrane and ear canal normal.      Left Ear: Tympanic membrane and ear canal normal.      Nose:      Right Sinus: Maxillary sinus tenderness present.      Mouth/Throat:      Mouth: Mucous membranes are moist.      Pharynx: Oropharynx is clear.   Eyes:      Conjunctiva/sclera: Conjunctivae normal.   Cardiovascular:      Rate and Rhythm: Normal rate and regular rhythm.      Heart sounds: Normal heart sounds. No murmur heard.  Pulmonary:      Effort: Pulmonary effort is normal. No respiratory distress.      Breath sounds: Wheezing present.   Musculoskeletal:      Cervical back: Normal range of motion and neck supple. No tenderness.   Lymphadenopathy:      Cervical: No cervical adenopathy.   Skin:     General: Skin is warm and dry.   Neurological:      Mental Status: She is alert.   Psychiatric:         Mood and Affect: Mood normal.          Result Review   The following data was reviewed by: Jerel Agudelo MD on 04/04/2023:    Data reviewed: Point-of-care flu and COVID testing negative              Assessment and Plan  Diagnoses and all orders for this visit:    1. Acute non-recurrent maxillary sinusitis (Primary)  -     " amoxicillin-clavulanate (Augmentin) 875-125 MG per tablet; Take 1 tablet by mouth 2 (Two) Times a Day.  Dispense: 14 tablet; Refill: 0    2. Acute cough  -     POCT SARS-CoV-2 Antigen MEDINA    3. Acute bronchitis, unspecified organism  -     promethazine-dextromethorphan (PROMETHAZINE-DM) 6.25-15 MG/5ML syrup; Take 5 mL by mouth 4 (Four) Times a Day As Needed for Cough.  Dispense: 180 mL; Refill: 0    Patient start antibiotics and cough medications.        Follow Up  No follow-ups on file.  Patient was given instructions and counseling regarding her condition or for health maintenance advice. Please see specific information pulled into the AVS if appropriate.

## 2023-04-06 ENCOUNTER — OFFICE VISIT (OUTPATIENT)
Dept: ENDOCRINOLOGY | Facility: CLINIC | Age: 48
End: 2023-04-06
Payer: COMMERCIAL

## 2023-04-06 VITALS
HEART RATE: 91 BPM | SYSTOLIC BLOOD PRESSURE: 134 MMHG | DIASTOLIC BLOOD PRESSURE: 70 MMHG | WEIGHT: 177 LBS | BODY MASS INDEX: 32.57 KG/M2 | HEIGHT: 62 IN | OXYGEN SATURATION: 99 %

## 2023-04-06 DIAGNOSIS — E11.42 TYPE 2 DIABETES MELLITUS WITH DIABETIC POLYNEUROPATHY, WITH LONG-TERM CURRENT USE OF INSULIN: Primary | ICD-10-CM

## 2023-04-06 DIAGNOSIS — Z79.4 TYPE 2 DIABETES MELLITUS WITH DIABETIC POLYNEUROPATHY, WITH LONG-TERM CURRENT USE OF INSULIN: Primary | ICD-10-CM

## 2023-04-06 LAB
EXPIRATION DATE: ABNORMAL
EXPIRATION DATE: NORMAL
GLUCOSE BLDC GLUCOMTR-MCNC: 145 MG/DL (ref 70–130)
HBA1C MFR BLD: 7 %
Lab: ABNORMAL
Lab: NORMAL

## 2023-04-06 PROCEDURE — 83036 HEMOGLOBIN GLYCOSYLATED A1C: CPT | Performed by: INTERNAL MEDICINE

## 2023-04-06 PROCEDURE — 99214 OFFICE O/P EST MOD 30 MIN: CPT | Performed by: INTERNAL MEDICINE

## 2023-04-06 PROCEDURE — 1160F RVW MEDS BY RX/DR IN RCRD: CPT | Performed by: INTERNAL MEDICINE

## 2023-04-06 PROCEDURE — 1159F MED LIST DOCD IN RCRD: CPT | Performed by: INTERNAL MEDICINE

## 2023-04-06 PROCEDURE — 3078F DIAST BP <80 MM HG: CPT | Performed by: INTERNAL MEDICINE

## 2023-04-06 PROCEDURE — 3051F HG A1C>EQUAL 7.0%<8.0%: CPT | Performed by: INTERNAL MEDICINE

## 2023-04-06 PROCEDURE — 3075F SYST BP GE 130 - 139MM HG: CPT | Performed by: INTERNAL MEDICINE

## 2023-04-06 PROCEDURE — 82947 ASSAY GLUCOSE BLOOD QUANT: CPT | Performed by: INTERNAL MEDICINE

## 2023-04-06 RX ORDER — PROCHLORPERAZINE 25 MG/1
1 SUPPOSITORY RECTAL TAKE AS DIRECTED
Qty: 3 EACH | Refills: 11 | Status: SHIPPED | OUTPATIENT
Start: 2023-04-06

## 2023-04-06 RX ORDER — SEMAGLUTIDE 1.34 MG/ML
1 INJECTION, SOLUTION SUBCUTANEOUS
Qty: 3 ML | Refills: 1 | Status: SHIPPED | OUTPATIENT
Start: 2023-04-06

## 2023-04-06 RX ORDER — PROCHLORPERAZINE 25 MG/1
1 SUPPOSITORY RECTAL TAKE AS DIRECTED
Qty: 1 EACH | Refills: 3 | Status: SHIPPED | OUTPATIENT
Start: 2023-04-06

## 2023-04-06 RX ORDER — INSULIN LISPRO 100 [IU]/ML
INJECTION, SOLUTION INTRAVENOUS; SUBCUTANEOUS
Qty: 45 ML | Refills: 1 | Status: SHIPPED | OUTPATIENT
Start: 2023-04-06

## 2023-04-06 RX ORDER — INSULIN DETEMIR 100 [IU]/ML
40 INJECTION, SOLUTION SUBCUTANEOUS DAILY
Qty: 45 ML | Refills: 1 | Status: SHIPPED | OUTPATIENT
Start: 2023-04-06

## 2023-04-06 RX ORDER — PROCHLORPERAZINE 25 MG/1
1 SUPPOSITORY RECTAL ONCE
Qty: 1 EACH | Refills: 0 | Status: SHIPPED | OUTPATIENT
Start: 2023-04-06 | End: 2023-04-06

## 2023-04-06 NOTE — PROGRESS NOTES
"Chief Complaint   Patient presents with   • Diabetes     Type II          HPI   Rachana Patrick is a 48 y.o. female had concerns including Diabetes (Type II).    She is checking blood sugars 3 times per day. Morning sugars are 170s-180s and then by evening often 150s. Has rare hyperglycemia - one day a high BG to 300s.   Current medications for diabetes include levemir 40 units once daily in the evening, humalog 8-10 units with meals, ozempic 0.5 mg weekly, jardiance 25 mg daily.     Weight is down 3 lbs since her last visit.   Wants higher dose of ozempic.     The following portions of the patient's history were reviewed and updated as appropriate: allergies, current medications, past family history, past medical history, past social history, past surgical history and problem list.      Review of Systems   Constitutional: Negative.    Endocrine:        See HPI   Neurological: Positive for numbness.        Physical Exam  Vitals reviewed.   Constitutional:       Appearance: Normal appearance. She is obese.      Comments: Body mass index is 32.37 kg/m².   Cardiovascular:      Rate and Rhythm: Normal rate.      Pulses:           Dorsalis pedis pulses are 1+ on the right side and 1+ on the left side.   Pulmonary:      Effort: Pulmonary effort is normal.   Feet:      Right foot:      Skin integrity: Callus present.      Toenail Condition: Right toenails are normal.      Left foot:      Skin integrity: Callus present.      Toenail Condition: Left toenails are normal.      Comments: Diabetic Foot Exam Performed and Monofilament Test Performed    Monofilament 2/5 bilaterally    Neurological:      General: No focal deficit present.      Mental Status: She is alert. Mental status is at baseline.   Psychiatric:         Mood and Affect: Mood normal.         Behavior: Behavior normal.        /70 (BP Location: Left arm, Patient Position: Sitting, Cuff Size: Adult)   Pulse 91   Ht 157.5 cm (62\")   Wt 80.3 kg (177 lb)   " SpO2 99%   BMI 32.37 kg/m²      Labs and imaging    CMP:  Lab Results   Component Value Date    BUN 16 11/29/2022    CREATININE 0.92 11/29/2022    BCR 17 11/29/2022     11/29/2022    K 4.6 11/29/2022    CO2 22 11/29/2022    CALCIUM 9.6 11/29/2022    PROTENTOTREF 7.3 11/29/2022    ALBUMIN 4.2 11/29/2022    LABGLOBREF 3.1 11/29/2022    LABIL2 1.4 11/29/2022    BILITOT 0.3 11/29/2022    ALKPHOS 137 (H) 11/29/2022    AST 35 11/29/2022    ALT 30 11/29/2022     Lipid Panel:  Lab Results   Component Value Date    CHOL 92 11/15/2022    TRIG 103 11/15/2022    HDL 29 (L) 11/15/2022    VLDL 20 11/15/2022    LDL 43 11/15/2022     HbA1c:  Lab Results   Component Value Date    HGBA1C 7.0 04/06/2023    HGBA1C 9.0 12/15/2022     Glucose:    Lab Results   Component Value Date    POCGLU 145 (A) 04/06/2023     Microalbumin:  Lab Results   Component Value Date    MALBCRERATIO 32.1 12/15/2022     Assessment and plan  Diagnoses and all orders for this visit:    1. Type 2 diabetes mellitus with polyneuropathy, with long-term current use of insulin (Primary)  Mostly controlled now with A1c much improved to 7.0. Complicated by neuropathy and microalbuminuria.   No metformin due to GI intolerance.   Continue jardiance 25 mg daily.   Increase ozempic to 1 mg weekly and titrate up in a month if tolerated.   Continue levemir 40 units once daily - works better for pt than lantus.   Continue humalog 8-10 units with meals.   CGM script sent.   Ophtho exam is UTD. Pt will have the report sent here. MF UTD today. Labs UTD from 12/22.   -     POC Glucose, Blood  -     POC Glycosylated Hemoglobin (Hb A1C)  -     Semaglutide, 1 MG/DOSE, (Ozempic, 1 MG/DOSE,) 4 MG/3ML solution pen-injector; Inject 1 mg under the skin into the appropriate area as directed Every 7 (Seven) Days.  Dispense: 3 mL; Refill: 1  -     HumaLOG KwikPen 100 UNIT/ML solution pen-injector; 8-10 units with meals plus 1:50>150, MDD 45 units  Dispense: 45 mL; Refill: 1  -      Continuous Blood Gluc Sensor (Dexcom G6 Sensor); 1 each Take As Directed.  Dispense: 3 each; Refill: 11  -     Continuous Blood Gluc  (Dexcom G6 ) device; 1 each 1 (One) Time for 1 dose.  Dispense: 1 each; Refill: 0  -     Continuous Blood Gluc Transmit (Dexcom G6 Transmitter) misc; 1 each Take As Directed.  Dispense: 1 each; Refill: 3  -     Insulin Pen Needle 32G X 4 MM misc; 1 each 4 (Four) Times a Day.  Dispense: 400 each; Refill: 3  -     insulin detemir (Levemir FlexTouch) 100 UNIT/ML injection; Inject 40 Units under the skin into the appropriate area as directed Daily.  Dispense: 45 mL; Refill: 1         Return in about 4 months (around 8/6/2023) for next scheduled follow up. The patient was instructed to contact the clinic with any interval questions or concerns.    Yuki Hughes, DO   Endocrinologist    Please note that portions of this note were completed with a voice recognition program.

## 2023-04-13 ENCOUNTER — OFFICE VISIT (OUTPATIENT)
Dept: NEUROLOGY | Facility: CLINIC | Age: 48
End: 2023-04-13
Payer: COMMERCIAL

## 2023-04-13 VITALS
HEIGHT: 62 IN | BODY MASS INDEX: 31.83 KG/M2 | WEIGHT: 173 LBS | SYSTOLIC BLOOD PRESSURE: 134 MMHG | TEMPERATURE: 98 F | DIASTOLIC BLOOD PRESSURE: 86 MMHG | OXYGEN SATURATION: 99 % | HEART RATE: 101 BPM

## 2023-04-13 DIAGNOSIS — I10 ESSENTIAL HYPERTENSION: ICD-10-CM

## 2023-04-13 DIAGNOSIS — Z79.4 TYPE 2 DIABETES MELLITUS WITH HYPERGLYCEMIA, WITH LONG-TERM CURRENT USE OF INSULIN: ICD-10-CM

## 2023-04-13 DIAGNOSIS — Z86.73 HISTORY OF STROKE: Primary | ICD-10-CM

## 2023-04-13 DIAGNOSIS — E11.65 TYPE 2 DIABETES MELLITUS WITH HYPERGLYCEMIA, WITH LONG-TERM CURRENT USE OF INSULIN: ICD-10-CM

## 2023-04-13 DIAGNOSIS — Z72.0 TOBACCO ABUSE: ICD-10-CM

## 2023-04-13 DIAGNOSIS — E78.5 HYPERLIPIDEMIA LDL GOAL <70: ICD-10-CM

## 2023-04-13 PROCEDURE — 1159F MED LIST DOCD IN RCRD: CPT | Performed by: NURSE PRACTITIONER

## 2023-04-13 PROCEDURE — 3079F DIAST BP 80-89 MM HG: CPT | Performed by: NURSE PRACTITIONER

## 2023-04-13 PROCEDURE — 3075F SYST BP GE 130 - 139MM HG: CPT | Performed by: NURSE PRACTITIONER

## 2023-04-13 PROCEDURE — 1160F RVW MEDS BY RX/DR IN RCRD: CPT | Performed by: NURSE PRACTITIONER

## 2023-04-13 PROCEDURE — 99212 OFFICE O/P EST SF 10 MIN: CPT | Performed by: NURSE PRACTITIONER

## 2023-04-13 NOTE — PROGRESS NOTES
Follow Up Office Visit      Encounter Date: 2023   Patient Name: Rachana Patrick  : 1975   MRN: [id number]   PCP: Jerel Agudelo MD    Referring Provider: No ref. provider found     Chief Complaint:    Chief Complaint   Patient presents with   • Follow-up       History of Present Illness: Rachana Patrick is a 48 y.o. female who is here today to follow up with stroke.    Initial visit: Rachana Patrick is a 47 y.o. female  with a PMH significant for LPCA stroke (BHR 10/22/2022, right eye blurred vision), bilateral carotid disease, HTN, HLD,T2DM (uncontrolled), tobacco abuse, and marijuana use who presents to the [facility] ED on 10/31/2022 with c/o global severe headache 10/10 and blurred vision in her right eye.   She has had blurred vision in her right eye since her stroke on 10/22. There was an initial delay in imaging as patient is agitated and did not initially want an IV started. Discussed with her at length the need for advanced imaging and she voiced understanding. Migraine cocktail ordered. CT head negative for hemorrhage, recent left thalamic  infarct noted.  CTA H/N with bilateral carotid disease.  No LVO.  Diminutive right vertebral artery.   MRI revealed some extension of her recent L PCA stroke.  She was given a migraine cocktail with improvement in headache symptoms.  TTE with no PFO, no LAE.  NIH at discharge was 1.  She was discharged on DAPT with aspirin and Plavix as well as high-dose statin/fenofibrate.  She was ordered to follow-up with  ophthalmology for driving clearance.  On 2022, she presented to [facility] ED  with chest pain and confusion.  CT of the head revealed a subtle 8 mm area of hyperdensity in the left thalamus concerning for possible hemorrhage within her previous stroke bed.  Area of suspected hemorrhage was less apparent on repeat CT 11/15.  Patient was also noted to have leukocytosis with WBCs of 28,000.  Cultures  were ordered. Per her significant other,  she was noted to have intermittent confusion since her stroke.  Toxicology screen was positive for THC.  She was admitted to the ICU for further management.  Unfortunately, the patient left AMA prior to obtaining MRI and obtaining cultures.      Last visit 12/13/2022: Per review of medical record, the patient had been started on Lyrica by her PCP.  Per her family, she had increased confusion since initiation of the medication.  She followed up with her PCP on 11/29/2022 with orders to discontinue Lyrica until the patient was evaluated today.  Patient reports he continues she continues to have LHH.  She continues with intermittent bouts of confusion and memory issues as well as intermittent coordination difficulties.  She did not discontinue her Lyrica.  She reports significant diabetic neuropathy in her feet.  Patient reports that she had been on it for years and tolerated it well prior to seeing her new PCP in November.  She reports that her blood pressure has been doing okay at home.  Blood sugars have been greater than 200.  She is due to follow-up with ophthalmology in February.  NIH 1 today for LHH.  GCS is 15.  She reports that she has been compliant with her aspirin, Plavix, and high-dose statin.     Clinic visit 1/13/23:   Patient is having a lot of trouble with concentration during appointment. She reports feeling anxious. She reports she has panic attacks when she goes to the grocery store. She is scheduled to be evaluated by her eye doctor to see if she can be cleared to drive. This is her number one concern at this time. She has no other questions or concerns. Reviewed plan to transition to  mg monotherapy and patient verbalized understanding.     Clinic visit 4/13/2023: Patient has been doing well.  Continues to complain of decreased vision in her left peripheral fields but is able to detect finger movement on exam. She states that she is unable to drive but has not yet seen ophthalmology. She  reports that her mental status and confusion have improved. She still has intermittent difficulties with concentration. She reports compliance with her medication regimen.     Subjective        I have reviewed and the following portions of the patient's history were updated as appropriate: past family history, past medical history, past social history, past surgical history and problem list.    Medications:     Current Outpatient Medications:   •  albuterol sulfate  (90 Base) MCG/ACT inhaler, INHALE 2 PUFFS PO Q 6 H PRN, Disp: , Rfl: 0  •  amLODIPine (Norvasc) 10 MG tablet, Take 1 tablet by mouth Daily., Disp: 30 tablet, Rfl: 5  •  aspirin (Laura Aspirin) 325 MG tablet, Take 1 tablet by mouth Daily., Disp: 100 tablet, Rfl: 3  •  atorvastatin (Lipitor) 40 MG tablet, Take 1 tablet by mouth Daily., Disp: 30 tablet, Rfl: 11  •  Continuous Blood Gluc Sensor (Dexcom G6 Sensor), 1 each Take As Directed., Disp: 3 each, Rfl: 11  •  Continuous Blood Gluc Transmit (Dexcom G6 Transmitter) misc, 1 each Take As Directed., Disp: 1 each, Rfl: 3  •  cyclobenzaprine (FLEXERIL) 10 MG tablet, , Disp: , Rfl:   •  gentamicin (GARAMYCIN) 0.1 % ointment, APPLY UP TO 2 GRAMS TO AFFECTED AREA TWICE DAILY AS DIRECTED., Disp: , Rfl:   •  glucose blood test strip, 1 each by Other route 4 (Four) Times a Day., Disp: 400 each, Rfl: 3  •  glucose monitor monitoring kit, 1 each 4 (Four) Times a Day., Disp: 1 each, Rfl: 0  •  HumaLOG KwikPen 100 UNIT/ML solution pen-injector, 8-10 units with meals plus 1:50>150, MDD 45 units, Disp: 45 mL, Rfl: 1  •  insulin detemir (Levemir FlexTouch) 100 UNIT/ML injection, Inject 40 Units under the skin into the appropriate area as directed Daily., Disp: 45 mL, Rfl: 1  •  Insulin Pen Needle 32G X 4 MM misc, 1 each 4 (Four) Times a Day., Disp: 400 each, Rfl: 3  •  Jardiance 25 MG tablet tablet, Take 1 tablet by mouth Daily., Disp: 30 tablet, Rfl: 11  •  ketoconazole (NIZORAL) 2 % cream, APPLY UP TO 1 GRAM TO  AFFECTED AREA TWICE DAILY., Disp: , Rfl:   •  Lancets (OneTouch Delica Plus Cnchhl28I) misc, 1 each by Other route 4 (Four) Times a Day., Disp: 400 each, Rfl: 3  •  linaclotide (Linzess) 72 MCG capsule capsule, Take 1 capsule by mouth Every Morning Before Breakfast., Disp: 30 capsule, Rfl: 2  •  losartan (COZAAR) 100 MG tablet, Take 1 tablet by mouth Daily. Hold unless SBP > 160, Disp: 90 tablet, Rfl: 1  •  polyethylene glycol (MiraLax) 17 GM/SCOOP powder, Take 34 g by mouth Daily., Disp: 850 g, Rfl: 2  •  pregabalin (LYRICA) 300 MG capsule, Take 1 capsule by mouth 2 (Two) Times a Day., Disp: 60 capsule, Rfl: 5  •  promethazine-dextromethorphan (PROMETHAZINE-DM) 6.25-15 MG/5ML syrup, Take 5 mL by mouth 4 (Four) Times a Day As Needed for Cough., Disp: 180 mL, Rfl: 0  •  Semaglutide, 1 MG/DOSE, (Ozempic, 1 MG/DOSE,) 4 MG/3ML solution pen-injector, Inject 1 mg under the skin into the appropriate area as directed Every 7 (Seven) Days., Disp: 3 mL, Rfl: 1  •  amoxicillin-clavulanate (Augmentin) 875-125 MG per tablet, Take 1 tablet by mouth 2 (Two) Times a Day. (Patient not taking: Reported on 4/13/2023), Disp: 14 tablet, Rfl: 0    Allergies:   Allergies   Allergen Reactions   • Metformin GI Intolerance       Objective     Physical Exam:   Physical Exam  Constitutional:       Appearance: Normal appearance.   HENT:      Head: Normocephalic and atraumatic.   Eyes:      Extraocular Movements: Extraocular movements intact.      Pupils: Pupils are equal, round, and reactive to light.   Cardiovascular:      Rate and Rhythm: Normal rate and regular rhythm.   Pulmonary:      Effort: Pulmonary effort is normal. No respiratory distress.   Abdominal:      General: There is no distension.      Palpations: Abdomen is soft.   Musculoskeletal:         General: Normal range of motion.      Cervical back: Normal range of motion and neck supple.   Skin:     General: Skin is warm and dry.      Capillary Refill: Capillary refill takes less  "than 2 seconds.   Neurological:      Mental Status: She is alert and oriented to person, place, and time.      Cranial Nerves: Cranial nerve deficit present.      Sensory: No sensory deficit.      Motor: No weakness.      Coordination: Coordination normal.      Gait: Gait normal.      Deep Tendon Reflexes: Reflexes normal.      Comments: Reports vision loss in her left peripheral visual field. Is able to detect finger movement on exam.          Vital Signs:   Vitals:    04/13/23 1520   BP: 134/86   Pulse: 101   Temp: 98 °F (36.7 °C)   SpO2: 99%   Weight: 78.5 kg (173 lb)   Height: 157.3 cm (61.93\")     Body mass index is 31.71 kg/m².          Assessment / Plan      Assessment/Plan:   Diagnoses and all orders for this visit:    1. History of stroke (Primary)  -Recent left PCA territory infarct in October.  She was discharged home on DAPT with aspirin and Plavix for 90 days as well as high-dose statin and fenofibrate.   -Admitted to [facility] on 10/31/2022 with severe headache.  MRI revealed an extension of her previous L PCA stroke.  She was discharged home on DAPT and statin as well as fenofibrate.  -Admitted to [facility] on 11/14 with subtle area of hyperdensity in her previous stroke bed.  Less apparent on repeat CT head on 11/15.  Patient left AMA prior to completion of her work-up and obtaining MRI.  --Continue aspirin 325 mg daily  -Cardiac event monitor with no evidence of AF  -MRI brain done last appointment due to ongoing memory issues. It showed expected evolutionary changes of the previously noted multifocal acute  infarcts on the left. No evidence of acute ischemia, interval hemorrhage  or new mass effect.  -No driving until cleared by ophthalmology   --Return to the ED with any additional stroke symptoms    2. Type 2 diabetes mellitus with hyperglycemia, with long-term current use of insulin  --Hemoglobin A1c 7 on 4/6/2023; Improving.   --Following with Dr. Hughes    3. Hyperlipidemia LDL goal <70  --Continue high " dose statin     4. Essential hypertension  --BP goals <130/80  --Management per PCP    5. Tobacco abuse  --Smoking cessation counseling    6. Confusion  --Improving     Discussed the importance of medication compliance Aspirin 325mg daily and Atorvastatin 40mg nightly and lifestyle modifications adequate control of blood pressure, adequate control of cholesterol (goal LDL <70), adequate control of glucose (<140, A1c goal <7), increased physical activity and implementation of healthy diet to help reduce the risk of future cerebrovascular events.  Also discussed the signs symptoms that would warrant the patient return back to the emergency department including unilateral weakness, unilateral numbness, visual disturbances, loss of balance, speech difficulties, and/or a sudden severe headache.  Patient verbalized understanding.      Follow Up:   Return if symptoms worsen or fail to improve.    LAZARA Morales, [facility]

## 2023-04-13 NOTE — PATIENT INSTRUCTIONS
Continue Aspirin 325mg daily  Continue atorvastatin 40mg daily   BP goals <130/80  Heart Healthy Diet   Hemoglobin A1c goal <7  Increase activity as tolerated  Return to the ED with any stroke like symptoms

## 2023-04-14 ENCOUNTER — TELEPHONE (OUTPATIENT)
Dept: ENDOCRINOLOGY | Facility: CLINIC | Age: 48
End: 2023-04-14
Payer: COMMERCIAL

## 2023-04-14 NOTE — TELEPHONE ENCOUNTER
Pt's  called call center on 04/10/2023 to report her episode of hypoglycemia in the afternoon. Pt did not have her Dexcom  with her. Hypoglycemia occurred many hours after prandial insulin. Pt reported AM glucose readings in the low 100's. Took first dose of Ozempic 1 mg last week. Advised her to decrease her Levemir from 40 units to 36 units nightly. Pt to call if she continues to have hypoglycemia < 70.   Signed: Farhana Graf MD

## 2023-05-01 DIAGNOSIS — Z79.4 TYPE 2 DIABETES MELLITUS WITH DIABETIC POLYNEUROPATHY, WITH LONG-TERM CURRENT USE OF INSULIN: ICD-10-CM

## 2023-05-01 DIAGNOSIS — E11.42 TYPE 2 DIABETES MELLITUS WITH DIABETIC POLYNEUROPATHY, WITH LONG-TERM CURRENT USE OF INSULIN: ICD-10-CM

## 2023-05-01 RX ORDER — SEMAGLUTIDE 1.34 MG/ML
1 INJECTION, SOLUTION SUBCUTANEOUS
Qty: 3 ML | Refills: 1 | Status: SHIPPED | OUTPATIENT
Start: 2023-05-01

## 2023-05-01 NOTE — TELEPHONE ENCOUNTER
Rx Refill Note  Requested Prescriptions     Pending Prescriptions Disp Refills   • Semaglutide, 1 MG/DOSE, (Ozempic, 1 MG/DOSE,) 4 MG/3ML solution pen-injector 3 mL 1     Sig: Inject 1 mg under the skin into the appropriate area as directed Every 7 (Seven) Days.          Last office visit with prescribing clinician: 4/6/2023     Next office visit with prescribing clinician: 8/14/2023         Veronica Mascorro MA  05/01/23, 16:33 EDT

## 2023-05-25 ENCOUNTER — TELEPHONE (OUTPATIENT)
Dept: ENDOCRINOLOGY | Facility: CLINIC | Age: 48
End: 2023-05-25
Payer: COMMERCIAL

## 2023-05-25 NOTE — TELEPHONE ENCOUNTER
Yes, likely due to Ozempic. Some of my patients note improved side effects if injecting in the leg vs stomach. Should improve with time, but if not, we can reduce the dose.

## 2023-05-25 NOTE — TELEPHONE ENCOUNTER
Patient called stated she is having some extreme belching and thinks this is due to Ozempic. She also stated her belches are having a foul odor. She wanted to know if this is a normal side effect and if there is anything to help this go away. Please advise.

## 2023-06-05 ENCOUNTER — TELEPHONE (OUTPATIENT)
Dept: ENDOCRINOLOGY | Facility: CLINIC | Age: 48
End: 2023-06-05
Payer: COMMERCIAL

## 2023-06-05 DIAGNOSIS — Z79.4 TYPE 2 DIABETES MELLITUS WITH DIABETIC POLYNEUROPATHY, WITH LONG-TERM CURRENT USE OF INSULIN: ICD-10-CM

## 2023-06-05 DIAGNOSIS — E11.40 TYPE 2 DIABETES MELLITUS WITH DIABETIC NEUROPATHY, WITH LONG-TERM CURRENT USE OF INSULIN: ICD-10-CM

## 2023-06-05 DIAGNOSIS — E78.5 HYPERLIPIDEMIA LDL GOAL <70: ICD-10-CM

## 2023-06-05 DIAGNOSIS — Z79.4 TYPE 2 DIABETES MELLITUS WITH DIABETIC NEUROPATHY, WITH LONG-TERM CURRENT USE OF INSULIN: ICD-10-CM

## 2023-06-05 DIAGNOSIS — Z86.73 HISTORY OF STROKE: ICD-10-CM

## 2023-06-05 DIAGNOSIS — K59.04 CHRONIC IDIOPATHIC CONSTIPATION: ICD-10-CM

## 2023-06-05 DIAGNOSIS — E11.65 TYPE 2 DIABETES MELLITUS WITH HYPERGLYCEMIA, WITH LONG-TERM CURRENT USE OF INSULIN: ICD-10-CM

## 2023-06-05 DIAGNOSIS — Z79.4 TYPE 2 DIABETES MELLITUS WITH HYPERGLYCEMIA, WITH LONG-TERM CURRENT USE OF INSULIN: ICD-10-CM

## 2023-06-05 DIAGNOSIS — E11.42 TYPE 2 DIABETES MELLITUS WITH DIABETIC POLYNEUROPATHY, WITH LONG-TERM CURRENT USE OF INSULIN: ICD-10-CM

## 2023-06-05 RX ORDER — CYCLOBENZAPRINE HCL 10 MG
TABLET ORAL
Status: CANCELLED | OUTPATIENT
Start: 2023-06-05

## 2023-06-05 RX ORDER — SEMAGLUTIDE 1.34 MG/ML
1 INJECTION, SOLUTION SUBCUTANEOUS
Qty: 3 ML | Refills: 1 | Status: SHIPPED | OUTPATIENT
Start: 2023-06-05

## 2023-06-05 NOTE — TELEPHONE ENCOUNTER
Rx Refill Note  Requested Prescriptions      No prescriptions requested or ordered in this encounter          Last office visit with prescribing clinician: Visit date not found     Next office visit with prescribing clinician: Visit date not found         Veronica Mascorro MA  06/05/23, 15:18 EDT

## 2023-06-05 NOTE — TELEPHONE ENCOUNTER
Caller:     Relationship:     Best call back number: [phone]    Requested Prescriptions:   Requested Prescriptions     Pending Prescriptions Disp Refills    Jardiance 25 MG tablet tablet 30 tablet 11     Sig: Take 1 tablet by mouth Daily.    pregabalin (LYRICA) 300 MG capsule 60 capsule 5     Sig: Take 1 capsule by mouth 2 (Two) Times a Day.    losartan (COZAAR) 100 MG tablet 90 tablet 1     Sig: Take 1 tablet by mouth Daily. Hold unless SBP > 160    amLODIPine (Norvasc) 10 MG tablet 30 tablet 5     Sig: Take 1 tablet by mouth Daily.    Semaglutide, 1 MG/DOSE, (Ozempic, 1 MG/DOSE,) 4 MG/3ML solution pen-injector 3 mL 1     Sig: Inject 1 mg under the skin into the appropriate area as directed Every 7 (Seven) Days.    HumaLOG KwikPen 100 UNIT/ML solution pen-injector 45 mL 1     Si-10 units with meals plus 1:50>150, MDD 45 units    Lancets (OneTouch Delica Plus Sienly95X) misc 400 each 3     Si each by Other route 4 (Four) Times a Day.    insulin detemir (Levemir FlexTouch) 100 UNIT/ML injection 45 mL 1     Sig: Inject 40 Units under the skin into the appropriate area as directed Daily.    aspirin (Laura Aspirin) 325 MG tablet 100 tablet 3     Sig: Take 1 tablet by mouth Daily.    cyclobenzaprine (FLEXERIL) 10 MG tablet      atorvastatin (Lipitor) 40 MG tablet 30 tablet 11     Sig: Take 1 tablet by mouth Daily.    linaclotide (Linzess) 72 MCG capsule capsule 30 capsule 2     Sig: Take 1 capsule by mouth Every Morning Before Breakfast.        Pharm[facility] where request should be sent: [id number] [address] - [phone]  - [phone] FX     Last office visit with prescribing clinician: 2023   Last telemedicine visit with prescribing clinician: Visit date not found   Next office visit with prescribing clinician: Visit date not found     Additional details provided by patient: PATIENT PHARMACY CLOSED, NEEDS REFILLS  PATIENT IS  OUT OF THE ASPIRIN    Does the patient have less than a 3 day supply:  [] Yes  [] No    Would you like a call back once the refill request has been completed: [] Yes [] No    If the office needs to give you a call back, can they leave a voicemail: [] Yes [] No    Pj Espinoza Rep   06/05/23 12:35 EDT

## 2023-06-05 NOTE — TELEPHONE ENCOUNTER
Caller: KEEGAN DAIGLE    Relationship:SELF     Best call back number: [phone]    Requested Prescriptions:   Semaglutide, 1 MG/DOSE, (Ozempic, 1 MG/DOSE,) 4 MG/3ML solution pen-injector       Pharmacy where request should be sent: [facility] [address] [place] [address] PH:[phone]    Last office visit with prescribing clinician: Visit date not found   Last telemedicine visit with prescribing clinician: 6/5/2023   Next office visit with prescribing clinician: Visit date not found     Additional details provided by patient: PATIENT STATED SHE HAS SWITCHED PHARMACIES TO THE [facility] AT [address] [place] [address] AND FROM NOW ON ALL HER PRESCRIPTION NEED TO BE SENT THERE.    Does the patient have less than a 3 day supply:  [x] Yes  [] No    Would you like a call back once the refill request has been completed: [x] Yes [] No    If the office needs to give you a call back, can they leave a voicemail: [x] Yes [] No    Pj Vieyra Rep   06/05/23 14:33 EDT

## 2023-06-08 ENCOUNTER — TELEPHONE (OUTPATIENT)
Dept: ENDOCRINOLOGY | Facility: CLINIC | Age: 48
End: 2023-06-08
Payer: COMMERCIAL

## 2023-06-08 DIAGNOSIS — E11.42 TYPE 2 DIABETES MELLITUS WITH DIABETIC POLYNEUROPATHY, WITH LONG-TERM CURRENT USE OF INSULIN: ICD-10-CM

## 2023-06-08 DIAGNOSIS — Z79.4 TYPE 2 DIABETES MELLITUS WITH DIABETIC POLYNEUROPATHY, WITH LONG-TERM CURRENT USE OF INSULIN: ICD-10-CM

## 2023-06-08 RX ORDER — EMPAGLIFLOZIN 25 MG/1
25 TABLET, FILM COATED ORAL DAILY
Qty: 30 TABLET | Refills: 11 | Status: SHIPPED | OUTPATIENT
Start: 2023-06-08

## 2023-06-08 RX ORDER — INSULIN LISPRO 100 [IU]/ML
INJECTION, SOLUTION INTRAVENOUS; SUBCUTANEOUS
Qty: 45 ML | Refills: 1 | OUTPATIENT
Start: 2023-06-08

## 2023-06-08 RX ORDER — ASPIRIN 325 MG
325 TABLET ORAL DAILY
Qty: 100 TABLET | Refills: 3 | Status: SHIPPED | OUTPATIENT
Start: 2023-06-08 | End: 2024-06-07

## 2023-06-08 RX ORDER — LANCETS 30 GAUGE
1 EACH MISCELLANEOUS 4 TIMES DAILY
Qty: 400 EACH | Refills: 3 | OUTPATIENT
Start: 2023-06-08

## 2023-06-08 RX ORDER — INSULIN LISPRO 100 [IU]/ML
INJECTION, SOLUTION INTRAVENOUS; SUBCUTANEOUS
Qty: 60 ML | Refills: 3 | Status: SHIPPED | OUTPATIENT
Start: 2023-06-08

## 2023-06-08 RX ORDER — AMLODIPINE BESYLATE 10 MG/1
10 TABLET ORAL DAILY
Qty: 30 TABLET | Refills: 5 | Status: SHIPPED | OUTPATIENT
Start: 2023-06-08

## 2023-06-08 RX ORDER — ATORVASTATIN CALCIUM 40 MG/1
40 TABLET, FILM COATED ORAL DAILY
Qty: 30 TABLET | Refills: 11 | Status: SHIPPED | OUTPATIENT
Start: 2023-06-08 | End: 2024-06-07

## 2023-06-08 RX ORDER — SEMAGLUTIDE 1.34 MG/ML
1 INJECTION, SOLUTION SUBCUTANEOUS
Qty: 3 ML | Refills: 1 | OUTPATIENT
Start: 2023-06-08

## 2023-06-08 RX ORDER — INSULIN DETEMIR 100 [IU]/ML
40 INJECTION, SOLUTION SUBCUTANEOUS DAILY
Qty: 45 ML | Refills: 1 | OUTPATIENT
Start: 2023-06-08

## 2023-06-08 RX ORDER — PREGABALIN 300 MG/1
300 CAPSULE ORAL 2 TIMES DAILY
Qty: 60 CAPSULE | Refills: 5 | Status: SHIPPED | OUTPATIENT
Start: 2023-06-08

## 2023-06-08 RX ORDER — LOSARTAN POTASSIUM 100 MG/1
100 TABLET ORAL DAILY
Qty: 90 TABLET | Refills: 1 | Status: SHIPPED | OUTPATIENT
Start: 2023-06-08

## 2023-06-08 NOTE — TELEPHONE ENCOUNTER
Rx Refill Note  Requested Prescriptions     Pending Prescriptions Disp Refills    HumaLOG KwikPen 100 UNIT/ML solution pen-injector [Pharmacy Med Name: HUMALOG 100 U/ML KWIK PEN INJ 3ML] 60 mL 3     Sig: INJECT 12 TO 15 UNITS WITH MEALS PLUS 1:50>150, MAX DAILY DOSE 60 UNITS          Last office visit with prescribing clinician: 4/6/2023     Next office visit with prescribing clinician: 8/14/2023         Veronica Mascorro MA  06/08/23, 16:24 EDT

## 2023-06-08 NOTE — TELEPHONE ENCOUNTER
PATIENT'S PHARMACY HAS CLOSED AND ALL CURRENT RX WERE SENT TO ANOTHER PHARMACY. PATIENT STATES THAT NEW PHARMACY IS REQUIRING AUTHORIZATION TO FILL. IT LOOKS AS THOUGH THIS RX WAS SENT TO NEW PHARMACY ON 6/5/23. UNSURE IF RX REQUIRES A PRIOR AUTH OR IF NEW PHARMACY IS NEEDING A NEW RX.     [facility]

## 2023-06-08 NOTE — TELEPHONE ENCOUNTER
PT CALLED STATING SOMEONE FROM OUR OFFICE TRIED CONTACTING HER AT HER CELL ([facility] PHONE #). PT REQUESTED WHEN WE CALL HER, TO CALL HER AT HER HOME #.

## 2023-07-25 ENCOUNTER — TELEPHONE (OUTPATIENT)
Dept: ENDOCRINOLOGY | Facility: CLINIC | Age: 48
End: 2023-07-25
Payer: COMMERCIAL

## 2023-07-25 DIAGNOSIS — Z79.4 TYPE 2 DIABETES MELLITUS WITH DIABETIC POLYNEUROPATHY, WITH LONG-TERM CURRENT USE OF INSULIN: ICD-10-CM

## 2023-07-25 DIAGNOSIS — E11.42 TYPE 2 DIABETES MELLITUS WITH DIABETIC POLYNEUROPATHY, WITH LONG-TERM CURRENT USE OF INSULIN: ICD-10-CM

## 2023-07-25 RX ORDER — PROCHLORPERAZINE 25 MG/1
1 SUPPOSITORY RECTAL TAKE AS DIRECTED
Qty: 1 EACH | Refills: 3 | Status: SHIPPED | OUTPATIENT
Start: 2023-07-25

## 2023-07-25 NOTE — TELEPHONE ENCOUNTER
A BARRERA WETZEL CALLED FOR THIS PT STATING HER TRANSMITTER FOR HER DEXCOM IS SHOWING ON READER THAT IS NEEDS TO BE REPLACCED. HE REQUESTED A CALL BACK TO CONSULT.

## 2023-08-16 ENCOUNTER — OFFICE VISIT (OUTPATIENT)
Dept: ENDOCRINOLOGY | Facility: CLINIC | Age: 48
End: 2023-08-16
Payer: COMMERCIAL

## 2023-08-16 VITALS
OXYGEN SATURATION: 98 % | BODY MASS INDEX: 33.61 KG/M2 | SYSTOLIC BLOOD PRESSURE: 124 MMHG | HEART RATE: 74 BPM | WEIGHT: 178 LBS | HEIGHT: 61 IN | DIASTOLIC BLOOD PRESSURE: 72 MMHG

## 2023-08-16 DIAGNOSIS — Z79.4 TYPE 2 DIABETES MELLITUS WITH DIABETIC POLYNEUROPATHY, WITH LONG-TERM CURRENT USE OF INSULIN: Primary | ICD-10-CM

## 2023-08-16 DIAGNOSIS — E11.42 TYPE 2 DIABETES MELLITUS WITH DIABETIC POLYNEUROPATHY, WITH LONG-TERM CURRENT USE OF INSULIN: Primary | ICD-10-CM

## 2023-08-16 LAB
EXPIRATION DATE: ABNORMAL
EXPIRATION DATE: NORMAL
GLUCOSE BLDC GLUCOMTR-MCNC: 164 MG/DL (ref 70–130)
HBA1C MFR BLD: 7.9 %
Lab: ABNORMAL
Lab: NORMAL

## 2023-08-16 PROCEDURE — 3078F DIAST BP <80 MM HG: CPT | Performed by: INTERNAL MEDICINE

## 2023-08-16 PROCEDURE — 3051F HG A1C>EQUAL 7.0%<8.0%: CPT | Performed by: INTERNAL MEDICINE

## 2023-08-16 PROCEDURE — 3074F SYST BP LT 130 MM HG: CPT | Performed by: INTERNAL MEDICINE

## 2023-08-16 PROCEDURE — 99214 OFFICE O/P EST MOD 30 MIN: CPT | Performed by: INTERNAL MEDICINE

## 2023-08-16 PROCEDURE — 95251 CONT GLUC MNTR ANALYSIS I&R: CPT | Performed by: INTERNAL MEDICINE

## 2023-08-16 PROCEDURE — 83036 HEMOGLOBIN GLYCOSYLATED A1C: CPT | Performed by: INTERNAL MEDICINE

## 2023-08-16 RX ORDER — INSULIN DETEMIR 100 [IU]/ML
34 INJECTION, SOLUTION SUBCUTANEOUS DAILY
Qty: 45 ML | Refills: 1 | Status: SHIPPED | OUTPATIENT
Start: 2023-08-16

## 2023-08-16 RX ORDER — DULAGLUTIDE 0.75 MG/.5ML
0.75 INJECTION, SOLUTION SUBCUTANEOUS
Qty: 2 ML | Refills: 5 | Status: SHIPPED | OUTPATIENT
Start: 2023-08-16

## 2023-08-16 RX ORDER — INSULIN LISPRO 100 [IU]/ML
INJECTION, SOLUTION INTRAVENOUS; SUBCUTANEOUS
Qty: 30 ML | Refills: 3 | Status: SHIPPED | OUTPATIENT
Start: 2023-08-16

## 2023-08-16 NOTE — PATIENT INSTRUCTIONS
Please have the eye exam report faxed to [phone].    Decrease levemir to 34 units daily.     Start taking humalog 4 units 15 minutes plus extra if your blood sugar is high.   If BG is 150-199: extra 1 unit  -249: extra 2 units  -299: extra 3 units  -349: extra 4 units  +: extra 5 units.      Start trulicity 0.75 mg once weekly. If you have stomach side effects, stop the medication.

## 2023-08-16 NOTE — PROGRESS NOTES
"Chief Complaint   Patient presents with    Diabetes          HPI   Rachana Patrick is a 48 y.o. female had concerns including Diabetes.    She is checking blood sugars 4+ times per day with dexcom. Data from 8/3/23 - 8/16/23 shows average BG 79%  Current medications for diabetes include levemir 38 units daily, humalog only occasionally if eating higher carb meals - maybe 5-7 units (at most a few times per week), jardiance 25 mg daily.  She ended up stopping the ozempic due to GI side effects. Recalls even on the lowest dose, she had side effects.     She doesn't feel like lantus works. Didn't like basaglar. Prefers to stay on levemir.     The following portions of the patient's history were reviewed and updated as appropriate: allergies, current medications, past family history, past medical history, past social history, past surgical history, and problem list.      Review of Systems   Constitutional:  Positive for unexpected weight gain.   Endocrine:        See HPI      Physical Exam  Vitals reviewed.   Constitutional:       Appearance: Normal appearance. She is obese.      Comments: Body mass index is 33.63 kg/mý.     Cardiovascular:      Rate and Rhythm: Normal rate.   Pulmonary:      Effort: Pulmonary effort is normal.   Neurological:      General: No focal deficit present.      Mental Status: She is alert. Mental status is at baseline.   Psychiatric:         Mood and Affect: Mood normal.         Behavior: Behavior normal.      /72   Pulse 74   Ht 154.9 cm (61\")   Wt 80.7 kg (178 lb)   SpO2 98%   BMI 33.63 kg/mý      Labs and imaging    CMP:  Lab Results   Component Value Date    BUN 16 11/29/2022    CREATININE 0.92 11/29/2022    BCR 17 11/29/2022     11/29/2022    K 4.6 11/29/2022    CO2 22 11/29/2022    CALCIUM 9.6 11/29/2022    PROTENTOTREF 7.3 11/29/2022    ALBUMIN 4.2 11/29/2022    LABGLOBREF 3.1 11/29/2022    LABIL2 1.4 11/29/2022    BILITOT 0.3 11/29/2022    ALKPHOS 137 (H) 11/29/2022 "    AST 35 11/29/2022    ALT 30 11/29/2022     Lipid Panel:  Lab Results   Component Value Date    CHOL 92 11/15/2022    TRIG 103 11/15/2022    HDL 29 (L) 11/15/2022    VLDL 20 11/15/2022    LDL 43 11/15/2022     HbA1c:  Lab Results   Component Value Date    HGBA1C 7.9 08/16/2023    HGBA1C 7.0 04/06/2023     Glucose:  Lab Results   Component Value Date    POCGLU 164 (A) 08/16/2023     Microalbumin:  Lab Results   Component Value Date    MALBCRERATIO 32.1 12/15/2022       Assessment and plan  Diagnoses and all orders for this visit:    1. Type 2 diabetes mellitus with diabetic polyneuropathy, with long-term current use of insulin (Primary)  Uncontrolled with hyperglycemia.  A1c up to 7.9.  Complicated by microalbuminuria and neuropathy.  No metformin due to history of GI intolerance.  Ozempic was discontinued due to GI intolerance.  She wants to try an alternate in the class.  Prescription for Trulicity was sent.  Discontinue if she has GI side effects.  Continue Jardiance 25 mg daily.  Continue Levemir but decreased to 34 units nightly.  Patient prefers to remain on Levemir, did not like Lantus or Basaglar.  Toujeo/Tresiba may not be on insurance formulary per chart review.  Resume Humalog 4 units prior to meals +1: 50 greater than 150.  Continue CGM.  Call the office if BG's are persistently elevated.  Labs are up-to-date from November.  Monofilament up-to-date from April.  Ophtho exam is up-to-date and requested the report be sent here.  -     POC Glucose, Blood  -     POC Glycosylated Hemoglobin (Hb A1C)  -     HumaLOG KwikPen 100 UNIT/ML solution pen-injector; INJECT 4 UNITS WITH MEALS PLUS 1:50>150, MAX DAILY DOSE 30 UNITS  Dispense: 30 mL; Refill: 3  -     insulin detemir (Levemir FlexTouch) 100 UNIT/ML injection; Inject 34 Units under the skin into the appropriate area as directed Daily.  Dispense: 45 mL; Refill: 1  -     Dulaglutide (Trulicity) 0.75 MG/0.5ML solution pen-injector; Inject 0.75 mg under the  skin into the appropriate area as directed Every 7 (Seven) Days.  Dispense: 2 mL; Refill: 5         Return in about 4 months (around 12/16/2023) for next scheduled follow up. The patient was instructed to contact the clinic with any interval questions or concerns.    Yuki Hughes, DO   Endocrinologist    Please note that portions of this note were completed with a voice recognition program.

## 2023-08-29 ENCOUNTER — OFFICE VISIT (OUTPATIENT)
Dept: FAMILY MEDICINE CLINIC | Facility: CLINIC | Age: 48
End: 2023-08-29
Payer: COMMERCIAL

## 2023-08-29 ENCOUNTER — HOSPITAL ENCOUNTER (OUTPATIENT)
Dept: GENERAL RADIOLOGY | Facility: HOSPITAL | Age: 48
Discharge: HOME | End: 2023-08-29
Admitting: FAMILY MEDICINE
Payer: COMMERCIAL

## 2023-08-29 VITALS
HEIGHT: 61 IN | RESPIRATION RATE: 20 BRPM | OXYGEN SATURATION: 99 % | HEART RATE: 86 BPM | WEIGHT: 174.6 LBS | TEMPERATURE: 97.8 F | DIASTOLIC BLOOD PRESSURE: 64 MMHG | BODY MASS INDEX: 32.97 KG/M2 | SYSTOLIC BLOOD PRESSURE: 120 MMHG

## 2023-08-29 DIAGNOSIS — J20.9 ACUTE BRONCHITIS, UNSPECIFIED ORGANISM: Primary | ICD-10-CM

## 2023-08-29 DIAGNOSIS — R05.1 ACUTE COUGH: ICD-10-CM

## 2023-08-29 PROCEDURE — 3078F DIAST BP <80 MM HG: CPT | Performed by: FAMILY MEDICINE

## 2023-08-29 PROCEDURE — 99213 OFFICE O/P EST LOW 20 MIN: CPT | Performed by: FAMILY MEDICINE

## 2023-08-29 PROCEDURE — 3051F HG A1C>EQUAL 7.0%<8.0%: CPT | Performed by: FAMILY MEDICINE

## 2023-08-29 PROCEDURE — 87428 SARSCOV & INF VIR A&B AG IA: CPT | Performed by: FAMILY MEDICINE

## 2023-08-29 PROCEDURE — 3074F SYST BP LT 130 MM HG: CPT | Performed by: FAMILY MEDICINE

## 2023-08-29 PROCEDURE — 71046 X-RAY EXAM CHEST 2 VIEWS: CPT

## 2023-08-29 RX ORDER — DOXYCYCLINE HYCLATE 100 MG/1
100 CAPSULE ORAL 2 TIMES DAILY
Qty: 14 CAPSULE | Refills: 0 | Status: SHIPPED | OUTPATIENT
Start: 2023-08-29

## 2023-08-29 RX ORDER — ALBUTEROL SULFATE 90 UG/1
AEROSOL, METERED RESPIRATORY (INHALATION)
Refills: 0 | Status: CANCELLED | OUTPATIENT
Start: 2023-08-29

## 2023-08-29 NOTE — PROGRESS NOTES
"Chief Complaint   Patient presents with    Cough    Sore Throat       Subjective      Rachana Patrick is a 48 y.o. who presents for 1 week of sore throat with voice loss and severe PND that has progressed to cough. No fevers or chills.     Objective   Vital Signs:  /64   Pulse 86   Temp 97.8 øF (36.6 øC)   Resp 20   Ht 154.9 cm (61\")   Wt 79.2 kg (174 lb 9.6 oz)   SpO2 99%   BMI 32.99 kg/mý     Physical Exam  Vitals reviewed.   Constitutional:       Appearance: Normal appearance.   HENT:      Head: Normocephalic and atraumatic.      Right Ear: Tympanic membrane and ear canal normal.      Left Ear: Tympanic membrane and ear canal normal.      Nose: Nose normal.      Mouth/Throat:      Mouth: Mucous membranes are moist.      Pharynx: Oropharynx is clear.   Eyes:      Conjunctiva/sclera: Conjunctivae normal.   Cardiovascular:      Rate and Rhythm: Normal rate and regular rhythm.      Heart sounds: Normal heart sounds. No murmur heard.  Pulmonary:      Effort: Pulmonary effort is normal. No respiratory distress.      Breath sounds: Examination of the right-middle field reveals rhonchi. Rhonchi present.   Abdominal:      General: Abdomen is flat. Bowel sounds are normal. There is no distension.      Palpations: Abdomen is soft.      Tenderness: There is no abdominal tenderness.   Musculoskeletal:      Cervical back: Normal range of motion and neck supple. No tenderness.   Lymphadenopathy:      Cervical: No cervical adenopathy.   Skin:     General: Skin is warm and dry.   Neurological:      Mental Status: She is alert.   Psychiatric:         Mood and Affect: Mood normal.        Result Review   The following data was reviewed by: Jerel Agudelo MD on 08/29/2023:    Data reviewed : POCT Sars/Flu negatige               Assessment and Plan  Diagnoses and all orders for this visit:    1. Acute bronchitis, unspecified organism (Primary)  -     doxycycline (VIBRAMYCIN) 100 MG capsule; Take 1 capsule " by mouth 2 (Two) Times a Day.  Dispense: 14 capsule; Refill: 0    2. Acute cough  -     POCT SARS-CoV-2 Antigen MEDINA  -     XR Chest PA & Lateral    Chest x-ray as she is smoker  Start Doxy        Follow Up  No follow-ups on file.  Patient was given instructions and counseling regarding her condition or for health maintenance advice. Please see specific information pulled into the AVS if appropriate.

## 2023-09-15 DIAGNOSIS — K59.04 CHRONIC IDIOPATHIC CONSTIPATION: ICD-10-CM

## 2023-09-19 RX ORDER — LINACLOTIDE 72 UG/1
CAPSULE, GELATIN COATED ORAL
Qty: 30 CAPSULE | Refills: 2 | OUTPATIENT
Start: 2023-09-19

## 2023-10-04 ENCOUNTER — TELEPHONE (OUTPATIENT)
Dept: FAMILY MEDICINE CLINIC | Facility: CLINIC | Age: 48
End: 2023-10-04
Payer: COMMERCIAL

## 2023-10-04 ENCOUNTER — OFFICE VISIT (OUTPATIENT)
Dept: FAMILY MEDICINE CLINIC | Facility: CLINIC | Age: 48
End: 2023-10-04
Payer: COMMERCIAL

## 2023-10-04 VITALS
OXYGEN SATURATION: 99 % | HEIGHT: 61 IN | RESPIRATION RATE: 18 BRPM | BODY MASS INDEX: 32.1 KG/M2 | DIASTOLIC BLOOD PRESSURE: 58 MMHG | HEART RATE: 78 BPM | TEMPERATURE: 98.1 F | WEIGHT: 170 LBS | SYSTOLIC BLOOD PRESSURE: 94 MMHG

## 2023-10-04 DIAGNOSIS — Z12.31 SCREENING MAMMOGRAM FOR BREAST CANCER: ICD-10-CM

## 2023-10-04 DIAGNOSIS — R22.1 THROAT SWELLING: Primary | ICD-10-CM

## 2023-10-04 DIAGNOSIS — M79.674 GREAT TOE PAIN, RIGHT: ICD-10-CM

## 2023-10-04 PROCEDURE — 3074F SYST BP LT 130 MM HG: CPT | Performed by: FAMILY MEDICINE

## 2023-10-04 PROCEDURE — 3051F HG A1C>EQUAL 7.0%<8.0%: CPT | Performed by: FAMILY MEDICINE

## 2023-10-04 PROCEDURE — 99214 OFFICE O/P EST MOD 30 MIN: CPT | Performed by: FAMILY MEDICINE

## 2023-10-04 PROCEDURE — 3078F DIAST BP <80 MM HG: CPT | Performed by: FAMILY MEDICINE

## 2023-10-04 RX ORDER — PREDNISONE 20 MG/1
40 TABLET ORAL DAILY
Qty: 10 TABLET | Refills: 0 | Status: SHIPPED | OUTPATIENT
Start: 2023-10-04

## 2023-10-04 NOTE — TELEPHONE ENCOUNTER
Caller: Rachana Patrick    Relationship: Self    Best call back number:      What is the best time to reach you: ANYTIME    Who are you requesting to speak with (clinical staff, provider,  specific staff member): CLINICAL STAFF    Do you know the name of the person who called: RACHANA    What was the call regarding: PATIENT CALLED TO CHECK APPT TIME, PATIENT DECLINED ANY COVD SYSTEMS; HOWEVER, HUB TRIED TO REACH PATIENT REGARDING HER REASON FOR THE VISIT, AND WAS UNSUCCESSFUL; HUB CALLED INTO OFFICE AND PER PAC, PATIENT WAS CLEAR TO GO FOR THE APPT AS THE APPT WAS MADE ON [id number]

## 2023-10-10 ENCOUNTER — TELEPHONE (OUTPATIENT)
Dept: FAMILY MEDICINE CLINIC | Facility: CLINIC | Age: 48
End: 2023-10-10
Payer: COMMERCIAL

## 2023-10-10 DIAGNOSIS — R22.1 THROAT SWELLING: ICD-10-CM

## 2023-10-10 DIAGNOSIS — R13.12 OROPHARYNGEAL DYSPHAGIA: Primary | ICD-10-CM

## 2023-10-10 NOTE — TELEPHONE ENCOUNTER
"Per Dr Solorzano:  \"any time a diabetic patient takes steroids their sugars are going to soar! this is a very normal thing when they are on steroids. I would complete the course of steroids. I can place ENT referral or we can give it a couple more days. I would recommend ENT if she is having any breathing or swallowing issues and just give it more time if not having significant issues.\"    Pt stated she would continue prednisone, and stated she's having issues swallowing. And when she lays down at night, she's choking on her own saliva because of the swelling. She stated she will complete Rx, but is still asking for referral if possible.  "

## 2023-10-10 NOTE — TELEPHONE ENCOUNTER
"Pt called prednisone is not helping her throat swelling, she has a couple days left of the med but is concerned because it is causing her to have elevated blood sugar readings (around 200) and feeling fatigued. Wondering if she needs to stop meds? Also wondering if she needs referral to ENT regarding swelling in her throat not getting any better. She still feels like something is \"stuck\".  "

## 2023-10-23 RX ORDER — AMLODIPINE BESYLATE 10 MG/1
10 TABLET ORAL DAILY
Qty: 90 TABLET | Refills: 1 | Status: SHIPPED | OUTPATIENT
Start: 2023-10-23

## 2023-11-21 ENCOUNTER — OFFICE VISIT (OUTPATIENT)
Dept: FAMILY MEDICINE CLINIC | Facility: CLINIC | Age: 48
End: 2023-11-21
Payer: COMMERCIAL

## 2023-11-21 ENCOUNTER — HOSPITAL ENCOUNTER (OUTPATIENT)
Dept: GENERAL RADIOLOGY | Facility: HOSPITAL | Age: 48
Discharge: HOME | End: 2023-11-21
Admitting: NURSE PRACTITIONER
Payer: COMMERCIAL

## 2023-11-21 VITALS
WEIGHT: 168 LBS | BODY MASS INDEX: 31.72 KG/M2 | TEMPERATURE: 98.1 F | DIASTOLIC BLOOD PRESSURE: 86 MMHG | SYSTOLIC BLOOD PRESSURE: 122 MMHG | OXYGEN SATURATION: 98 % | HEIGHT: 61 IN | RESPIRATION RATE: 20 BRPM | HEART RATE: 102 BPM

## 2023-11-21 DIAGNOSIS — R05.1 ACUTE COUGH: Primary | ICD-10-CM

## 2023-11-21 DIAGNOSIS — M25.572 ACUTE LEFT ANKLE PAIN: ICD-10-CM

## 2023-11-21 DIAGNOSIS — J01.00 ACUTE NON-RECURRENT MAXILLARY SINUSITIS: ICD-10-CM

## 2023-11-21 DIAGNOSIS — S82.62XA CLOSED AVULSION FRACTURE OF LATERAL MALLEOLUS OF LEFT FIBULA, INITIAL ENCOUNTER: ICD-10-CM

## 2023-11-21 PROCEDURE — 73610 X-RAY EXAM OF ANKLE: CPT

## 2023-11-21 RX ORDER — METHYLPREDNISOLONE 4 MG/1
TABLET ORAL
Qty: 21 TABLET | Refills: 0 | Status: SHIPPED | OUTPATIENT
Start: 2023-11-21

## 2023-11-21 RX ORDER — DEXTROMETHORPHAN HYDROBROMIDE AND PROMETHAZINE HYDROCHLORIDE 15; 6.25 MG/5ML; MG/5ML
5 SYRUP ORAL 4 TIMES DAILY PRN
Qty: 120 ML | Refills: 0 | Status: SHIPPED | OUTPATIENT
Start: 2023-11-21 | End: 2023-11-26

## 2023-11-21 RX ORDER — AZITHROMYCIN 250 MG/1
TABLET, FILM COATED ORAL
Qty: 6 TABLET | Refills: 0 | Status: SHIPPED | OUTPATIENT
Start: 2023-11-21

## 2023-11-21 NOTE — PROGRESS NOTES
Date: 2023   Patient Name: Rachana Patrick  : 1975   MRN: [id number]     Chief Complaint:    Chief Complaint   Patient presents with    Cough    Loss Of Taste       History of Present Illness: Rachana Patrick is a 48 y.o. female who is here today for Sore throat, congestion, cough, body aches, headaches, fatigue that has been present for about 5 days.   No fever, shortness ofbreath, chest pains, N/V/D, weird taste in mouth. Not taking any OTC medications for symptoms.     Left ankle swelling for the last 2 weeks, pain with walking, fell in a hole in the yard.         Review of Systems:   Review of Systems   Constitutional:  Positive for fever. Negative for activity change, chills and fatigue.   HENT:  Positive for congestion, postnasal drip, rhinorrhea, sinus pressure and sore throat. Negative for ear pain.    Eyes:  Negative for discharge and itching.   Respiratory:  Positive for cough. Negative for shortness of breath and wheezing.    Cardiovascular:  Negative for chest pain.   Gastrointestinal:  Negative for abdominal pain, constipation, diarrhea and nausea.   Genitourinary:  Negative for difficulty urinating and urinary incontinence.   Musculoskeletal:  Positive for arthralgias (left ankle). Negative for back pain, gait problem and myalgias.   Neurological:  Negative for headache.       Past Medical History:   Past Medical History:   Diagnosis Date    CVA (cerebral vascular accident) 10/22/2022    Hyperlipidemia     Hypertension     Type 2 diabetes mellitus        Past Surgical History: History reviewed. No pertinent surgical history.    Family History:   Family History   Problem Relation Age of Onset    Diabetes Mother     Hypertension Mother     Heart attack Mother     Diabetes Father     Hypertension Father        Social History:   Social History     Socioeconomic History    Marital status: Single   Tobacco Use    Smoking status: Every Day     Packs/day: 0.50     Years:  15.00     Additional pack years: 0.00     Total pack years: 7.50     Types: Cigarettes     Passive exposure: Current    Smokeless tobacco: Never   Vaping Use    Vaping Use: Never used   Substance and Sexual Activity    Alcohol use: Not Currently     Comment: social    Drug use: Not Currently    Sexual activity: Defer       Medications:     Current Outpatient Medications:     albuterol sulfate  (90 Base) MCG/ACT inhaler, INHALE 2 PUFFS PO Q 6 H PRN, Disp: , Rfl: 0    amLODIPine (NORVASC) 10 MG tablet, TAKE 1 TABLET BY MOUTH DAILY, Disp: 90 tablet, Rfl: 1    aspirin (Laura Aspirin) 325 MG tablet, Take 1 tablet by mouth Daily., Disp: 100 tablet, Rfl: 3    atorvastatin (Lipitor) 40 MG tablet, Take 1 tablet by mouth Daily., Disp: 30 tablet, Rfl: 11    Continuous Blood Gluc Sensor (Dexcom G6 Sensor), 1 each Take As Directed., Disp: 3 each, Rfl: 11    Continuous Blood Gluc Transmit (Dexcom G6 Transmitter) misc, 1 each Take As Directed., Disp: 1 each, Rfl: 3    cyclobenzaprine (FLEXERIL) 10 MG tablet, , Disp: , Rfl:     doxycycline (VIBRAMYCIN) 100 MG capsule, Take 1 capsule by mouth 2 (Two) Times a Day., Disp: 14 capsule, Rfl: 0    Dulaglutide (Trulicity) 0.75 MG/0.5ML solution pen-injector, Inject 0.75 mg under the skin into the appropriate area as directed Every 7 (Seven) Days., Disp: 2 mL, Rfl: 5    gentamicin (GARAMYCIN) 0.1 % ointment, , Disp: , Rfl:     glucose blood test strip, 1 each by Other route 4 (Four) Times a Day., Disp: 400 each, Rfl: 3    glucose monitor monitoring kit, 1 each 4 (Four) Times a Day., Disp: 1 each, Rfl: 0    HumaLOG KwikPen 100 UNIT/ML solution pen-injector, INJECT 4 UNITS WITH MEALS PLUS 1:50>150, MAX DAILY DOSE 30 UNITS, Disp: 30 mL, Rfl: 3    insulin detemir (Levemir FlexTouch) 100 UNIT/ML injection, Inject 34 Units under the skin into the appropriate area as directed Daily., Disp: 45 mL, Rfl: 1    Insulin Pen Needle 32G X 4 MM misc, 1 each 4 (Four) Times a Day., Disp: 400 each,  "Rfl: 3    Jardiance 25 MG tablet tablet, Take 1 tablet by mouth Daily., Disp: 30 tablet, Rfl: 11    Lancets (OneTouch Delica Plus [id number]) misc, 1 each by Other route 4 (Four) Times a Day., Disp: 400 each, Rfl: 3    losartan (COZAAR) 100 MG tablet, Take 1 tablet by mouth Daily. Hold unless SBP > 160, Disp: 90 tablet, Rfl: 1    pregabalin (LYRICA) 300 MG capsule, Take 1 capsule by mouth 2 (Two) Times a Day., Disp: 60 capsule, Rfl: 5    azithromycin (Zithromax Z-Yinka) 250 MG tablet, Take 2 tablets by mouth on day 1, then 1 tablet daily on days 2-5, Disp: 6 tablet, Rfl: 0    ketoconazole (NIZORAL) 2 % cream, , Disp: , Rfl:     methylPREDNISolone (MEDROL) 4 MG dose pack, Take as directed on package instructions., Disp: 21 tablet, Rfl: 0    promethazine-dextromethorphan (PROMETHAZINE-DM) 6.25-15 MG/5ML syrup, Take 5 mL by mouth 4 (Four) Times a Day As Needed for Cough for up to 5 days., Disp: 120 mL, Rfl: 0    Allergies:   Allergies   Allergen Reactions    Metformin GI Intolerance    Ozempic (0.25 Or 0.5 Mg-Dose) [Semaglutide(0.25 Or 0.5mg-Dos)] GI Intolerance         Physical Exam:  Vital Signs:   Vitals:    11/21/23 1007   BP: 122/86   Pulse: 102   Resp: 20   Temp: 98.1 °F (36.7 °C)   SpO2: 98%   Weight: 76.2 kg (168 lb)   Height: 154.9 cm (60.98\")   PainSc: 0-No pain     Body mass index is 31.76 kg/m².     Physical Exam  Vitals and nursing note reviewed.   Constitutional:       Appearance: She is not ill-appearing.   HENT:      Head: Normocephalic and atraumatic.      Right Ear: External ear normal. No middle ear effusion. Tympanic membrane is not erythematous or bulging.      Left Ear: External ear normal.  No middle ear effusion. Tympanic membrane is not erythematous or bulging.      Nose: Nose normal. Nasal tenderness and congestion present.      Right Turbinates: Not enlarged or swollen.      Left Turbinates: Not enlarged or swollen.      Right Sinus: Maxillary sinus tenderness present.      Left Sinus: Maxillary " sinus tenderness present.      Mouth/Throat:      Mouth: Mucous membranes are moist.      Pharynx: Posterior oropharyngeal erythema present. No pharyngeal swelling.      Tonsils: No tonsillar exudate.   Eyes:      Pupils: Pupils are equal, round, and reactive to light.   Cardiovascular:      Rate and Rhythm: Normal rate and regular rhythm.   Pulmonary:      Effort: Pulmonary effort is normal.      Breath sounds: Normal breath sounds.   Abdominal:      General: Bowel sounds are normal.   Musculoskeletal:      Cervical back: Normal range of motion and neck supple.      Left ankle: Swelling present. Tenderness present. Decreased range of motion.   Skin:     General: Skin is warm.   Neurological:      General: No focal deficit present.      Mental Status: She is alert and oriented to person, place, and time.   Psychiatric:         Mood and Affect: Mood normal.           Assessment/Plan:   Diagnoses and all orders for this visit:    1. Acute cough (Primary)  -     POCT SARS-CoV-2 Antigen MEDINA + Flu  -     azithromycin (Zithromax Z-Yinka) 250 MG tablet; Take 2 tablets by mouth on day 1, then 1 tablet daily on days 2-5  Dispense: 6 tablet; Refill: 0  -     methylPREDNISolone (MEDROL) 4 MG dose pack; Take as directed on package instructions.  Dispense: 21 tablet; Refill: 0  -     promethazine-dextromethorphan (PROMETHAZINE-DM) 6.25-15 MG/5ML syrup; Take 5 mL by mouth 4 (Four) Times a Day As Needed for Cough for up to 5 days.  Dispense: 120 mL; Refill: 0    2. Acute non-recurrent maxillary sinusitis  -     azithromycin (Zithromax Z-Yinka) 250 MG tablet; Take 2 tablets by mouth on day 1, then 1 tablet daily on days 2-5  Dispense: 6 tablet; Refill: 0  -     methylPREDNISolone (MEDROL) 4 MG dose pack; Take as directed on package instructions.  Dispense: 21 tablet; Refill: 0  -     promethazine-dextromethorphan (PROMETHAZINE-DM) 6.25-15 MG/5ML syrup; Take 5 mL by mouth 4 (Four) Times a Day As Needed for Cough for up to 5 days.   Dispense: 120 mL; Refill: 0    3. Acute left ankle pain  -     XR Ankle 3+ View Left; Future       Take medications as prescribed  Increase fluid intake  Monitor for worsening symptoms  Negative for covid and flu    Ordering an xray of ankle   Ice therapy  Ace bandage to aid in stabilization.     Follow Up:   Return if symptoms worsen or fail to improve.    Ruma Patel. LAZARA   [facility]

## 2023-11-29 ENCOUNTER — TELEPHONE (OUTPATIENT)
Dept: ENDOCRINOLOGY | Facility: CLINIC | Age: 48
End: 2023-11-29
Payer: COMMERCIAL

## 2023-11-29 ENCOUNTER — TELEPHONE (OUTPATIENT)
Dept: ORTHOPEDIC SURGERY | Facility: CLINIC | Age: 48
End: 2023-11-29

## 2023-11-29 ENCOUNTER — OFFICE VISIT (OUTPATIENT)
Dept: ORTHOPEDIC SURGERY | Facility: CLINIC | Age: 48
End: 2023-11-29
Payer: COMMERCIAL

## 2023-11-29 ENCOUNTER — TELEPHONE (OUTPATIENT)
Dept: FAMILY MEDICINE CLINIC | Facility: CLINIC | Age: 48
End: 2023-11-29
Payer: COMMERCIAL

## 2023-11-29 VITALS
SYSTOLIC BLOOD PRESSURE: 124 MMHG | DIASTOLIC BLOOD PRESSURE: 82 MMHG | WEIGHT: 167.99 LBS | HEIGHT: 61 IN | BODY MASS INDEX: 31.72 KG/M2

## 2023-11-29 DIAGNOSIS — S82.892A CLOSED FRACTURE OF LEFT ANKLE, INITIAL ENCOUNTER: Primary | ICD-10-CM

## 2023-11-29 NOTE — PATIENT INSTRUCTIONS
"Pneumatic Walking Boot        Even Up          Available on [employer], type in \"even up for walking boot\"     "

## 2023-11-29 NOTE — TELEPHONE ENCOUNTER
Caller: Rachana Patrick    Relationship: Self    Best call back number: [phone]     What form or medical record are you requesting: LETTER STATING NEED FOR HER SERVICE ANIMAL     Who is requesting this form or medical record from you: LANDLORDEWAYNE     How would you like to receive the form or medical records (pick-up, mail, fax):    Timeframe paperwork needed: ASAP    Additional notes: IS MOVING AND NEEDS SOMETHING STATING SHE NEEDS THE SERVICE DOG WITH HER. HAD THIS DOG FOR A WHILE, HELPS WITH HER CONFUSION AND IS A FOCUS POINT. CAN'T DO WITH OUT THE DOG. PLEASE CALL TO DISCUSS OR IF APPOINTMENT NEEDED FIRST. CALL WHEN READY TO

## 2023-11-29 NOTE — TELEPHONE ENCOUNTER
Trulicity can be increased monthly if she is tolerating.   With the steroids she needs more insulin. Increase humalog sliding scale to 2:50>150 every 4 hrs as needed. Can increase the based dose of mealtime insulin to 8 units.   If that doesn't help, increase sliding scale to 3:50>150. Will take a few days for steroid effects to wear off.

## 2023-11-29 NOTE — PROGRESS NOTES
[facility]opaedic Surgery Office Visit     Office Visit       Date: 11/29/2023   Patient Name: Rachana Patrick  MRN: [id number]  YOB: 1975    Referring Physician: Ruma Patel APRN     Chief Complaint:   Chief Complaint   Patient presents with    Left Ankle - Pain       History of Present Illness: Rachana Patrick is a 48 y.o. female who is here today left ankle pain.  Stepped in a hole and inverted ankle 3 weeks ago.  Has been weightbearing as tolerated in normal shoes since that time.  Has had persistent pain.  Has not improved during that time.  Patient works as a  is doing manual labor on a daily basis.  Patient smokes on a daily basis.  No other complaints.    Subjective   Review of Systems: Review of Systems   Constitutional: Negative.  Negative for chills, fatigue and fever.   HENT: Negative.  Negative for congestion and dental problem.    Eyes: Negative.  Negative for blurred vision.   Respiratory: Negative.  Negative for shortness of breath.    Cardiovascular: Negative.  Negative for leg swelling.   Gastrointestinal: Negative.  Negative for abdominal pain.   Endocrine: Negative.  Negative for polyuria.   Genitourinary: Negative.  Negative for difficulty urinating.   Musculoskeletal:  Positive for arthralgias.   Skin: Negative.    Allergic/Immunologic: Negative.    Neurological: Negative.    Hematological: Negative.  Negative for adenopathy.   Psychiatric/Behavioral: Negative.  Negative for behavioral problems.         Past Medical History:   Past Medical History:   Diagnosis Date    CVA (cerebral vascular accident) 10/22/2022    Hyperlipidemia     Hypertension     Type 2 diabetes mellitus        Past Surgical History: No past surgical history on file.    Family History:   Family History   Problem Relation Age of Onset    Diabetes Mother     Hypertension Mother     Heart attack Mother     Diabetes Father     Hypertension  Father        Social History:   Social History     Socioeconomic History    Marital status: Single   Tobacco Use    Smoking status: Every Day     Packs/day: 0.50     Years: 15.00     Additional pack years: 0.00     Total pack years: 7.50     Types: Cigarettes     Passive exposure: Current    Smokeless tobacco: Never   Vaping Use    Vaping Use: Never used   Substance and Sexual Activity    Alcohol use: Not Currently     Comment: social    Drug use: Not Currently    Sexual activity: Defer       Medications:   Current Outpatient Medications:     albuterol sulfate  (90 Base) MCG/ACT inhaler, INHALE 2 PUFFS PO Q 6 H PRN, Disp: , Rfl: 0    amLODIPine (NORVASC) 10 MG tablet, TAKE 1 TABLET BY MOUTH DAILY, Disp: 90 tablet, Rfl: 1    aspirin (Laura Aspirin) 325 MG tablet, Take 1 tablet by mouth Daily., Disp: 100 tablet, Rfl: 3    atorvastatin (Lipitor) 40 MG tablet, Take 1 tablet by mouth Daily., Disp: 30 tablet, Rfl: 11    azithromycin (Zithromax Z-Yinka) 250 MG tablet, Take 2 tablets by mouth on day 1, then 1 tablet daily on days 2-5, Disp: 6 tablet, Rfl: 0    Continuous Blood Gluc Sensor (Dexcom G6 Sensor), 1 each Take As Directed., Disp: 3 each, Rfl: 11    Continuous Blood Gluc Transmit (Dexcom G6 Transmitter) misc, 1 each Take As Directed., Disp: 1 each, Rfl: 3    cyclobenzaprine (FLEXERIL) 10 MG tablet, , Disp: , Rfl:     doxycycline (VIBRAMYCIN) 100 MG capsule, Take 1 capsule by mouth 2 (Two) Times a Day., Disp: 14 capsule, Rfl: 0    Dulaglutide (Trulicity) 0.75 MG/0.5ML solution pen-injector, Inject 0.75 mg under the skin into the appropriate area as directed Every 7 (Seven) Days., Disp: 2 mL, Rfl: 5    gentamicin (GARAMYCIN) 0.1 % ointment, , Disp: , Rfl:     glucose blood test strip, 1 each by Other route 4 (Four) Times a Day., Disp: 400 each, Rfl: 3    glucose monitor monitoring kit, 1 each 4 (Four) Times a Day., Disp: 1 each, Rfl: 0    HumaLOG KwikPen 100 UNIT/ML solution pen-injector, INJECT 4 UNITS WITH  "MEALS PLUS 1:50>150, MAX DAILY DOSE 30 UNITS, Disp: 30 mL, Rfl: 3    insulin detemir (Levemir FlexTouch) 100 UNIT/ML injection, Inject 34 Units under the skin into the appropriate area as directed Daily., Disp: 45 mL, Rfl: 1    Insulin Pen Needle 32G X 4 MM misc, 1 each 4 (Four) Times a Day., Disp: 400 each, Rfl: 3    Jardiance 25 MG tablet tablet, Take 1 tablet by mouth Daily., Disp: 30 tablet, Rfl: 11    ketoconazole (NIZORAL) 2 % cream, , Disp: , Rfl:     Lancets (OneTouch Delica Plus [id number]) misc, 1 each by Other route 4 (Four) Times a Day., Disp: 400 each, Rfl: 3    losartan (COZAAR) 100 MG tablet, Take 1 tablet by mouth Daily. Hold unless SBP > 160, Disp: 90 tablet, Rfl: 1    methylPREDNISolone (MEDROL) 4 MG dose pack, Take as directed on package instructions., Disp: 21 tablet, Rfl: 0    pregabalin (LYRICA) 300 MG capsule, Take 1 capsule by mouth 2 (Two) Times a Day., Disp: 60 capsule, Rfl: 5    Allergies:   Allergies   Allergen Reactions    Metformin GI Intolerance    Ozempic (0.25 Or 0.5 Mg-Dose) [Semaglutide(0.25 Or 0.5mg-Dos)] GI Intolerance       I reviewed the patient's chief complaint, history of present illness, review of systems, past medical history, surgical history, family history, social history, medications and allergy list.     Objective    Vital Signs:   Vitals:    11/29/23 1359   BP: 124/82   Weight: 76.2 kg (167 lb 15.9 oz)   Height: 154.9 cm (60.98\")     Body mass index is 31.76 kg/m².    Ortho Exam:  left LE Foot and Ankle Exam:   Antalgic gait pattern.   There is good perfusion to the toes.   The skin is intact throughout the foot and ankle without ulceration.   Range of motion of ankle, subtalar joint, midfoot and toes is within normal limits.   There is tenderness to palpation over the tip of the distal fibula, no tenderness medially over deltoid ligament, no tenderness to palpation over the proximal fibula, visible swelling about the ankle with ecchymosis lateral hindfoot, no " increase in pain with syndesmotic stress.      Results Review:   XR ANKLE 3+ VW LEFT     Date of Exam: 11/21/2023 11:04 AM EST     Indication: ankle swelling and pain, trauma 2 weeks ago     Comparison: None available.     Findings:  3 views. There is mild inferior calcaneal spurring. The ankle joint is maintained. There is a mildly displaced and mildly comminuted avulsion fracture from the edge of the lateral malleolus. There is faint vascular calcification.     IMPRESSION:  Impression:  Mildly displaced and mildly comminuted lateral malleolar avulsion.        Electronically Signed: Connie Recinos MD    11/21/2023 11:16 AM EST    Workstation ID: [id number]      11/29/23 I have personally reviewed and interpreted the images from outside facility with the documented findings, distal fibular fracture      Assessment / Plan    Assessment/Plan:   Diagnoses and all orders for this visit:    1. Closed fracture of left ankle, initial encounter (Primary)      Discussed fracture (an injury with risk of long term functional impairment) with patient as well as treatment options at length. Decision regarding surgical intervention considered. Patient is not a candidate due to trial of nonoperative management. Also reviewed external note and imaging studies from November 21. Plan for non-operative management in a CAM walking boot, boot provided in clinic today. Patient can WBAT in boot using assistive devices PRN.  Also counseled patient to avoid any activities that reproduce pain at her fracture site.  Encouraged to ice and elevate during recovery. Recommend Tylenol for pain. Follow up in clinic in 4 weeks for repeat xrays and re-evaluation. In this visit the patient was advised to stop smoking. I also counseled patient on the harmful effects of smoking with regard to increasing the risk of complications after foot and ankle surgery as well as increased risk of non-union after fracture.  Patient expressed understanding as well as  expression that they would attempt to cut back.      Discussed with patient that a small percentage of patients with this injury go on to develop a malunion/nonunion. The likely recommended surgical procedure would be an ORIF with augmentation if that were to happen.  The risks of such a procedure would include but are not limited to infection, neurovascular damage, hardware failure, stiffness, etc.         Follow Up:   Return in about 4 weeks (around 12/27/2023) for Recheck, F/U with Images.      Roni Adamson MD  [facility] Orthopedic Surgeon

## 2023-11-29 NOTE — TELEPHONE ENCOUNTER
Patient was seen today, 11/29. She is requesting a work note to be off, or have limited use of her left foot/ankle.     Dr Adamson or team please advise.

## 2023-11-29 NOTE — TELEPHONE ENCOUNTER
Pt called stated her blood sugar  has been running from 390 to 400. Pt has dexcom g6 sensor not shareable. Pt has pneumonia and put on prednisone 4mg on 11/21/23. Pt is still taking medication but wanting to stop due to blood sugar spiking. Pt also taking Trulicity 0.75 mg. Pt wanting to know if this will ever increase. Pt last ov 08/16/23 pt next ov 01/23/24

## 2023-11-30 DIAGNOSIS — Z79.4 TYPE 2 DIABETES MELLITUS WITH DIABETIC POLYNEUROPATHY, WITH LONG-TERM CURRENT USE OF INSULIN: Primary | ICD-10-CM

## 2023-11-30 DIAGNOSIS — E11.42 TYPE 2 DIABETES MELLITUS WITH DIABETIC POLYNEUROPATHY, WITH LONG-TERM CURRENT USE OF INSULIN: Primary | ICD-10-CM

## 2023-11-30 RX ORDER — DULAGLUTIDE 1.5 MG/.5ML
1.5 INJECTION, SOLUTION SUBCUTANEOUS
Qty: 2 ML | Refills: 1 | Status: SHIPPED | OUTPATIENT
Start: 2023-11-30

## 2023-11-30 NOTE — TELEPHONE ENCOUNTER
Patient notified and verbalized understanding.  She is requesting increased dose of trulicity to be sent in.

## 2023-12-04 ENCOUNTER — OFFICE VISIT (OUTPATIENT)
Dept: FAMILY MEDICINE CLINIC | Facility: CLINIC | Age: 48
End: 2023-12-04
Payer: COMMERCIAL

## 2023-12-04 VITALS
TEMPERATURE: 97.7 F | HEIGHT: 61 IN | BODY MASS INDEX: 31.76 KG/M2 | HEART RATE: 98 BPM | RESPIRATION RATE: 20 BRPM | DIASTOLIC BLOOD PRESSURE: 70 MMHG | SYSTOLIC BLOOD PRESSURE: 120 MMHG | OXYGEN SATURATION: 100 %

## 2023-12-04 DIAGNOSIS — I69.30 HISTORY OF STROKE WITH RESIDUAL DEFICIT: ICD-10-CM

## 2023-12-04 DIAGNOSIS — F41.8 OTHER SPECIFIED ANXIETY DISORDERS: Primary | ICD-10-CM

## 2023-12-04 PROCEDURE — 99213 OFFICE O/P EST LOW 20 MIN: CPT | Performed by: FAMILY MEDICINE

## 2023-12-04 PROCEDURE — 3074F SYST BP LT 130 MM HG: CPT | Performed by: FAMILY MEDICINE

## 2023-12-04 PROCEDURE — 3051F HG A1C>EQUAL 7.0%<8.0%: CPT | Performed by: FAMILY MEDICINE

## 2023-12-04 PROCEDURE — 3078F DIAST BP <80 MM HG: CPT | Performed by: FAMILY MEDICINE

## 2023-12-04 NOTE — PROGRESS NOTES
"Chief Complaint   Patient presents with    discuss anxiety and service animal        Subjective      Rachana Patrick is a 48 y.o. who presents for anxiety which is currently managed through stress management techniques and her comfort of her ferreira doodle, Peter. She will be changing residences and her new landlord is requiring a statement from the patient's physician stating she needs her animal for emotional support. Not only does Peter provide emotional support but he is her focal point when she feels overwhelmed or \"chaotic\" and helps her return her focus to tasks within the home.     The following portions of the patient's history were reviewed and updated as appropriate: allergies, current medications, past family history, past medical history, past social history, past surgical history, and problem list.    Review of Systems    Objective   Vital Signs:  /70   Pulse 98   Temp 97.7 °F (36.5 °C)   Resp 20   Ht 154.9 cm (60.98\")   SpO2 100%   BMI 31.76 kg/m²               Physical Exam  Vitals reviewed.   Neurological:      Mental Status: She is alert.   Psychiatric:         Attention and Perception: Attention normal.         Mood and Affect: Affect is tearful.         Speech: Speech is rapid and pressured.         Judgment: Judgment normal.          Result Review                     Assessment and Plan  Diagnoses and all orders for this visit:    1. Other specified anxiety disorders (Primary)    2. History of stroke with residual deficit    Patient was given a letter regarding the benefits to her physical and mental health of having her pet dog for support.         Follow Up  No follow-ups on file.  Patient was given instructions and counseling regarding her condition or for health maintenance advice. Please see specific information pulled into the AVS if appropriate.       "

## 2023-12-04 NOTE — LETTER
December 4, 2023     Patient: Rachana Patrick   YOB: 1975   Date of Visit: 12/4/2023       To Whom It May Concern:     Rachana Patrick suffered a stroke in 2022. As a result of the stroke she has a difficult time focusing and concentrating on tasks within her home resulting getting lost in within her home. After purchasing a pet dog she has had improvements in her mental health and improvement in her ability to focus and concentrate on tasks within her home. Her mental and physical health benefit from the presence of her pet Peter Franks. Separation from her dog could have detrimental effects on her health.         Sincerely,        Jerel Agudelo MD    CC: No Recipients

## 2024-01-15 DIAGNOSIS — E11.65 TYPE 2 DIABETES MELLITUS WITH HYPERGLYCEMIA, WITH LONG-TERM CURRENT USE OF INSULIN: ICD-10-CM

## 2024-01-15 DIAGNOSIS — Z79.4 TYPE 2 DIABETES MELLITUS WITH HYPERGLYCEMIA, WITH LONG-TERM CURRENT USE OF INSULIN: ICD-10-CM

## 2024-01-15 DIAGNOSIS — Z79.4 LONG-TERM INSULIN USE: ICD-10-CM

## 2024-01-16 ENCOUNTER — TELEPHONE (OUTPATIENT)
Dept: ENDOCRINOLOGY | Facility: CLINIC | Age: 49
End: 2024-01-16
Payer: COMMERCIAL

## 2024-01-16 DIAGNOSIS — Z79.4 TYPE 2 DIABETES MELLITUS WITH DIABETIC POLYNEUROPATHY, WITH LONG-TERM CURRENT USE OF INSULIN: ICD-10-CM

## 2024-01-16 DIAGNOSIS — E11.42 TYPE 2 DIABETES MELLITUS WITH DIABETIC POLYNEUROPATHY, WITH LONG-TERM CURRENT USE OF INSULIN: ICD-10-CM

## 2024-01-16 NOTE — TELEPHONE ENCOUNTER
Pt called saying she is out of her pen needles. She said she had to switch pharmacys and now she is wanting everything sent to the pharmacy in [place]. She said she will need a PA sent to them. She also asked if she could get a smaller gauge of needles because the size she has now bruises her.

## 2024-01-18 ENCOUNTER — TELEPHONE (OUTPATIENT)
Dept: ENDOCRINOLOGY | Facility: CLINIC | Age: 49
End: 2024-01-18
Payer: COMMERCIAL

## 2024-01-18 NOTE — TELEPHONE ENCOUNTER
PT CALLED REQUESTING PEN NEEDLE RX's TO BE SENT IN TO [facility] IN [place], [place].     PT IS OUT OF RX           Rx sent per protocol.

## 2024-01-18 NOTE — TELEPHONE ENCOUNTER
PT CALLED REQUESTING PEN NEEDLE RX's TO BE SENT IN TO [facility] IN [place], [place].     PT IS OUT OF RX

## 2024-01-23 ENCOUNTER — OFFICE VISIT (OUTPATIENT)
Dept: ENDOCRINOLOGY | Facility: CLINIC | Age: 49
End: 2024-01-23
Payer: COMMERCIAL

## 2024-01-23 VITALS
HEIGHT: 61 IN | DIASTOLIC BLOOD PRESSURE: 68 MMHG | HEART RATE: 90 BPM | SYSTOLIC BLOOD PRESSURE: 124 MMHG | OXYGEN SATURATION: 99 % | WEIGHT: 162 LBS | BODY MASS INDEX: 30.58 KG/M2

## 2024-01-23 DIAGNOSIS — Z79.4 TYPE 2 DIABETES MELLITUS WITH DIABETIC POLYNEUROPATHY, WITH LONG-TERM CURRENT USE OF INSULIN: Primary | ICD-10-CM

## 2024-01-23 DIAGNOSIS — E11.42 TYPE 2 DIABETES MELLITUS WITH DIABETIC POLYNEUROPATHY, WITH LONG-TERM CURRENT USE OF INSULIN: Primary | ICD-10-CM

## 2024-01-23 LAB
ALBUMIN SERPL-MCNC: 4.6 G/DL (ref 3.5–5.2)
ALBUMIN UR-MCNC: <1.2 MG/DL
ALBUMIN/GLOB SERPL: 1.8 G/DL
ALP SERPL-CCNC: 136 U/L (ref 39–117)
ALT SERPL W P-5'-P-CCNC: 21 U/L (ref 1–33)
ANION GAP SERPL CALCULATED.3IONS-SCNC: 10 MMOL/L (ref 5–15)
AST SERPL-CCNC: 22 U/L (ref 1–32)
BILIRUB SERPL-MCNC: 0.3 MG/DL (ref 0–1.2)
BUN SERPL-MCNC: 15 MG/DL (ref 6–20)
BUN/CREAT SERPL: 19.5 (ref 7–25)
CALCIUM SPEC-SCNC: 10.7 MG/DL (ref 8.6–10.5)
CHLORIDE SERPL-SCNC: 104 MMOL/L (ref 98–107)
CHOLEST SERPL-MCNC: 126 MG/DL (ref 0–200)
CO2 SERPL-SCNC: 28 MMOL/L (ref 22–29)
CREAT SERPL-MCNC: 0.77 MG/DL (ref 0.57–1)
CREAT UR-MCNC: 50.7 MG/DL
DEPRECATED RDW RBC AUTO: 38.9 FL (ref 37–54)
EGFRCR SERPLBLD CKD-EPI 2021: 94.1 ML/MIN/1.73
ERYTHROCYTE [DISTWIDTH] IN BLOOD BY AUTOMATED COUNT: 12.4 % (ref 12.3–15.4)
EXPIRATION DATE: ABNORMAL
EXPIRATION DATE: ABNORMAL
GLOBULIN UR ELPH-MCNC: 2.6 GM/DL
GLUCOSE BLDC GLUCOMTR-MCNC: 266 MG/DL (ref 70–130)
GLUCOSE SERPL-MCNC: 166 MG/DL (ref 65–99)
HBA1C MFR BLD: 7.5 % (ref 4.5–5.7)
HCT VFR BLD AUTO: 45.1 % (ref 34–46.6)
HDLC SERPL-MCNC: 40 MG/DL (ref 40–60)
HGB BLD-MCNC: 15.2 G/DL (ref 12–15.9)
LDLC SERPL CALC-MCNC: 60 MG/DL (ref 0–100)
LDLC/HDLC SERPL: 1.4 {RATIO}
Lab: ABNORMAL
Lab: ABNORMAL
MCH RBC QN AUTO: 29.1 PG (ref 26.6–33)
MCHC RBC AUTO-ENTMCNC: 33.7 G/DL (ref 31.5–35.7)
MCV RBC AUTO: 86.4 FL (ref 79–97)
MICROALBUMIN/CREAT UR: NORMAL MG/G{CREAT}
PLATELET # BLD AUTO: 281 10*3/MM3 (ref 140–450)
PMV BLD AUTO: 10.7 FL (ref 6–12)
POTASSIUM SERPL-SCNC: 5 MMOL/L (ref 3.5–5.2)
PROT SERPL-MCNC: 7.2 G/DL (ref 6–8.5)
RBC # BLD AUTO: 5.22 10*6/MM3 (ref 3.77–5.28)
SODIUM SERPL-SCNC: 142 MMOL/L (ref 136–145)
TRIGL SERPL-MCNC: 151 MG/DL (ref 0–150)
TSH SERPL DL<=0.05 MIU/L-ACNC: 0.84 UIU/ML (ref 0.27–4.2)
VLDLC SERPL-MCNC: 26 MG/DL (ref 5–40)
WBC NRBC COR # BLD AUTO: 10.54 10*3/MM3 (ref 3.4–10.8)

## 2024-01-23 PROCEDURE — 82043 UR ALBUMIN QUANTITATIVE: CPT | Performed by: INTERNAL MEDICINE

## 2024-01-23 PROCEDURE — 99214 OFFICE O/P EST MOD 30 MIN: CPT | Performed by: INTERNAL MEDICINE

## 2024-01-23 PROCEDURE — 80050 GENERAL HEALTH PANEL: CPT | Performed by: INTERNAL MEDICINE

## 2024-01-23 PROCEDURE — 1159F MED LIST DOCD IN RCRD: CPT | Performed by: INTERNAL MEDICINE

## 2024-01-23 PROCEDURE — 3051F HG A1C>EQUAL 7.0%<8.0%: CPT | Performed by: INTERNAL MEDICINE

## 2024-01-23 PROCEDURE — 3078F DIAST BP <80 MM HG: CPT | Performed by: INTERNAL MEDICINE

## 2024-01-23 PROCEDURE — 83036 HEMOGLOBIN GLYCOSYLATED A1C: CPT | Performed by: INTERNAL MEDICINE

## 2024-01-23 PROCEDURE — 3074F SYST BP LT 130 MM HG: CPT | Performed by: INTERNAL MEDICINE

## 2024-01-23 PROCEDURE — 80061 LIPID PANEL: CPT | Performed by: INTERNAL MEDICINE

## 2024-01-23 PROCEDURE — 95251 CONT GLUC MNTR ANALYSIS I&R: CPT | Performed by: INTERNAL MEDICINE

## 2024-01-23 PROCEDURE — 1160F RVW MEDS BY RX/DR IN RCRD: CPT | Performed by: INTERNAL MEDICINE

## 2024-01-23 PROCEDURE — 82570 ASSAY OF URINE CREATININE: CPT | Performed by: INTERNAL MEDICINE

## 2024-01-23 RX ORDER — ACYCLOVIR 400 MG/1
1 TABLET ORAL
Qty: 9 EACH | Refills: 3 | Status: SHIPPED | OUTPATIENT
Start: 2024-01-23

## 2024-01-23 RX ORDER — INSULIN DETEMIR 100 [IU]/ML
23 INJECTION, SOLUTION SUBCUTANEOUS DAILY
Qty: 30 ML | Refills: 1 | Status: SHIPPED | OUTPATIENT
Start: 2024-01-23

## 2024-01-23 RX ORDER — EMPAGLIFLOZIN 25 MG/1
25 TABLET, FILM COATED ORAL DAILY
Qty: 90 TABLET | Refills: 3 | Status: SHIPPED | OUTPATIENT
Start: 2024-01-23

## 2024-01-23 NOTE — PATIENT INSTRUCTIONS
Please have the eye exam report faxed to [phone].    Stop trulicity. Start mounjaro. Dose can be increased monthly as tolerated. Call the office for increases.

## 2024-01-23 NOTE — PROGRESS NOTES
Chief Complaint   Patient presents with    Diabetes     Type 2 diabetes mellitus with hyperglycemia, with long-term current use of insulin          HPI   Rachana Patrick is a 48 y.o. female had concerns including Diabetes (Type 2 diabetes mellitus with hyperglycemia, with long-term current use of insulin).    She is checking blood sugars 4+ times per day with CGM. Data reviewed from the last two weeks shows average glucose 137 with variability of 23.6%. In target range 89%, high 10%, very high 0%, low 1%, very low <1%.   Pattern of: occasional postprandial hyperglycemia.     Current medications for diabetes include Jardiance 25 mg daily, Trulicity 1.5 mg weekly, Levemir 23 units daily, rarely using humalog  Issues with supply of the trulicity. Has been off for a week.      The following portions of the patient's history were reviewed and updated as appropriate: allergies, current medications, past family history, past medical history, past social history, past surgical history, and problem list.      Review of Systems   Constitutional: Negative.    Endocrine: Negative.         See HPI        Physical Exam  Vitals reviewed.   Constitutional:       Appearance: Normal appearance. She is obese.      Comments: Body mass index is 30.61 kg/m².   Cardiovascular:      Rate and Rhythm: Normal rate.      Pulses:           Dorsalis pedis pulses are 1+ on the right side and 1+ on the left side.   Pulmonary:      Effort: Pulmonary effort is normal.   Feet:      Right foot:      Skin integrity: Callus and dry skin present.      Toenail Condition: Right toenails are abnormally thick. Fungal disease present.     Left foot:      Skin integrity: Callus and dry skin present.      Toenail Condition: Left toenails are abnormally thick. Fungal disease present.     Comments: Diabetic Foot Exam Performed and Monofilament Test Performed    Monofilament 0-1/5 bilaterally    Neurological:      General: No focal deficit present.       "Mental Status: She is alert. Mental status is at baseline.   Psychiatric:         Mood and Affect: Mood normal.         Behavior: Behavior normal.        /68 (BP Location: Right arm, Patient Position: Sitting, Cuff Size: Adult)   Pulse 90   Ht 154.9 cm (61\")   Wt 73.5 kg (162 lb)   SpO2 99%   BMI 30.61 kg/m²      Labs and imaging    CMP:  Lab Results   Component Value Date    BUN 16 11/29/2022    CREATININE 0.92 11/29/2022    EGFR 82.9 11/15/2022    BCR 17 11/29/2022     11/29/2022    K 4.6 11/29/2022    CO2 22 11/29/2022    CALCIUM 9.6 11/29/2022    ALBUMIN 4.2 11/29/2022    BILITOT 0.3 11/29/2022    ALKPHOS 137 (H) 11/29/2022    AST 35 11/29/2022    ALT 30 11/29/2022     Lipid Panel:  Lab Results   Component Value Date    CHOL 92 11/15/2022    TRIG 103 11/15/2022    HDL 29 (L) 11/15/2022    VLDL 20 11/15/2022    LDL 43 11/15/2022     HbA1c:  Lab Results   Component Value Date    HGBA1C 7.5 (A) 01/23/2024    HGBA1C 7.9 08/16/2023     Glucose:    Lab Results   Component Value Date    POCGLU 266 (A) 01/23/2024     Microalbumin:  Lab Results   Component Value Date    MALBCRERATIO 32.1 12/15/2022     Assessment and plan  Diagnoses and all orders for this visit:    1. Type 2 diabetes mellitus with diabetic polyneuropathy, with long-term current use of insulin (Primary)  Uncontrolled with A1c 7.5. Some hyperglycemia, occasional nocturnal hypoglycemia in recent months. Complicated by neuropathy and microalbuminuria.   No metformin due to GI intolerance.   Didn't tolerate ozempic. Doing ok on trulicity but supply issues at the moment. Change from 1.5 mg trulicity to starting dose of mounjaro. Increase monthly as tolerated.   Stay on jardiance.   Decrease levemir as able. Continue 23 units for now. Patient prefers to remain on Levemir, did not like Lantus or Basaglar.    Labs today. MF updated today. Ophtho exam is UTD and pt will have the report sent here.   -     POC Glycosylated Hemoglobin (Hb A1C)  -    "  POC Glucose, Blood  -     insulin detemir (Levemir FlexTouch) 100 UNIT/ML injection; Inject 23 Units under the skin into the appropriate area as directed Daily.  Dispense: 30 mL; Refill: 1  -     Tirzepatide (Mounjaro) 2.5 MG/0.5ML solution pen-injector pen; Inject 0.5 mL under the skin into the appropriate area as directed Every 7 (Seven) Days.  Dispense: 2 mL; Refill: 0  -     Jardiance 25 MG tablet tablet; Take 1 tablet by mouth Daily.  Dispense: 90 tablet; Refill: 3  -     CBC (No Diff)  -     Comprehensive Metabolic Panel  -     Lipid Panel  -     Microalbumin / Creatinine Urine Ratio - Urine, Clean Catch  -     TSH  -     Continuous Blood Gluc Sensor (Dexcom G7 Sensor) misc; Use 1 each Every 10 (Ten) Days.  Dispense: 9 each; Refill: 3         Return in about 4 months (around 5/23/2024) for next scheduled follow up. The patient was instructed to contact the clinic with any interval questions or concerns.    Yuki Hughes, DO   Endocrinologist    Please note that portions of this note were completed with a voice recognition program.

## 2024-01-25 ENCOUNTER — PRIOR AUTHORIZATION (OUTPATIENT)
Dept: ENDOCRINOLOGY | Facility: CLINIC | Age: 49
End: 2024-01-25
Payer: COMMERCIAL

## 2024-01-25 NOTE — TELEPHONE ENCOUNTER
Rachana Patrick (Key: [id number])  PA Case ID #: [id number]  Rx #: [id number]  Need Help? Call us at [phone]  Outcome  Approved today  The request has been approved. The authorization is effective from 01/25/2024 to 01/24/2025, as long as the member is enrolled in their current health plan. The request was approved as submitted. A written notification letter will follow with additional details.  Authorization Expiration Date: 1/23/2025  Drug  Mounjaro 2.5MG/0.5ML pen-injectors  ePA cloud logo  Form  [employer] [place] Medicaid ePA Form 2017 NCP[place]

## 2024-02-02 DIAGNOSIS — Z79.4 TYPE 2 DIABETES MELLITUS WITH HYPERGLYCEMIA, WITH LONG-TERM CURRENT USE OF INSULIN: ICD-10-CM

## 2024-02-02 DIAGNOSIS — E11.65 TYPE 2 DIABETES MELLITUS WITH HYPERGLYCEMIA, WITH LONG-TERM CURRENT USE OF INSULIN: ICD-10-CM

## 2024-02-02 RX ORDER — BLOOD-GLUCOSE METER
1 KIT MISCELLANEOUS 4 TIMES DAILY
Qty: 1 EACH | Refills: 0 | Status: SHIPPED | OUTPATIENT
Start: 2024-02-02

## 2024-02-02 NOTE — TELEPHONE ENCOUNTER
Caller: Rachana Patrick    Relationship: Self    Best call back number: [phone] (home)       Requested Prescriptions:   Requested Prescriptions     Pending Prescriptions Disp Refills    glucose monitor monitoring kit 1 each 0     Sig: Use 1 each 4 (Four) Times a Day.        Pharmacy where request should be sent:  [facility] IN [place]    Last office visit with prescribing clinician: 1/23/2024   Last telemedicine visit with prescribing clinician: Visit date not found   Next office visit with prescribing clinician: 6/26/2024     Additional details provided by patient: PT ALSO NEEDS SUPPLIES TO GO WITH IT, OLD MACHINE IS BROKE  PT IS ALSO HAVING ISSUES WITH MOUNJARO AND WOULD LKE TO KNOW IF SHE CAN TAKE IT IN HER LEG, PLEASE CALL PT TO ADVISE.     Does the patient have less than a 3 day supply:  [x] Yes  [] No    Would you like a call back once the refill request has been completed: [] Yes [] No    If the office needs to give you a call back, can they leave a voicemail: [] Yes [] No    Pj Villarreal Rep   02/02/24 13:14 EST

## 2024-02-13 ENCOUNTER — TELEPHONE (OUTPATIENT)
Dept: FAMILY MEDICINE CLINIC | Facility: CLINIC | Age: 49
End: 2024-02-13
Payer: COMMERCIAL

## 2024-02-13 DIAGNOSIS — K59.03 DRUG-INDUCED CONSTIPATION: Primary | ICD-10-CM

## 2024-02-13 RX ORDER — POLYETHYLENE GLYCOL 3350 17 G/17G
17 POWDER, FOR SOLUTION ORAL DAILY
Qty: 850 G | Refills: 2 | Status: SHIPPED | OUTPATIENT
Start: 2024-02-13 | End: 2024-02-15 | Stop reason: SDUPTHER

## 2024-02-15 ENCOUNTER — TELEPHONE (OUTPATIENT)
Dept: ENDOCRINOLOGY | Facility: CLINIC | Age: 49
End: 2024-02-15
Payer: COMMERCIAL

## 2024-02-15 ENCOUNTER — OFFICE VISIT (OUTPATIENT)
Dept: FAMILY MEDICINE CLINIC | Facility: CLINIC | Age: 49
End: 2024-02-15
Payer: COMMERCIAL

## 2024-02-15 VITALS
HEIGHT: 61 IN | WEIGHT: 158.6 LBS | OXYGEN SATURATION: 98 % | DIASTOLIC BLOOD PRESSURE: 70 MMHG | TEMPERATURE: 97.1 F | BODY MASS INDEX: 29.94 KG/M2 | RESPIRATION RATE: 20 BRPM | SYSTOLIC BLOOD PRESSURE: 125 MMHG | HEART RATE: 94 BPM

## 2024-02-15 DIAGNOSIS — E11.65 TYPE 2 DIABETES MELLITUS WITH HYPERGLYCEMIA, WITH LONG-TERM CURRENT USE OF INSULIN: ICD-10-CM

## 2024-02-15 DIAGNOSIS — K59.03 DRUG-INDUCED CONSTIPATION: ICD-10-CM

## 2024-02-15 DIAGNOSIS — K21.9 GASTROESOPHAGEAL REFLUX DISEASE WITHOUT ESOPHAGITIS: ICD-10-CM

## 2024-02-15 DIAGNOSIS — K59.09 CHRONIC CONSTIPATION: Primary | ICD-10-CM

## 2024-02-15 DIAGNOSIS — Z79.4 TYPE 2 DIABETES MELLITUS WITH HYPERGLYCEMIA, WITH LONG-TERM CURRENT USE OF INSULIN: ICD-10-CM

## 2024-02-15 PROCEDURE — 3078F DIAST BP <80 MM HG: CPT | Performed by: FAMILY MEDICINE

## 2024-02-15 PROCEDURE — 3051F HG A1C>EQUAL 7.0%<8.0%: CPT | Performed by: FAMILY MEDICINE

## 2024-02-15 PROCEDURE — 3074F SYST BP LT 130 MM HG: CPT | Performed by: FAMILY MEDICINE

## 2024-02-15 PROCEDURE — 99214 OFFICE O/P EST MOD 30 MIN: CPT | Performed by: FAMILY MEDICINE

## 2024-02-15 RX ORDER — AMOXICILLIN 250 MG
2 CAPSULE ORAL NIGHTLY
Qty: 60 TABLET | Refills: 2 | Status: SHIPPED | OUTPATIENT
Start: 2024-02-15 | End: 2024-02-19

## 2024-02-15 RX ORDER — PANTOPRAZOLE SODIUM 40 MG/1
40 TABLET, DELAYED RELEASE ORAL DAILY
Qty: 30 TABLET | Refills: 2 | Status: SHIPPED | OUTPATIENT
Start: 2024-02-15

## 2024-02-15 RX ORDER — POLYETHYLENE GLYCOL 3350 17 G/17G
34 POWDER, FOR SOLUTION ORAL 2 TIMES DAILY
Qty: 850 G | Refills: 5 | Status: SHIPPED | OUTPATIENT
Start: 2024-02-15

## 2024-02-19 ENCOUNTER — OFFICE VISIT (OUTPATIENT)
Dept: ENDOCRINOLOGY | Facility: CLINIC | Age: 49
End: 2024-02-19
Payer: COMMERCIAL

## 2024-02-19 VITALS
DIASTOLIC BLOOD PRESSURE: 62 MMHG | OXYGEN SATURATION: 98 % | BODY MASS INDEX: 30.21 KG/M2 | SYSTOLIC BLOOD PRESSURE: 114 MMHG | WEIGHT: 160 LBS | HEIGHT: 61 IN | HEART RATE: 90 BPM

## 2024-02-19 DIAGNOSIS — K59.00 CONSTIPATION, UNSPECIFIED CONSTIPATION TYPE: ICD-10-CM

## 2024-02-19 DIAGNOSIS — E11.42 TYPE 2 DIABETES MELLITUS WITH DIABETIC POLYNEUROPATHY, WITH LONG-TERM CURRENT USE OF INSULIN: Primary | ICD-10-CM

## 2024-02-19 DIAGNOSIS — E83.52 HYPERCALCEMIA: ICD-10-CM

## 2024-02-19 DIAGNOSIS — Z79.4 TYPE 2 DIABETES MELLITUS WITH DIABETIC POLYNEUROPATHY, WITH LONG-TERM CURRENT USE OF INSULIN: Primary | ICD-10-CM

## 2024-02-19 PROCEDURE — 1159F MED LIST DOCD IN RCRD: CPT | Performed by: INTERNAL MEDICINE

## 2024-02-19 PROCEDURE — 80053 COMPREHEN METABOLIC PANEL: CPT | Performed by: INTERNAL MEDICINE

## 2024-02-19 PROCEDURE — 3051F HG A1C>EQUAL 7.0%<8.0%: CPT | Performed by: INTERNAL MEDICINE

## 2024-02-19 PROCEDURE — 3078F DIAST BP <80 MM HG: CPT | Performed by: INTERNAL MEDICINE

## 2024-02-19 PROCEDURE — 3074F SYST BP LT 130 MM HG: CPT | Performed by: INTERNAL MEDICINE

## 2024-02-19 PROCEDURE — 82306 VITAMIN D 25 HYDROXY: CPT | Performed by: INTERNAL MEDICINE

## 2024-02-19 PROCEDURE — 99214 OFFICE O/P EST MOD 30 MIN: CPT | Performed by: INTERNAL MEDICINE

## 2024-02-19 PROCEDURE — 95251 CONT GLUC MNTR ANALYSIS I&R: CPT | Performed by: INTERNAL MEDICINE

## 2024-02-19 PROCEDURE — 83970 ASSAY OF PARATHORMONE: CPT | Performed by: INTERNAL MEDICINE

## 2024-02-19 PROCEDURE — 1160F RVW MEDS BY RX/DR IN RCRD: CPT | Performed by: INTERNAL MEDICINE

## 2024-02-19 PROCEDURE — 82330 ASSAY OF CALCIUM: CPT | Performed by: INTERNAL MEDICINE

## 2024-02-19 RX ORDER — ACYCLOVIR 400 MG/1
1 TABLET ORAL
Qty: 9 EACH | Refills: 3 | Status: SHIPPED | OUTPATIENT
Start: 2024-02-19

## 2024-02-19 RX ORDER — ACYCLOVIR 400 MG/1
1 TABLET ORAL DAILY
Qty: 1 EACH | Refills: 0 | Status: SHIPPED | OUTPATIENT
Start: 2024-02-19

## 2024-02-19 RX ORDER — INSULIN DETEMIR 100 [IU]/ML
10 INJECTION, SOLUTION SUBCUTANEOUS DAILY
Qty: 15 ML | Refills: 1 | Status: SHIPPED | OUTPATIENT
Start: 2024-02-19

## 2024-02-19 NOTE — PATIENT INSTRUCTIONS
Please have the eye exam report faxed to [phone].    Increase mounjaro to 5 mg weekly.   Decrease levemir to 10 units. Decrease by 5 units if glucose levels are < 100 or stop the levemir as able.

## 2024-02-19 NOTE — PROGRESS NOTES
"Chief Complaint   Patient presents with    Diabetes     Type II          HPI   Rachana Patrick is a 48 y.o. female had concerns including Diabetes (Type II).    She is checking blood sugars 4+ times per day with CGM. Data reviewed from the last two weeks shows average glucose 135 with variability of 18.1%. In target range 96%, high 4%, very high 0%, low 0%, very low 0%.   Pattern of: postprandial hyperglycemia     Current medications for diabetes include jardiance 25 mg daily, Mounjaro 2.5 mg weekly, Levemir 23 units daily.  Was transition from Trulicity 1.5 mg weekly to Mounjaro after last visit due to supply issues at the pharmacy.  Followed up with PCP last week and discussed management of constipation which seems to be a side effect from the Mounjaro.    Labs about a month ago also revealed a high calcium and alkaline phosphatase level.  Due for additional labs today.    The following portions of the patient's history were reviewed and updated as appropriate: allergies, current medications, past family history, past medical history, past social history, past surgical history, and problem list.      Review of Systems   Constitutional: Negative.    Eyes:  Positive for visual disturbance.   Gastrointestinal:  Positive for constipation.   Endocrine: Negative.         Physical Exam  Vitals reviewed.   Constitutional:       Appearance: Normal appearance. She is obese.      Comments: Body mass index is 30.33 kg/m².   Cardiovascular:      Rate and Rhythm: Normal rate.   Pulmonary:      Effort: Pulmonary effort is normal.   Neurological:      General: No focal deficit present.      Mental Status: She is alert. Mental status is at baseline.   Psychiatric:         Mood and Affect: Mood normal.         Behavior: Behavior normal.        /62 (BP Location: Left arm, Patient Position: Sitting, Cuff Size: Adult)   Pulse 90   Ht 154.7 cm (60.9\")   Wt 72.6 kg (160 lb)   SpO2 98%   BMI 30.33 kg/m²      Labs and " imaging    CMP:  Lab Results   Component Value Date    BUN 15 01/23/2024    CREATININE 0.77 01/23/2024    EGFR 94.1 01/23/2024    BCR 19.5 01/23/2024     01/23/2024    K 5.0 01/23/2024    CO2 28.0 01/23/2024    CALCIUM 10.7 (H) 01/23/2024    ALBUMIN 4.6 01/23/2024    BILITOT 0.3 01/23/2024    ALKPHOS 136 (H) 01/23/2024    AST 22 01/23/2024    ALT 21 01/23/2024     Lipid Panel:  Lab Results   Component Value Date    CHOL 126 01/23/2024    TRIG 151 (H) 01/23/2024    HDL 40 01/23/2024    VLDL 26 01/23/2024    LDL 60 01/23/2024     HbA1c:  Lab Results   Component Value Date    HGBA1C 7.5 (A) 01/23/2024    HGBA1C 7.9 08/16/2023     Glucose:    Lab Results   Component Value Date    POCGLU 266 (A) 01/23/2024     Microalbumin:  Lab Results   Component Value Date    MALBCRERATIO  01/23/2024      Comment:      Unable to calculate     TSH:  Lab Results   Component Value Date    TSH 0.836 01/23/2024       Assessment and plan  Diagnoses and all orders for this visit:    1. Type 2 diabetes mellitus with diabetic polyneuropathy, with long-term current use of insulin (Primary)  Controlled with last A1c 7.5. Complicated by neuropathy.   Continue jardiance 25 mg daily.   Decrease levemir to 10 units. Titrate off when able.   Increase mounjaro to 5 mg weekly. Constipation is managed at this time and is chronic. Is having BM 1-2 times weekly.  No metformin due to history of GI intolerance. Prefers levemir over alternates but also I expect this will be stopped if levels continue to improve.   MF UTD from 1/24. Labs UTD. Ophtho exam should be updated yearly (pending tomorrow). Requested report.   -     POC Glucose, Blood  -     Tirzepatide (MOUNJARO) 5 MG/0.5ML solution pen-injector pen; Inject 0.5 mL under the skin into the appropriate area as directed 1 (One) Time Per Week.  Dispense: 2 mL; Refill: 1  -     insulin detemir (Levemir FlexTouch) 100 UNIT/ML injection; Inject 10 Units under the skin into the appropriate area as  directed Daily.  Dispense: 15 mL; Refill: 1    2. Hypercalcemia  Last calcium and alk phos were high. Check today.   -     Calcium, Ionized  -     Comprehensive Metabolic Panel  -     Vitamin D,25-Hydroxy  -     PTH, Intact    3. Constipation, unspecified constipation type  See above. Is chronic. Assures me mounjaro hasn't caused this to progress. Is controlled with linzess. I have agreed to conservatively increase the mounjaro as long as BM are consistent 1-2 times per week at minimum.        Return in about 4 months (around 6/19/2024) for next scheduled follow up. The patient was instructed to contact the clinic with any interval questions or concerns.    Yuki Hughes, DO   Endocrinologist    Please note that portions of this note were completed with a voice recognition program.

## 2024-02-20 LAB
25(OH)D3 SERPL-MCNC: 21.5 NG/ML (ref 30–100)
ALBUMIN SERPL-MCNC: 4.5 G/DL (ref 3.5–5.2)
ALBUMIN/GLOB SERPL: 1.3 G/DL
ALP SERPL-CCNC: 129 U/L (ref 39–117)
ALT SERPL W P-5'-P-CCNC: 28 U/L (ref 1–33)
ANION GAP SERPL CALCULATED.3IONS-SCNC: 12.5 MMOL/L (ref 5–15)
AST SERPL-CCNC: 29 U/L (ref 1–32)
BILIRUB SERPL-MCNC: 0.3 MG/DL (ref 0–1.2)
BUN SERPL-MCNC: 13 MG/DL (ref 6–20)
BUN/CREAT SERPL: 18.6 (ref 7–25)
CA-I BLD-MCNC: 5.4 MG/DL (ref 4.6–5.4)
CA-I SERPL ISE-MCNC: 1.36 MMOL/L (ref 1.15–1.35)
CALCIUM SPEC-SCNC: 10.1 MG/DL (ref 8.6–10.5)
CHLORIDE SERPL-SCNC: 103 MMOL/L (ref 98–107)
CO2 SERPL-SCNC: 23.5 MMOL/L (ref 22–29)
CREAT SERPL-MCNC: 0.7 MG/DL (ref 0.57–1)
EGFRCR SERPLBLD CKD-EPI 2021: 105.5 ML/MIN/1.73
GLOBULIN UR ELPH-MCNC: 3.5 GM/DL
GLUCOSE SERPL-MCNC: 93 MG/DL (ref 65–99)
POTASSIUM SERPL-SCNC: 4.1 MMOL/L (ref 3.5–5.2)
PROT SERPL-MCNC: 8 G/DL (ref 6–8.5)
PTH-INTACT SERPL-MCNC: 27.4 PG/ML (ref 15–65)
SODIUM SERPL-SCNC: 139 MMOL/L (ref 136–145)

## 2024-02-21 ENCOUNTER — TELEPHONE (OUTPATIENT)
Dept: FAMILY MEDICINE CLINIC | Facility: CLINIC | Age: 49
End: 2024-02-21
Payer: COMMERCIAL

## 2024-02-21 DIAGNOSIS — E11.40 TYPE 2 DIABETES MELLITUS WITH DIABETIC NEUROPATHY, WITH LONG-TERM CURRENT USE OF INSULIN: ICD-10-CM

## 2024-02-21 DIAGNOSIS — Z79.4 TYPE 2 DIABETES MELLITUS WITH DIABETIC NEUROPATHY, WITH LONG-TERM CURRENT USE OF INSULIN: ICD-10-CM

## 2024-02-22 ENCOUNTER — TELEPHONE (OUTPATIENT)
Dept: FAMILY MEDICINE CLINIC | Facility: CLINIC | Age: 49
End: 2024-02-22
Payer: COMMERCIAL

## 2024-02-22 RX ORDER — PREGABALIN 300 MG/1
300 CAPSULE ORAL 2 TIMES DAILY
Qty: 60 CAPSULE | Refills: 5 | Status: SHIPPED | OUTPATIENT
Start: 2024-02-22

## 2024-02-22 RX ORDER — PREGABALIN 300 MG/1
300 CAPSULE ORAL 2 TIMES DAILY
Qty: 60 CAPSULE | OUTPATIENT
Start: 2024-02-22

## 2024-02-22 NOTE — TELEPHONE ENCOUNTER
Patient states she is taking the Lyrica daily, but she had only been taking them once daily instead of twice, so they had lasted her longer. The last week or so her feet have been bothering her worse so she is going to start taking them bid again if she can get a refill.

## 2024-02-22 NOTE — TELEPHONE ENCOUNTER
Submitted prior auth via Cover My Meds for Linzess      Key: [id number]      The request has been approved. The authorization is effective from 02/21/2024 to 02/21/2025, as long as the member is enrolled in their current health plan. A written notification letter will follow with additional details.

## 2024-02-22 NOTE — TELEPHONE ENCOUNTER
It appears patient has not taken this medication in several months. Why is she asking for a refill?

## 2024-02-27 RX ORDER — LOSARTAN POTASSIUM 100 MG/1
100 TABLET ORAL DAILY
Qty: 90 TABLET | Refills: 1 | Status: SHIPPED | OUTPATIENT
Start: 2024-02-27

## 2024-02-27 NOTE — TELEPHONE ENCOUNTER
Caller: Rachana Patrick    Relationship: Self    Best call back number: [phone]     Requested Prescriptions:   Requested Prescriptions     Pending Prescriptions Disp Refills    losartan (COZAAR) 100 MG tablet 90 tablet 1     Sig: Take 1 tablet by mouth Daily. Hold unless SBP > 160        Pharmacy where request [facility] be sent: [id number] - [address] - [phone] [facility] [phone] FX     Last office visit with prescribing clinician: 2/15/2024   Last telemedicine visit with prescribing clinician: Visit date not found   Next office visit with prescribing clinician: 3/14/2024     Additional details provided by patient: NO MEDICATION ON HAND. FIRST MISSED DOSE 02/26/24    Does the patient have less than a 3 day supply:  [x] Yes  [] No    Would you like a call back once the refill request has been completed: [x] Yes [] No    If the office needs to give you a call back, can they leave a voicemail: [] Yes [] No    Pj Burdick Rep   02/27/24 12:26 EST

## 2024-03-11 ENCOUNTER — TELEPHONE (OUTPATIENT)
Dept: ENDOCRINOLOGY | Facility: CLINIC | Age: 49
End: 2024-03-11
Payer: COMMERCIAL

## 2024-03-11 NOTE — TELEPHONE ENCOUNTER
PT CALLED REQUESTING INCREASED DOSE OF MOUNJARO RX TO BE SENT IN TO [facility] IN [place], [place].

## 2024-03-26 ENCOUNTER — TELEPHONE (OUTPATIENT)
Dept: ENDOCRINOLOGY | Facility: CLINIC | Age: 49
End: 2024-03-26
Payer: COMMERCIAL

## 2024-03-26 RX ORDER — DULAGLUTIDE 3 MG/.5ML
3 INJECTION, SOLUTION SUBCUTANEOUS WEEKLY
Qty: 2 ML | Refills: 2 | Status: SHIPPED | OUTPATIENT
Start: 2024-03-26

## 2024-03-26 NOTE — TELEPHONE ENCOUNTER
Spoke with patient.  She does not feel like the Mounjaro is working with controlling her blood sugars.  She states for the last 2 weeks her blood sugars have been pushing 200.   Wants to go back to [facility].  She is going to check to see if she can find in stock and call the office back.

## 2024-03-26 NOTE — TELEPHONE ENCOUNTER
PATIENT STATES THAT SHE FEELS LIKE MOUNJARO ISN'T WORKING AS WELL AS TRULICITY. PATIENT STATES THAT HER GLUCOSE NUMBERS HAVE BEEN RANGING AROUND 200 SINCE THIS MEDICATION CHANGE. PATIENT WAS CHANGED FROM TRULICITY TO MOUNJARO DUE TO DIFFICULTY IN LOCATING TRULICITY IN STOCK. PATIENT STATES THAT SHE IS DRINKING A LOT OF WATER AND FEELS LIKE SHE IS HAVING A LOT OF AIR.     PATIENT WOULD LIKE TO KNOW IF IT IS POSSIBLE TO CHANGE BACK TO TRULICITY.     KYLIE PICKETT    CALL BACK [phone]

## 2024-03-26 NOTE — TELEPHONE ENCOUNTER
PATIENT IS CALLING BACK WANTING A PRESCRIPTION FOR TRULICITY 3 MG CALLED INTO [facility] IN [place]. PATIENTS NUMBER [phone]

## 2024-03-27 ENCOUNTER — PRIOR AUTHORIZATION (OUTPATIENT)
Dept: ENDOCRINOLOGY | Facility: CLINIC | Age: 49
End: 2024-03-27
Payer: COMMERCIAL

## 2024-03-27 NOTE — TELEPHONE ENCOUNTER
Rachana Patrick (Ornelas: [id number])  PA Case ID #: [id number]  Rx #: [id number]  Need Help? Call us at [phone]  Outcome  Approved today  The request has been approved. The authorization is effective for a maximum of 1 fills from 03/27/2024 to 04/26/2024, as long as the member is enrolled in their current health plan. The request was approved as submitted. A written notification letter will follow with additional details.  Authorization Expiration Date: 4/25/2024  Drug  Trulicity 3MG/0.5ML pen-injectors  ePA cloud logo  Form  [employer] [place] Medicaid ePA Form 2017 NCPDP

## 2024-04-01 RX ORDER — AMLODIPINE BESYLATE 10 MG/1
10 TABLET ORAL DAILY
Qty: 90 TABLET | Refills: 1 | Status: SHIPPED | OUTPATIENT
Start: 2024-04-01

## 2024-04-16 ENCOUNTER — TELEPHONE (OUTPATIENT)
Dept: ENDOCRINOLOGY | Facility: CLINIC | Age: 49
End: 2024-04-16
Payer: COMMERCIAL

## 2024-04-16 ENCOUNTER — OFFICE VISIT (OUTPATIENT)
Dept: FAMILY MEDICINE CLINIC | Facility: CLINIC | Age: 49
End: 2024-04-16
Payer: COMMERCIAL

## 2024-04-16 VITALS
TEMPERATURE: 98.6 F | RESPIRATION RATE: 18 BRPM | HEART RATE: 90 BPM | OXYGEN SATURATION: 99 % | DIASTOLIC BLOOD PRESSURE: 58 MMHG | SYSTOLIC BLOOD PRESSURE: 98 MMHG | HEIGHT: 61 IN | BODY MASS INDEX: 29.23 KG/M2 | WEIGHT: 154.8 LBS

## 2024-04-16 DIAGNOSIS — T14.8XXA SPLINTER IN SKIN: Primary | ICD-10-CM

## 2024-04-16 NOTE — TELEPHONE ENCOUNTER
Patient called office, stated that she is currently taking trulicity 3 mg. She wants to know if that dose will ever increase, or if that is the highest dose. She would like a call back.

## 2024-04-16 NOTE — PROGRESS NOTES
Date: 2024   Patient Name: Rachana Patrick  : 1975   MRN: [id number]     Chief Complaint:    Chief Complaint   Patient presents with    object in hand     foreign object in hand maybe splinter from wood-ongoing 2 months       History of Present Illness: Rachana Patrick is a 49 y.o. female who is here today for Possible splinter in 3rd finger of the left hand. She was working out in the yard 3 days ago and got a splinter. She got most of the wood splinter out put she is still having discomfort as if something is still in her finger. No drainage            Review of Systems:   Review of Systems   Skin:  Positive for wound (3rd finger on left hand).       Past Medical History:   Past Medical History:   Diagnosis Date    CVA (cerebral vascular accident) 10/22/2022    Hyperlipidemia     Hypertension     Type 2 diabetes mellitus        Past Surgical History: History reviewed. No pertinent surgical history.    Family History:   Family History   Problem Relation Age of Onset    Diabetes Mother     Hypertension Mother     Heart attack Mother     Diabetes Father     Hypertension Father        Social History:   Social History     Socioeconomic History    Marital status: Single   Tobacco Use    Smoking status: Some Days     Current packs/day: 0.50     Average packs/day: 0.5 packs/day for 15.0 years (7.5 ttl pk-yrs)     Types: Cigarettes     Passive exposure: Current    Smokeless tobacco: Never   Vaping Use    Vaping status: Never Used   Substance and Sexual Activity    Alcohol use: Yes     Comment: social    Drug use: Not Currently    Sexual activity: Defer       Medications:     Current Outpatient Medications:     albuterol sulfate  (90 Base) MCG/ACT inhaler, INHALE 2 PUFFS PO Q 6 H PRN, Disp: , Rfl: 0    amLODIPine (NORVASC) 10 MG tablet, TAKE 1 TABLET BY MOUTH DAILY, Disp: 90 tablet, Rfl: 1    aspirin (Laura Aspirin) 325 MG tablet, Take 1 tablet by mouth Daily., Disp: 100 tablet,  Rfl: 3    atorvastatin (Lipitor) 40 MG tablet, Take 1 tablet by mouth Daily., Disp: 30 tablet, Rfl: 11    Continuous Blood Gluc  (Dexcom G7 ) device, Use 1 each Daily., Disp: 1 each, Rfl: 0    Continuous Blood Gluc Sensor (Dexcom G7 Sensor) misc, Use 1 each Every 10 (Ten) Days., Disp: 9 each, Rfl: 3    Continuous Blood Gluc Transmit (Dexcom G6 Transmitter) misc, 1 each Take As Directed., Disp: 1 each, Rfl: 3    Dulaglutide (Trulicity) 3 MG/0.5ML solution pen-injector, Inject 0.5 mL under the skin into the appropriate area as directed 1 (One) Time Per Week., Disp: 2 mL, Rfl: 2    glucose blood test strip, 1 each by Other route 4 (Four) Times a Day. One Touch Verio Test Strips.  Use To test glucose qid., Disp: 400 each, Rfl: 3    glucose monitor monitoring kit, Use 1 each 4 (Four) Times a Day., Disp: 1 each, Rfl: 0    insulin detemir (Levemir FlexTouch) 100 UNIT/ML injection, Inject 10 Units under the skin into the appropriate area as directed Daily., Disp: 15 mL, Rfl: 1    Insulin Pen Needle 32G X 4 MM misc, Use 1 each 4 (Four) Times a Day., Disp: 400 each, Rfl: 3    Jardiance 25 MG tablet tablet, Take 1 tablet by mouth Daily., Disp: 90 tablet, Rfl: 3    Lancets (OneTouch Delica Plus [id number]) misc, 1 each by Other route 4 (Four) Times a Day., Disp: 400 each, Rfl: 3    losartan (COZAAR) 100 MG tablet, Take 1 tablet by mouth Daily. Hold unless SBP > 160, Disp: 90 tablet, Rfl: 1    polyethylene glycol (MIRALAX) 17 GM/SCOOP powder, Take 34 g by mouth 2 (Two) Times a Day., Disp: 850 g, Rfl: 5    pregabalin (LYRICA) 300 MG capsule, Take 1 capsule by mouth 2 (Two) Times a Day., Disp: 60 capsule, Rfl: 5    mupirocin (BACTROBAN) 2 % ointment, Apply 1 Application topically to the appropriate area as directed 3 (Three) Times a Day., Disp: 30 g, Rfl: 0    Allergies:   Allergies   Allergen Reactions    Metformin GI Intolerance    Ozempic (0.25 Or 0.5 Mg-Dose) [Semaglutide(0.25 Or 0.5mg-Dos)] GI Intolerance  "        Physical Exam:  Vital Signs:   Vitals:    04/16/24 0813   BP: 98/58   Pulse: 90   Resp: 18   Temp: 98.6 °F (37 °C)   SpO2: 99%   Weight: 70.2 kg (154 lb 12.8 oz)   Height: 154.9 cm (61\")     Body mass index is 29.25 kg/m².     Physical Exam  Vitals and nursing note reviewed.   Constitutional:       Appearance: Normal appearance.   HENT:      Head: Normocephalic and atraumatic.   Cardiovascular:      Rate and Rhythm: Normal rate and regular rhythm.   Pulmonary:      Effort: Pulmonary effort is normal.      Breath sounds: Normal breath sounds.   Abdominal:      General: Bowel sounds are normal.   Skin:     General: Skin is warm.      Findings: Wound (3rd finger of the left hand, distal phalanx, a piece of wound present) present.   Neurological:      General: No focal deficit present.      Mental Status: She is alert and oriented to person, place, and time.   Psychiatric:         Mood and Affect: Mood normal.       Foreign Body Removal    Date/Time: 4/16/2024 9:34 AM    Performed by: Ruma Patel APRN  Authorized by: Ruma Patel APRN  Consent: Verbal consent obtained.  Consent given by: patient  Patient understanding: patient states understanding of the procedure being performed  Patient identity confirmed: verbally with patient  Intake: 3rd left finger.  Anesthesia method: none.  Patient cooperative: yes  Complexity: simple  1 objects recovered.  Objects recovered: splinter of wood  Post-procedure assessment: foreign body removed  Patient tolerance: patient tolerated the procedure well with no immediate complications            Assessment/Plan:   Diagnoses and all orders for this visit:    1. Splinter in skin (Primary)  -     mupirocin (BACTROBAN) 2 % ointment; Apply 1 Application topically to the appropriate area as directed 3 (Three) Times a Day.  Dispense: 30 g; Refill: 0    Other orders  -     Foreign Body Removal       Clean BID with antibacterial soap  Apply ointment   Monitor for s/s of " infection    Follow Up:   Return if symptoms worsen or fail to improve.    Ruma Patel. LAZARA   [facility]

## 2024-04-17 ENCOUNTER — TELEPHONE (OUTPATIENT)
Dept: ENDOCRINOLOGY | Facility: CLINIC | Age: 49
End: 2024-04-17

## 2024-04-18 ENCOUNTER — TELEPHONE (OUTPATIENT)
Dept: ENDOCRINOLOGY | Facility: CLINIC | Age: 49
End: 2024-04-18
Payer: COMMERCIAL

## 2024-04-18 DIAGNOSIS — E11.42 TYPE 2 DIABETES MELLITUS WITH DIABETIC POLYNEUROPATHY, WITH LONG-TERM CURRENT USE OF INSULIN: ICD-10-CM

## 2024-04-18 DIAGNOSIS — Z79.4 TYPE 2 DIABETES MELLITUS WITH DIABETIC POLYNEUROPATHY, WITH LONG-TERM CURRENT USE OF INSULIN: Primary | ICD-10-CM

## 2024-04-18 DIAGNOSIS — E11.42 TYPE 2 DIABETES MELLITUS WITH DIABETIC POLYNEUROPATHY, WITH LONG-TERM CURRENT USE OF INSULIN: Primary | ICD-10-CM

## 2024-04-18 DIAGNOSIS — Z79.4 TYPE 2 DIABETES MELLITUS WITH DIABETIC POLYNEUROPATHY, WITH LONG-TERM CURRENT USE OF INSULIN: ICD-10-CM

## 2024-04-18 RX ORDER — DULAGLUTIDE 4.5 MG/.5ML
4.5 INJECTION, SOLUTION SUBCUTANEOUS WEEKLY
Qty: 2 ML | Refills: 3 | Status: SHIPPED | OUTPATIENT
Start: 2024-04-18 | End: 2024-04-19 | Stop reason: SDUPTHER

## 2024-04-18 NOTE — TELEPHONE ENCOUNTER
PT CALLED REQUESTING [facility] RX TO BE SENT IN TO [facility].

## 2024-04-18 NOTE — TELEPHONE ENCOUNTER
Duplicate message.  See message from 04/16/2024.  Message has been sent to provider for dose increase.

## 2024-04-18 NOTE — TELEPHONE ENCOUNTER
Patient called office, stated that she was retunring Marias call. She said to call back at [phone].

## 2024-04-19 RX ORDER — DULAGLUTIDE 4.5 MG/.5ML
4.5 INJECTION, SOLUTION SUBCUTANEOUS WEEKLY
Qty: 2 ML | Refills: 3 | Status: SHIPPED | OUTPATIENT
Start: 2024-04-19

## 2024-05-13 ENCOUNTER — TELEPHONE (OUTPATIENT)
Dept: ENDOCRINOLOGY | Facility: CLINIC | Age: 49
End: 2024-05-13
Payer: COMMERCIAL

## 2024-05-13 DIAGNOSIS — Z86.73 HISTORY OF STROKE: ICD-10-CM

## 2024-05-13 DIAGNOSIS — E78.5 HYPERLIPIDEMIA LDL GOAL <70: ICD-10-CM

## 2024-05-13 RX ORDER — ATORVASTATIN CALCIUM 40 MG/1
40 TABLET, FILM COATED ORAL DAILY
Qty: 30 TABLET | Refills: 11 | Status: SHIPPED | OUTPATIENT
Start: 2024-05-13 | End: 2025-05-13

## 2024-05-13 NOTE — TELEPHONE ENCOUNTER
Caller: Rachana Patrick    Relationship: Self    Best call back number:      Requested Prescriptions:   Requested Prescriptions     Pending Prescriptions Disp Refills    atorvastatin (Lipitor) 40 MG tablet 30 tablet 11     Sig: Take 1 tablet by mouth Daily.           Pharmacy where request should be sent: [facility] #[id number] - [address] AT [facility] - [phone] [facility] [phone]      Last office visit with prescribing clinician: 2/15/2024   Last telemedicine visit with prescribing clinician: Visit date not found   Next office visit with prescribing clinician: Visit date not found     Additional details provided by patient: PATIENT HAS A COUPLE PILLS LEFT ON THE LIPITOR;       Does the patient have less than a 3 day supply:  [x] Yes  [] No    Pj Tai Rep   05/13/24 11:20 EDT

## 2024-05-13 NOTE — TELEPHONE ENCOUNTER
Caller: Rachana Patrick    Relationship: Self    Best call back number: [phone]    Requested Prescriptions:  Jardiance 25 MG tablet tablet [[id number]]     Dulaglutide (Trulicity) 4.5 MG/0.5ML solution pen-injector  TEST STRIPS FOR FINGER PRICK      Requested Prescriptions      No prescriptions requested or ordered in this encounter        Pharmacy where request should be sent:    [facility] #[id number] [place], [place] - [address] - [phone]  - [phone]  [phone] [address] [facility] [phone]         Last office visit with prescribing clinician: 2/19/2024   Last telemedicine visit with prescribing clinician: Visit date not found   Next office visit with prescribing clinician: 6/26/2024     Additional details provided by patient: PATIENT STATES NOBODY HAS HAS TRULICITY 4.5 AND DOESN'T  WANT TO START OVER WITH A NEW MEDICATION ASKED WHAT SHE CAN DO. PT KNOCKED OFF HER . ASKED FOR TEST STRIPS TO PRICK HER FINGER SINCE SHE KNOCKED OFF HER DEXCOM G7 SENSOR AND HER INSURANCE WON'T COVER   Does the patient have less than a 3 day supply:  [x] Yes  [] No    Would you like a call back once the refill request has been completed: [x] Yes [] No    If the office needs to give you a call back, can they leave a voicemail: [x] Yes [] No    Pj Domínguez Rep   05/13/24 11:32 EDT

## 2024-05-13 NOTE — TELEPHONE ENCOUNTER
Spoke with patient.  Unable to locate the Trulicity 4.5mg dose in stock at any of the pharmacies.  She is going to check with them to see if they have any of the doses of trulicity in stock and call the office back.

## 2024-05-15 DIAGNOSIS — Z79.4 TYPE 2 DIABETES MELLITUS WITH DIABETIC POLYNEUROPATHY, WITH LONG-TERM CURRENT USE OF INSULIN: Primary | ICD-10-CM

## 2024-05-15 DIAGNOSIS — E11.42 TYPE 2 DIABETES MELLITUS WITH DIABETIC POLYNEUROPATHY, WITH LONG-TERM CURRENT USE OF INSULIN: Primary | ICD-10-CM

## 2024-05-15 NOTE — TELEPHONE ENCOUNTER
PATIENT IS CALLING BACK STATING SHE HAS CALLED ALL PHARMACY AND NONE OF THE PHARMACIES HAS TRULICITY IN ANY DOSAGES. SHE NEEDS TO BE ADVISED ON WHAT OTHER OPTIONS SHE HAS TO HELP BLOOD SUGARS. SHE HAS USED MOUNJARO IN THE PAST. PATIENTS NUMBER [phone]

## 2024-05-24 ENCOUNTER — OFFICE VISIT (OUTPATIENT)
Dept: FAMILY MEDICINE CLINIC | Facility: CLINIC | Age: 49
End: 2024-05-24
Payer: COMMERCIAL

## 2024-05-24 VITALS
OXYGEN SATURATION: 96 % | TEMPERATURE: 98.6 F | RESPIRATION RATE: 14 BRPM | SYSTOLIC BLOOD PRESSURE: 106 MMHG | DIASTOLIC BLOOD PRESSURE: 72 MMHG | BODY MASS INDEX: 29.07 KG/M2 | WEIGHT: 154 LBS | HEIGHT: 61 IN | HEART RATE: 94 BPM

## 2024-05-24 DIAGNOSIS — F41.9 ANXIETY AND DEPRESSION: Primary | ICD-10-CM

## 2024-05-24 DIAGNOSIS — R10.9 FLANK PAIN: ICD-10-CM

## 2024-05-24 DIAGNOSIS — F32.A ANXIETY AND DEPRESSION: Primary | ICD-10-CM

## 2024-05-24 LAB
BILIRUB BLD-MCNC: NEGATIVE MG/DL
CLARITY, POC: CLEAR
COLOR UR: YELLOW
EXPIRATION DATE: ABNORMAL
GLUCOSE UR STRIP-MCNC: ABNORMAL MG/DL
KETONES UR QL: NEGATIVE
LEUKOCYTE EST, POC: NEGATIVE
Lab: ABNORMAL
NITRITE UR-MCNC: NEGATIVE MG/ML
PH UR: 6 [PH] (ref 5–8)
PROT UR STRIP-MCNC: NEGATIVE MG/DL
RBC # UR STRIP: NEGATIVE /UL
SP GR UR: 1.01 (ref 1–1.03)
UROBILINOGEN UR QL: NORMAL

## 2024-05-24 NOTE — PROGRESS NOTES
Date: 2024   Patient Name: Rachana Patrick  : 1975   MRN: [id number]     Chief Complaint:    Chief Complaint   Patient presents with    Abdominal Pain     L side pain x yest. Noticed urine was darker.        History of Present Illness: Rachana Patrick is a 49 y.o. female who is here today for Left flank pain, strong urine smell, dark odor that she noticed yesterday. She is not having any vaginal discharge, N/V/D, suprapubic pain. Not taking any OTC medication to aid in symptoms. She is a DM and average BG has been 120, occasional low BG of 68. She reports increase in anxiety, specifically since CVA 1 year ago. She gets anxious when driving. Was supposed to f/u on left foot fracture with ortho in [place], but stated she had a panic attack and was able to go to her appt.          Abdominal Pain             Review of Systems:   Review of Systems   Reason unable to perform ROS: see HPI.   Gastrointestinal:  Positive for abdominal pain.   Genitourinary:  Positive for flank pain.       Past Medical History:   Past Medical History:   Diagnosis Date    CVA (cerebral vascular accident) 10/22/2022    Hyperlipidemia     Hypertension     Type 2 diabetes mellitus        Past Surgical History: History reviewed. No pertinent surgical history.    Family History:   Family History   Problem Relation Age of Onset    Diabetes Mother     Hypertension Mother     Heart attack Mother     Diabetes Father     Hypertension Father        Social History:   Social History     Socioeconomic History    Marital status: Single   Tobacco Use    Smoking status: Some Days     Current packs/day: 0.50     Average packs/day: 0.5 packs/day for 15.0 years (7.5 ttl pk-yrs)     Types: Cigarettes     Passive exposure: Current    Smokeless tobacco: Never   Vaping Use    Vaping status: Never Used   Substance and Sexual Activity    Alcohol use: Yes     Comment: social    Drug use: Not Currently    Sexual activity: Defer        Medications:     Current Outpatient Medications:     albuterol sulfate  (90 Base) MCG/ACT inhaler, INHALE 2 PUFFS PO Q 6 H PRN, Disp: , Rfl: 0    amLODIPine (NORVASC) 10 MG tablet, TAKE 1 TABLET BY MOUTH DAILY, Disp: 90 tablet, Rfl: 1    aspirin (Laura Aspirin) 325 MG tablet, Take 1 tablet by mouth Daily., Disp: 100 tablet, Rfl: 3    atorvastatin (Lipitor) 40 MG tablet, Take 1 tablet by mouth Daily., Disp: 30 tablet, Rfl: 11    Continuous Blood Gluc  (Dexcom G7 ) device, Use 1 each Daily., Disp: 1 each, Rfl: 0    Continuous Blood Gluc Sensor (Dexcom G7 Sensor) misc, Use 1 each Every 10 (Ten) Days., Disp: 9 each, Rfl: 3    Continuous Blood Gluc Transmit (Dexcom G6 Transmitter) misc, 1 each Take As Directed., Disp: 1 each, Rfl: 3    glucose blood test strip, 1 each by Other route 4 (Four) Times a Day. One Touch Verio Test Strips.  Use To test glucose qid., Disp: 400 each, Rfl: 3    glucose monitor monitoring kit, Use 1 each 4 (Four) Times a Day., Disp: 1 each, Rfl: 0    insulin detemir (Levemir FlexTouch) 100 UNIT/ML injection, Inject 10 Units under the skin into the appropriate area as directed Daily., Disp: 15 mL, Rfl: 1    Insulin Pen Needle 32G X 4 MM misc, Use 1 each 4 (Four) Times a Day., Disp: 400 each, Rfl: 3    Jardiance 25 MG tablet tablet, Take 1 tablet by mouth Daily., Disp: 90 tablet, Rfl: 3    Lancets (OneTouch Delica Plus [id number]) misc, 1 each by Other route 4 (Four) Times a Day., Disp: 400 each, Rfl: 3    losartan (COZAAR) 100 MG tablet, Take 1 tablet by mouth Daily. Hold unless SBP > 160, Disp: 90 tablet, Rfl: 1    pregabalin (LYRICA) 300 MG capsule, Take 1 capsule by mouth 2 (Two) Times a Day., Disp: 60 capsule, Rfl: 5    Tirzepatide (MOUNJARO) 7.5 MG/0.5ML solution pen-injector pen, Inject 0.5 mL under the skin into the appropriate area as directed 1 (One) Time Per Week., Disp: 2 mL, Rfl: 1    Allergies:   Allergies   Allergen Reactions    Metformin GI Intolerance     "Ozempic (0.25 Or 0.5 Mg-Dose) [Semaglutide(0.25 Or 0.5mg-Dos)] GI Intolerance         Physical Exam:  Vital Signs:   Vitals:    05/24/24 1209   BP: 106/72   Pulse: 94   Resp: 14   Temp: 98.6 °F (37 °C)   SpO2: 96%   Weight: 69.9 kg (154 lb)   Height: 154.9 cm (61\")     Body mass index is 29.1 kg/m².     Physical Exam  Vitals and nursing note reviewed.   Constitutional:       Appearance: Normal appearance.   HENT:      Head: Normocephalic and atraumatic.   Cardiovascular:      Rate and Rhythm: Normal rate and regular rhythm.   Pulmonary:      Effort: Pulmonary effort is normal.      Breath sounds: Normal breath sounds.   Abdominal:      General: Bowel sounds are normal.   Musculoskeletal:      Cervical back: Normal.      Thoracic back: Normal.      Lumbar back: No tenderness. Normal range of motion.   Skin:     General: Skin is warm.   Neurological:      General: No focal deficit present.      Mental Status: She is alert and oriented to person, place, and time.   Psychiatric:         Mood and Affect: Mood normal.         Behavior: Behavior normal.           Assessment/Plan:   Diagnoses and all orders for this visit:    1. Anxiety and depression (Primary)  -     Ambulatory Referral to Behavioral Health    2. Flank pain  -     POCT urinalysis dipstick, automated    Anxiety and Depression  No medication prescribed at this time  Therapy order in place  Notify if symptoms are worsening   Develop coping mechanisms  Best outcome of improving anx/dep is in conjunction with therapy/counseling; this was discussed in clinic today and suggested to the patient.  Flank Pain education  negative UA in clinic  Educated on s/s of kidney stone and UTI symptoms.  Monitor for worsening symptoms  Increase water intake  Wear cotton underwear  Go to the ER with temp of 103 or greater, severe abdominal pain, back pain, nausea and diarrhea.        Follow Up:   Return if symptoms worsen or fail to improve, for Next scheduled follow " up.    Ruma Patel. LAZARA   [facility]

## 2024-05-28 ENCOUNTER — TELEPHONE (OUTPATIENT)
Dept: FAMILY MEDICINE CLINIC | Facility: CLINIC | Age: 49
End: 2024-05-28
Payer: COMMERCIAL

## 2024-05-28 DIAGNOSIS — S82.62XA CLOSED AVULSION FRACTURE OF LATERAL MALLEOLUS OF LEFT FIBULA, INITIAL ENCOUNTER: Primary | ICD-10-CM
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
## 2024-05-31 NOTE — TELEPHONE ENCOUNTER
Pt aware and understood. Yes, she saw Dr Ramos. Requesting referral to Neurology bc 'something is off.' Aware to go to ER if symptoms worsen etc. Transferred to front to schedule appt.

## 2024-05-31 NOTE — TELEPHONE ENCOUNTER
Referral has been sent to ortho, Referral Coordinator is working on this.   We did not discuss anything about a neuro ophthalmologist. In 2022 she was referred to Dr Ramos is she seeing him?

## 2024-06-13 ENCOUNTER — OFFICE VISIT (OUTPATIENT)
Dept: FAMILY MEDICINE CLINIC | Facility: CLINIC | Age: 49
End: 2024-06-13
Payer: COMMERCIAL

## 2024-06-13 VITALS
OXYGEN SATURATION: 100 % | TEMPERATURE: 97.1 F | RESPIRATION RATE: 20 BRPM | WEIGHT: 152.6 LBS | HEART RATE: 98 BPM | DIASTOLIC BLOOD PRESSURE: 70 MMHG | BODY MASS INDEX: 28.81 KG/M2 | SYSTOLIC BLOOD PRESSURE: 110 MMHG | HEIGHT: 61 IN

## 2024-06-13 DIAGNOSIS — I69.30 H/O: STROKE WITH RESIDUAL EFFECTS: ICD-10-CM

## 2024-06-13 DIAGNOSIS — R21 RASH AND NONSPECIFIC SKIN ERUPTION: Primary | ICD-10-CM

## 2024-06-13 PROCEDURE — 3051F HG A1C>EQUAL 7.0%<8.0%: CPT | Performed by: FAMILY MEDICINE

## 2024-06-13 PROCEDURE — 1126F AMNT PAIN NOTED NONE PRSNT: CPT | Performed by: FAMILY MEDICINE

## 2024-06-13 PROCEDURE — 3074F SYST BP LT 130 MM HG: CPT | Performed by: FAMILY MEDICINE

## 2024-06-13 PROCEDURE — 99213 OFFICE O/P EST LOW 20 MIN: CPT | Performed by: FAMILY MEDICINE

## 2024-06-13 PROCEDURE — 3078F DIAST BP <80 MM HG: CPT | Performed by: FAMILY MEDICINE

## 2024-06-13 RX ORDER — TRIAMCINOLONE ACETONIDE 1 MG/G
1 CREAM TOPICAL 2 TIMES DAILY
Qty: 453 G | Refills: 0 | Status: SHIPPED | OUTPATIENT
Start: 2024-06-13

## 2024-06-13 NOTE — PROGRESS NOTES
"Chief Complaint   Patient presents with    memory issues, balance      Pt say's, she had a stroke approx 2 yrs ago and has been having weakness, balance issues and memory issues.     Fatigue     Describes as \"weakness\"     Headache     Daily for the past few weeks        Subjective       Rachana Patrick is a 49 y.o. who presents for pruritic rash that has been present for a few days.     She also continues to struggle with neurologic sequelae of her stroke from 2 years ago. Specifically her memory which has been impared since her stroke and has associated poor retention.     Objective   Vital Signs:  /70   Pulse 98   Temp 97.1 °F (36.2 °C)   Resp 20   Ht 154.9 cm (61\")   Wt 69.2 kg (152 lb 9.6 oz)   SpO2 100%   BMI 28.83 kg/m²     Physical Exam  Vitals reviewed.   Constitutional:       Appearance: Normal appearance.   Skin:     Comments: Light erythematous papular rash on extremties   Neurological:      Mental Status: She is alert.          Result Review                     Assessment and Plan  Diagnoses and all orders for this visit:    1. Rash and nonspecific skin eruption (Primary)  Comments:  use kenalog cream. suspect grass allergy  Orders:  -     triamcinolone (KENALOG) 0.1 % cream; Apply 1 Application topically to the appropriate area as directed 2 (Two) Times a Day.  Dispense: 453 g; Refill: 0    2. H/O: stroke with residual effects  Comments:  Pt. informed her deficits will not improve            Follow Up  No follow-ups on file.  Patient was given instructions and counseling regarding her condition or for health maintenance advice. Please see specific information pulled into the AVS if appropriate.       "

## 2024-07-01 ENCOUNTER — OFFICE VISIT (OUTPATIENT)
Dept: ENDOCRINOLOGY | Facility: CLINIC | Age: 49
End: 2024-07-01
Payer: COMMERCIAL

## 2024-07-01 VITALS
HEIGHT: 61 IN | DIASTOLIC BLOOD PRESSURE: 72 MMHG | SYSTOLIC BLOOD PRESSURE: 122 MMHG | OXYGEN SATURATION: 100 % | BODY MASS INDEX: 28.13 KG/M2 | HEART RATE: 95 BPM | WEIGHT: 149 LBS

## 2024-07-01 DIAGNOSIS — Z79.4 TYPE 2 DIABETES MELLITUS WITH DIABETIC POLYNEUROPATHY, WITH LONG-TERM CURRENT USE OF INSULIN: Primary | ICD-10-CM

## 2024-07-01 DIAGNOSIS — E11.42 TYPE 2 DIABETES MELLITUS WITH DIABETIC POLYNEUROPATHY, WITH LONG-TERM CURRENT USE OF INSULIN: Primary | ICD-10-CM

## 2024-07-01 LAB
EXPIRATION DATE: ABNORMAL
EXPIRATION DATE: ABNORMAL
GLUCOSE BLDC GLUCOMTR-MCNC: 168 MG/DL (ref 70–130)
HBA1C MFR BLD: 6.6 % (ref 4.5–5.7)
Lab: ABNORMAL
Lab: ABNORMAL

## 2024-07-01 PROCEDURE — 82947 ASSAY GLUCOSE BLOOD QUANT: CPT | Performed by: INTERNAL MEDICINE

## 2024-07-01 PROCEDURE — 99214 OFFICE O/P EST MOD 30 MIN: CPT | Performed by: INTERNAL MEDICINE

## 2024-07-01 RX ORDER — EMPAGLIFLOZIN 25 MG/1
25 TABLET, FILM COATED ORAL DAILY
Qty: 90 TABLET | Refills: 3 | Status: SHIPPED | OUTPATIENT
Start: 2024-07-01

## 2024-07-01 NOTE — PROGRESS NOTES
"Chief Complaint   Patient presents with    Diabetes     Type 2 diabetes mellitus with diabetic polyneuropathy, with long-term current use of insulin            HPI   Rachana Patrick is a 49 y.o. female had concerns including Diabetes (Type 2 diabetes mellitus with diabetic polyneuropathy, with long-term current use of insulin/).    She is checking blood sugars 2-3 times per day.  Current medications for diabetes include Mounjaro 7.5 mg weekly, Jardiance 25 mg daily.  CGM won't stay on due to sweating.     A1c is improved to 6.6.  Weight is down 11 pounds since her last visit here.    The following portions of the patient's history were reviewed and updated as appropriate: allergies, current medications, past family history, past medical history, past social history, past surgical history, and problem list.      Review of Systems   Constitutional: Negative.    HENT:  Positive for congestion.    Eyes:  Positive for discharge.   Endocrine: Negative.         Physical Exam  Vitals reviewed.   Constitutional:       Appearance: Normal appearance.   Cardiovascular:      Rate and Rhythm: Normal rate.   Pulmonary:      Effort: Pulmonary effort is normal.   Neurological:      General: No focal deficit present.      Mental Status: She is alert. Mental status is at baseline.   Psychiatric:         Mood and Affect: Mood normal.         Behavior: Behavior normal.        /72 (BP Location: Left arm, Patient Position: Sitting)   Pulse 95   Ht 154.9 cm (61\")   Wt 67.6 kg (149 lb)   SpO2 100%   BMI 28.15 kg/m²      Labs and imaging    CMP:  Lab Results   Component Value Date    BUN 13 02/19/2024    CREATININE 0.70 02/19/2024    EGFR 105.5 02/19/2024    BCR 18.6 02/19/2024     02/19/2024    K 4.1 02/19/2024    CO2 23.5 02/19/2024    CALCIUM 10.1 02/19/2024    ALBUMIN 4.5 02/19/2024    BILITOT 0.3 02/19/2024    ALKPHOS 129 (H) 02/19/2024    AST 29 02/19/2024    ALT 28 02/19/2024     Lipid Panel:  Lab Results "   Component Value Date    CHOL 126 01/23/2024    TRIG 151 (H) 01/23/2024    HDL 40 01/23/2024    VLDL 26 01/23/2024    LDL 60 01/23/2024     HbA1c:  Lab Results   Component Value Date    HGBA1C 6.6 (A) 07/01/2024    HGBA1C 7.5 (A) 01/23/2024     Glucose:    Lab Results   Component Value Date    POCGLU 168 (A) 07/01/2024     Microalbumin:  Lab Results   Component Value Date    MALBCRERATIO  01/23/2024      Comment:      Unable to calculate     TSH:  Lab Results   Component Value Date    TSH 0.836 01/23/2024       Assessment and plan  Diagnoses and all orders for this visit:    1. Type 2 diabetes mellitus with diabetic polyneuropathy, with long-term current use of insulin (Primary)  Controlled with A1c 6.6. History of neuropathy.   Intolerant to metformin.   Stay on jardiance 25 mg daily.   Increase mounjaro to 10 mg weekly as able due to supply. She liked trulicity better, but issues with supply.   Stop levemir.   Labs are UTD from 1/2024. MF UTD from 1/2024. Ophtho exam should be updated yearly and requested the report be sent.   -     POC Glycosylated Hemoglobin (Hb A1C)  -     POC Glucose, Blood  -     Tirzepatide (MOUNJARO) 10 MG/0.5ML solution pen-injector pen; Inject 0.5 mL under the skin into the appropriate area as directed 1 (One) Time Per Week.  Dispense: 2 mL; Refill: 1  -     Tirzepatide (MOUNJARO) 7.5 MG/0.5ML solution pen-injector pen; Inject 0.5 mL under the skin into the appropriate area as directed 1 (One) Time Per Week.  Dispense: 2 mL; Refill: 1         Return in about 4 months (around 11/1/2024) for next scheduled follow up. The patient was instructed to contact the clinic with any interval questions or concerns.    Electronically signed by: Yuki Hughes DO   Endocrinologist    Please note that portions of this note were completed with a voice recognition program.

## 2024-07-01 NOTE — PATIENT INSTRUCTIONS
Patients with diabetes should have a yearly eye exam. Please be sure to schedule this at least once yearly and have the eye exam report faxed to [phone].

## 2024-07-02 ENCOUNTER — PRIOR AUTHORIZATION (OUTPATIENT)
Dept: ENDOCRINOLOGY | Facility: CLINIC | Age: 49
End: 2024-07-02
Payer: COMMERCIAL

## 2024-07-03 NOTE — TELEPHONE ENCOUNTER
Rachana Patrick (Ornelas: [id number])  PA Case ID #: [id number]  Rx #: [id number]  Need Help? Call us at [phone]  Outcome  Approved on July 2  The request has been approved. The authorization is effective from 07/02/2024 to 08/01/2024, as long as the member is enrolled in their current health plan. The request was reviewed and approved by a licensed clinical pharmacist. A written notification letter will follow with additional details.  Authorization Expiration Date: 7/31/2024  Drug  Mounjaro 10MG/0.5ML pen-injectors  ePA cloud logo  Form  MedImpact [place] Medicaid ePA Form 2017 NCPDP

## 2024-08-09 DIAGNOSIS — Z79.4 TYPE 2 DIABETES MELLITUS WITH DIABETIC POLYNEUROPATHY, WITH LONG-TERM CURRENT USE OF INSULIN: ICD-10-CM

## 2024-08-09 DIAGNOSIS — E11.42 TYPE 2 DIABETES MELLITUS WITH DIABETIC POLYNEUROPATHY, WITH LONG-TERM CURRENT USE OF INSULIN: ICD-10-CM

## 2024-08-09 NOTE — TELEPHONE ENCOUNTER
PATIENT IS CALLING STATING SHE TOOK HER LAST DOSE OF MOUNJARO 10 MG, SHE NEEDS TO KNOW IF WE ARE INCREASING THE DOSE OR DOES SHE REMAIN ON 10 MG. SHE NEEDS A PRESCRIPTION FOR MOUNJARO CALLED INTO [facility]. PHONE NUMBER [phone]

## 2024-08-13 ENCOUNTER — TELEPHONE (OUTPATIENT)
Dept: ENDOCRINOLOGY | Facility: CLINIC | Age: 49
End: 2024-08-13
Payer: COMMERCIAL

## 2024-08-13 NOTE — TELEPHONE ENCOUNTER
Spoke with patient.  Patient is OK with increasing dose of Mounjaro to 12.5 if OK with Dr Hughes.  New Script goes to [facility].  [phone].

## 2024-08-29 DIAGNOSIS — Z79.4 TYPE 2 DIABETES MELLITUS WITH DIABETIC NEUROPATHY, WITH LONG-TERM CURRENT USE OF INSULIN: ICD-10-CM

## 2024-08-29 DIAGNOSIS — Z86.73 HISTORY OF STROKE: ICD-10-CM

## 2024-08-29 DIAGNOSIS — E11.40 TYPE 2 DIABETES MELLITUS WITH DIABETIC NEUROPATHY, WITH LONG-TERM CURRENT USE OF INSULIN: ICD-10-CM

## 2024-08-29 DIAGNOSIS — E78.5 HYPERLIPIDEMIA LDL GOAL <70: ICD-10-CM

## 2024-08-29 DIAGNOSIS — Z79.4 TYPE 2 DIABETES MELLITUS WITH DIABETIC POLYNEUROPATHY, WITH LONG-TERM CURRENT USE OF INSULIN: ICD-10-CM

## 2024-08-29 DIAGNOSIS — E11.42 TYPE 2 DIABETES MELLITUS WITH DIABETIC POLYNEUROPATHY, WITH LONG-TERM CURRENT USE OF INSULIN: ICD-10-CM

## 2024-08-29 RX ORDER — LOSARTAN POTASSIUM 100 MG/1
100 TABLET ORAL DAILY
Qty: 90 TABLET | Refills: 1 | Status: SHIPPED | OUTPATIENT
Start: 2024-08-29

## 2024-08-29 RX ORDER — PREGABALIN 300 MG/1
300 CAPSULE ORAL 2 TIMES DAILY
Qty: 60 CAPSULE | Refills: 5 | Status: SHIPPED | OUTPATIENT
Start: 2024-08-29

## 2024-08-29 RX ORDER — ATORVASTATIN CALCIUM 40 MG/1
40 TABLET, FILM COATED ORAL DAILY
Qty: 30 TABLET | Refills: 11 | Status: SHIPPED | OUTPATIENT
Start: 2024-08-29 | End: 2025-08-29

## 2024-08-29 RX ORDER — AMLODIPINE BESYLATE 10 MG/1
10 TABLET ORAL DAILY
Qty: 90 TABLET | Refills: 1 | Status: SHIPPED | OUTPATIENT
Start: 2024-08-29
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
## 2024-09-12 ENCOUNTER — PRIOR AUTHORIZATION (OUTPATIENT)
Dept: ENDOCRINOLOGY | Facility: CLINIC | Age: 49
End: 2024-09-12
Payer: COMMERCIAL

## 2024-09-12 NOTE — TELEPHONE ENCOUNTER
Rachana Patrick (Key: [id number])  PA Case ID #: [id number]  Rx #: [id number]  Need Help? Call us at [phone]  Outcome  Approved today by [place] Medicaid MedImpact 2017  The request has been approved. The authorization is effective for a maximum of 12 fills from 09/12/2024 to 09/12/2025, as long as the member is enrolled in their current health plan. A written notification letter will follow with additional details.  Authorization Expiration Date: 9/11/2025  Drug  Dexcom G7 Sensor  ePA cloud logo  Form  Med[employer]act [place] Medicaid ePA Form 2017 NCPDP

## 2024-09-18 ENCOUNTER — TELEPHONE (OUTPATIENT)
Dept: FAMILY MEDICINE CLINIC | Facility: CLINIC | Age: 49
End: 2024-09-18
Payer: COMMERCIAL

## 2024-09-18 DIAGNOSIS — Z86.73 HISTORY OF STROKE: Primary | ICD-10-CM

## 2024-09-19 RX ORDER — ASPIRIN 325 MG
325 TABLET, DELAYED RELEASE (ENTERIC COATED) ORAL EVERY 6 HOURS PRN
Qty: 30 TABLET | Refills: 11 | Status: SHIPPED | OUTPATIENT
Start: 2024-09-19

## 2024-10-02 ENCOUNTER — TELEPHONE (OUTPATIENT)
Dept: FAMILY MEDICINE CLINIC | Facility: CLINIC | Age: 49
End: 2024-10-02
Payer: COMMERCIAL

## 2024-10-02 NOTE — TELEPHONE ENCOUNTER
Caller: Rachana Patrick    Relationship: Self    Best call back number: [phone]    What medication are you requesting:     SOMETHING FOR SYMPTOMS    What are your current symptoms:     FACE HURTS, FATIGUE, THINKS SHE HAS A SINUS INFECTION    If a prescription is needed, what is your preferred pharmacy and phone number:      [id number] - [address] AT [facility] - [phone] [facility] [phone]       Additional notes:    PATIENT WOULD LIKE SOMETHING CALLED IN. HOWEVER, IF DOCTOR WILL NOT CALL SOMETHING IN, HUB MADE HER AN APPOINTMENT FOR 10/4

## 2024-10-04 ENCOUNTER — OFFICE VISIT (OUTPATIENT)
Dept: FAMILY MEDICINE CLINIC | Facility: CLINIC | Age: 49
End: 2024-10-04
Payer: COMMERCIAL

## 2024-10-04 VITALS
SYSTOLIC BLOOD PRESSURE: 140 MMHG | HEIGHT: 61 IN | OXYGEN SATURATION: 99 % | TEMPERATURE: 97.8 F | HEART RATE: 95 BPM | DIASTOLIC BLOOD PRESSURE: 68 MMHG | BODY MASS INDEX: 26.13 KG/M2 | RESPIRATION RATE: 20 BRPM | WEIGHT: 138.4 LBS

## 2024-10-04 DIAGNOSIS — J01.00 ACUTE NON-RECURRENT MAXILLARY SINUSITIS: Primary | ICD-10-CM

## 2024-10-04 DIAGNOSIS — J02.9 SORE THROAT: ICD-10-CM

## 2024-10-04 PROCEDURE — 87428 SARSCOV & INF VIR A&B AG IA: CPT | Performed by: FAMILY MEDICINE

## 2024-10-04 PROCEDURE — 3077F SYST BP >= 140 MM HG: CPT | Performed by: FAMILY MEDICINE

## 2024-10-04 PROCEDURE — 3078F DIAST BP <80 MM HG: CPT | Performed by: FAMILY MEDICINE

## 2024-10-04 PROCEDURE — 99213 OFFICE O/P EST LOW 20 MIN: CPT | Performed by: FAMILY MEDICINE

## 2024-10-04 PROCEDURE — 3044F HG A1C LEVEL LT 7.0%: CPT | Performed by: FAMILY MEDICINE

## 2024-10-04 PROCEDURE — 1126F AMNT PAIN NOTED NONE PRSNT: CPT | Performed by: FAMILY MEDICINE

## 2024-10-04 RX ORDER — AMOXICILLIN 500 MG/1
1000 CAPSULE ORAL 2 TIMES DAILY
Qty: 28 CAPSULE | Refills: 0 | Status: SHIPPED | OUTPATIENT
Start: 2024-10-04

## 2024-10-04 NOTE — PROGRESS NOTES
"Chief Complaint   Patient presents with    sinus pressure, congestion, fatigue      Since Monday        Subjective      Rachana Patrick is a 49 y.o. who presents for rhinorrhea, sinus pain, chills, body aches, fatigue, and throat congestion for 5 days     Objective   Vital Signs:  /68   Pulse 95   Temp 97.8 °F (36.6 °C)   Resp 20   Ht 154.9 cm (61\")   Wt 62.8 kg (138 lb 6.4 oz)   SpO2 99%   BMI 26.15 kg/m²     Physical Exam  Constitutional:       Appearance: Normal appearance. She is ill-appearing.   HENT:      Head: Normocephalic and atraumatic.      Right Ear: Tympanic membrane and ear canal normal.      Left Ear: Tympanic membrane and ear canal normal.      Nose: Congestion and rhinorrhea present.      Right Sinus: Maxillary sinus tenderness present.      Mouth/Throat:      Mouth: Mucous membranes are moist.      Pharynx: Oropharynx is clear.   Eyes:      Conjunctiva/sclera: Conjunctivae normal.   Cardiovascular:      Rate and Rhythm: Normal rate and regular rhythm.      Heart sounds: Normal heart sounds. No murmur heard.  Pulmonary:      Effort: Pulmonary effort is normal. No respiratory distress.      Breath sounds: Normal breath sounds.   Musculoskeletal:      Cervical back: Normal range of motion and neck supple.   Lymphadenopathy:      Cervical: Cervical adenopathy present.   Skin:     General: Skin is warm and dry.   Neurological:      Mental Status: She is alert.   Psychiatric:         Mood and Affect: Mood normal.          Result Review   The following data was reviewed by: Jerel Agudelo MD on 10/04/2024:    Data reviewed : POCT Sars/Flu negative               Assessment and Plan  Diagnoses and all orders for this visit:    1. Acute non-recurrent maxillary sinusitis (Primary)  -     amoxicillin (AMOXIL) 500 MG capsule; Take 2 capsules by mouth 2 (Two) Times a Day.  Dispense: 28 capsule; Refill: 0    2. Sore throat  -     POCT SARS-CoV-2 + Flu Antigen MEDINA            Follow " Up  Return if symptoms worsen or fail to improve.  Patient was given instructions and counseling regarding her condition or for health maintenance advice. Please see specific information pulled into the AVS if appropriate.

## 2024-10-15 ENCOUNTER — PRIOR AUTHORIZATION (OUTPATIENT)
Dept: ENDOCRINOLOGY | Facility: CLINIC | Age: 49
End: 2024-10-15
Payer: COMMERCIAL

## 2024-10-15 NOTE — TELEPHONE ENCOUNTER
PA Case ID #: [id number]  Rx #: [id number]  Need Help? Call us at [phone]  Outcome  Approved today by [place] Medicaid MedImpact 2017  The request has been approved. The authorization is effective from 10/15/2024 to 10/15/2025, as long as the member is enrolled in their current health plan. A written notification letter will follow with additional details.  Authorization Expiration Date: 10/14/2025  Drug  Jardiance 25MG tablets  ePA cloud logo  Form  MedImpact [place] Medicaid ePA Form 2017 NCPDP

## 2024-10-25 ENCOUNTER — OFFICE VISIT (OUTPATIENT)
Dept: FAMILY MEDICINE CLINIC | Facility: CLINIC | Age: 49
End: 2024-10-25
Payer: COMMERCIAL

## 2024-10-25 VITALS
SYSTOLIC BLOOD PRESSURE: 130 MMHG | HEART RATE: 88 BPM | DIASTOLIC BLOOD PRESSURE: 72 MMHG | RESPIRATION RATE: 20 BRPM | OXYGEN SATURATION: 97 % | TEMPERATURE: 97.8 F | BODY MASS INDEX: 25.71 KG/M2 | HEIGHT: 61 IN | WEIGHT: 136.2 LBS

## 2024-10-25 DIAGNOSIS — R21 RASH: Primary | ICD-10-CM

## 2024-10-25 DIAGNOSIS — J30.1 ALLERGIC RHINITIS DUE TO POLLEN, UNSPECIFIED SEASONALITY: ICD-10-CM

## 2024-10-25 PROCEDURE — 3075F SYST BP GE 130 - 139MM HG: CPT | Performed by: FAMILY MEDICINE

## 2024-10-25 PROCEDURE — 3044F HG A1C LEVEL LT 7.0%: CPT | Performed by: FAMILY MEDICINE

## 2024-10-25 PROCEDURE — 3078F DIAST BP <80 MM HG: CPT | Performed by: FAMILY MEDICINE

## 2024-10-25 PROCEDURE — 1126F AMNT PAIN NOTED NONE PRSNT: CPT | Performed by: FAMILY MEDICINE

## 2024-10-25 PROCEDURE — 99213 OFFICE O/P EST LOW 20 MIN: CPT | Performed by: FAMILY MEDICINE

## 2024-10-25 RX ORDER — IPRATROPIUM BROMIDE 21 UG/1
2 SPRAY, METERED NASAL EVERY 12 HOURS
Qty: 30 ML | Refills: 2 | Status: SHIPPED | OUTPATIENT
Start: 2024-10-25

## 2024-10-25 RX ORDER — CLOTRIMAZOLE AND BETAMETHASONE DIPROPIONATE 10; .64 MG/G; MG/G
1 CREAM TOPICAL 2 TIMES DAILY
Qty: 45 G | Refills: 1 | Status: SHIPPED | OUTPATIENT
Start: 2024-10-25

## 2024-10-25 NOTE — PROGRESS NOTES
"Chief Complaint   Patient presents with    rash on R foot and leg        Subjective      Rachana Patrick is a 49 y.o. who presents for Regine rash that is developed on the right medial lower leg and now spread to the back of the leg.  Patient is been applying Neosporin    Objective   Vital Signs:  /72   Pulse 88   Temp 97.8 °F (36.6 °C)   Resp 20   Ht 154.9 cm (61\")   Wt 61.8 kg (136 lb 3.2 oz)   SpO2 97%   BMI 25.73 kg/m²     Physical Exam  Vitals reviewed.   Skin:     Comments: Patient has light pink plaques of varying sizes on the medial and posterior right lower leg   Neurological:      Mental Status: She is alert.          Result Review                     Assessment and Plan  Diagnoses and all orders for this visit:    1. Rash (Primary)  -     clotrimazole-betamethasone (LOTRISONE) 1-0.05 % cream; Apply 1 Application topically to the appropriate area as directed 2 (Two) Times a Day.  Dispense: 45 g; Refill: 1    2. Allergic rhinitis due to pollen, unspecified seasonality  -     ipratropium (ATROVENT) 0.03 % nasal spray; Administer 2 sprays into the nostril(s) as directed by provider Every 12 (Twelve) Hours.  Dispense: 30 mL; Refill: 2    Etiology of the rash is unclear as I think think application of Neosporin has affected the appearance of the rash.  Patient will be given combination topical steroid and antifungal        Follow Up  No follow-ups on file.  Patient was given instructions and counseling regarding her condition or for health maintenance advice. Please see specific information pulled into the AVS if appropriate.       "

## 2024-11-01 ENCOUNTER — HOSPITAL ENCOUNTER (OUTPATIENT)
Dept: GENERAL RADIOLOGY | Facility: HOSPITAL | Age: 49
Discharge: HOME | End: 2024-11-01
Admitting: FAMILY MEDICINE
Payer: COMMERCIAL

## 2024-11-01 ENCOUNTER — OFFICE VISIT (OUTPATIENT)
Dept: FAMILY MEDICINE CLINIC | Facility: CLINIC | Age: 49
End: 2024-11-01
Payer: COMMERCIAL

## 2024-11-01 VITALS
DIASTOLIC BLOOD PRESSURE: 75 MMHG | TEMPERATURE: 97.5 F | BODY MASS INDEX: 25.71 KG/M2 | HEIGHT: 61 IN | WEIGHT: 136.2 LBS | RESPIRATION RATE: 20 BRPM | OXYGEN SATURATION: 97 % | SYSTOLIC BLOOD PRESSURE: 128 MMHG | HEART RATE: 84 BPM

## 2024-11-01 DIAGNOSIS — S46.011A ROTATOR CUFF STRAIN, RIGHT, INITIAL ENCOUNTER: Primary | ICD-10-CM

## 2024-11-01 DIAGNOSIS — S16.1XXA ACUTE STRAIN OF NECK MUSCLE, INITIAL ENCOUNTER: ICD-10-CM

## 2024-11-01 DIAGNOSIS — M79.672 ACUTE FOOT PAIN, LEFT: ICD-10-CM

## 2024-11-01 DIAGNOSIS — S90.32XA CONTUSION OF LEFT FOOT, INITIAL ENCOUNTER: ICD-10-CM

## 2024-11-01 PROCEDURE — 99213 OFFICE O/P EST LOW 20 MIN: CPT | Performed by: FAMILY MEDICINE

## 2024-11-01 PROCEDURE — 73630 X-RAY EXAM OF FOOT: CPT

## 2024-11-01 RX ORDER — METHOCARBAMOL 500 MG/1
500 TABLET, FILM COATED ORAL 4 TIMES DAILY
Qty: 30 TABLET | Refills: 1 | Status: SHIPPED | OUTPATIENT
Start: 2024-11-01
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
## 2024-11-05 ENCOUNTER — TELEPHONE (OUTPATIENT)
Dept: FAMILY MEDICINE CLINIC | Facility: CLINIC | Age: 49
End: 2024-11-05
Payer: COMMERCIAL
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
## 2024-11-05 NOTE — TELEPHONE ENCOUNTER
PATIENT CONTACTED OFFICE OVER XRAY RESULTS. SHE STATED THAT IF THERE IS NO BROKEN BONES WHAT COULD BE CAUSING THE SWELLING. SHE STATED ITS AFFECTING HER DRIVING AND DAILY ACTIVITIES. PLEASE ADVISE

## 2024-11-20 RX ORDER — TIRZEPATIDE 12.5 MG/.5ML
INJECTION, SOLUTION SUBCUTANEOUS
Qty: 2 ML | Refills: 2 | Status: SHIPPED | OUTPATIENT
Start: 2024-11-20

## 2024-11-20 NOTE — TELEPHONE ENCOUNTER
Rx Refill Note  Requested Prescriptions     Pending Prescriptions Disp Refills    Tirzepatide (Mounjaro) 12.5 MG/0.5ML solution auto-injector [Pharmacy Med Name: MOUNJARO 12.5MG/0.5ML INJ (4 PENS)] 2 mL 0     Sig: ADMINISTER 12.5 MG UNDER THE SKIN 1 TIME EVERY WEEK AS DIRECTED      Last office visit with prescribing clinician: 7/1/2024   Last telemedicine visit with prescribing clinician: Visit date not found   Next office visit with prescribing clinician: 1/8/2025                         Would you like a call back once the refill request has been completed: [] Yes [] No    If the office needs to give you a call back, can they leave a voicemail: [] Yes [] No    Jaimee Zimmerman MA  11/20/24, 14:42 EST

## 2024-12-02 RX ORDER — TIRZEPATIDE 12.5 MG/.5ML
INJECTION, SOLUTION SUBCUTANEOUS
Qty: 2 ML | Refills: 2 | OUTPATIENT
Start: 2024-12-02

## 2024-12-02 NOTE — TELEPHONE ENCOUNTER
"    Caller: Rachana Patrick \"Donaldo\"    Relationship: Self    Best call back number: [phone] (home)       Requested Prescriptions:   Requested Prescriptions     Pending Prescriptions Disp Refills    Tirzepatide (Mounjaro) 12.5 MG/0.5ML solution auto-injector 2 mL 2        Pharmacy where request should be sent:  [facility] [address]    Last office visit with prescribing clinician: 7/1/2024   Last telemedicine visit with prescribing clinician: Visit date not found   Next office visit with prescribing clinician: 1/8/2025     Additional details provided by patient:     Does the patient have less than a 3 day supply:  [] Yes  [] No    Would you like a call back once the refill request has been completed: [] Yes [] No    If the office needs to give you a call back, can they leave a voicemail: [] Yes [] No    Pj Villarreal Rep   12/02/24 10:51 EST         "

## 2024-12-03 ENCOUNTER — TELEPHONE (OUTPATIENT)
Dept: FAMILY MEDICINE CLINIC | Facility: CLINIC | Age: 49
End: 2024-12-03
Payer: COMMERCIAL

## 2024-12-03 DIAGNOSIS — I69.398 VISUAL DISTURBANCE AS COMPLICATION OF STROKE: ICD-10-CM

## 2024-12-03 DIAGNOSIS — H53.9 VISUAL DISTURBANCE AS COMPLICATION OF STROKE: ICD-10-CM

## 2024-12-03 DIAGNOSIS — R41.3 MEMORY IMPAIRMENT: ICD-10-CM

## 2024-12-03 DIAGNOSIS — Z86.73 HISTORY OF STROKE: Primary | ICD-10-CM
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
## 2025-01-10 ENCOUNTER — TELEPHONE (OUTPATIENT)
Dept: FAMILY MEDICINE CLINIC | Facility: CLINIC | Age: 50
End: 2025-01-10
Payer: COMMERCIAL
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
## 2025-01-13 ENCOUNTER — TELEPHONE (OUTPATIENT)
Dept: FAMILY MEDICINE CLINIC | Facility: CLINIC | Age: 50
End: 2025-01-13
Payer: COMMERCIAL

## 2025-01-13 NOTE — TELEPHONE ENCOUNTER
"Caller: Rachana Patrick \"Donaldo\"    Relationship: Self    Best call back number: [phone]     What form or medical record are you requesting: LETTER FOR HER DOG FROM 2024    Who is requesting this form or medical record from you: PATIENT    How would you like to receive the form or medical records (pick-up, mail, fax):     Timeframe paperwork needed: 01/13/25    Additional notes: PATIENT STATED THAT SHE WAS GIVEN A LETTER FOR THIS YEAR, BUT SHE NEEDS THE COPY FOR LAST YEAR.         "

## 2025-01-21 ENCOUNTER — OFFICE VISIT (OUTPATIENT)
Dept: FAMILY MEDICINE CLINIC | Facility: CLINIC | Age: 50
End: 2025-01-21
Payer: COMMERCIAL

## 2025-01-21 VITALS
WEIGHT: 131.2 LBS | TEMPERATURE: 97.7 F | DIASTOLIC BLOOD PRESSURE: 64 MMHG | HEIGHT: 61 IN | RESPIRATION RATE: 16 BRPM | SYSTOLIC BLOOD PRESSURE: 132 MMHG | OXYGEN SATURATION: 99 % | HEART RATE: 98 BPM | BODY MASS INDEX: 24.77 KG/M2

## 2025-01-21 DIAGNOSIS — M62.838 CERVICAL PARASPINAL MUSCLE SPASM: ICD-10-CM

## 2025-01-21 DIAGNOSIS — S30.0XXA CONTUSION OF LOWER BACK, INITIAL ENCOUNTER: Primary | ICD-10-CM

## 2025-01-21 PROCEDURE — 99213 OFFICE O/P EST LOW 20 MIN: CPT | Performed by: FAMILY MEDICINE

## 2025-01-21 NOTE — PROGRESS NOTES
"Chief Complaint   Patient presents with    Fall     Last week, slipped on ice on sidewalk, hurts to walk.       Subjective      Rachana Patirck is a 49 y.o. who presents for back injury sustained from a fall on January 15 when patient slipped on some ice landing directly on her back and also striking her head.  She reports pain at the left base of the skull as well as pain in the lower back and left sacral area.  Both pains have improved over the last 48 hours    Objective   Vital Signs:  /64   Pulse 98   Temp 97.7 °F (36.5 °C)   Resp 16   Ht 154.9 cm (61\")   Wt 59.5 kg (131 lb 3.2 oz)   SpO2 99%   BMI 24.79 kg/m²     Physical Exam  Vitals reviewed.   Musculoskeletal:      Cervical back: Spasms and tenderness present.        Back:       Comments: Mild soft tissue tenderness left cervico-occipital junction   Skin:     Findings: No bruising.   Neurological:      Mental Status: She is alert.          Result Review                     Assessment and Plan  Diagnoses and all orders for this visit:    1. Contusion of lower back, initial encounter (Primary)    2. Cervical paraspinal muscle spasm    Plan  1.  Recommended heating pad and Tylenol for her lower back  2.  Recommended heating pad for her neck as well as Tylenol.  Patient has muscle relaxants which she can use although this is likely unnecessary as pain is improving        Follow Up  No follow-ups on file.  Patient was given instructions and counseling regarding her condition or for health maintenance advice. Please see specific information pulled into the AVS if appropriate.       "

## 2025-01-23 ENCOUNTER — OFFICE VISIT (OUTPATIENT)
Dept: ENDOCRINOLOGY | Facility: CLINIC | Age: 50
End: 2025-01-23
Payer: COMMERCIAL

## 2025-01-23 ENCOUNTER — SPECIALTY PHARMACY (OUTPATIENT)
Dept: ENDOCRINOLOGY | Facility: CLINIC | Age: 50
End: 2025-01-23
Payer: COMMERCIAL

## 2025-01-23 VITALS
OXYGEN SATURATION: 100 % | HEART RATE: 91 BPM | DIASTOLIC BLOOD PRESSURE: 80 MMHG | HEIGHT: 61 IN | SYSTOLIC BLOOD PRESSURE: 122 MMHG | WEIGHT: 130 LBS | BODY MASS INDEX: 24.55 KG/M2

## 2025-01-23 DIAGNOSIS — Z79.4 LONG-TERM INSULIN USE: ICD-10-CM

## 2025-01-23 DIAGNOSIS — Z79.4 TYPE 2 DIABETES MELLITUS WITH HYPERGLYCEMIA, WITH LONG-TERM CURRENT USE OF INSULIN: ICD-10-CM

## 2025-01-23 DIAGNOSIS — E55.9 VITAMIN D DEFICIENCY: ICD-10-CM

## 2025-01-23 DIAGNOSIS — Z79.4 TYPE 2 DIABETES MELLITUS WITH DIABETIC POLYNEUROPATHY, WITH LONG-TERM CURRENT USE OF INSULIN: Primary | ICD-10-CM

## 2025-01-23 DIAGNOSIS — Z79.4 TYPE 2 DIABETES MELLITUS WITH DIABETIC POLYNEUROPATHY, WITH LONG-TERM CURRENT USE OF INSULIN: ICD-10-CM

## 2025-01-23 DIAGNOSIS — R74.8 ALKALINE PHOSPHATASE ELEVATION: ICD-10-CM

## 2025-01-23 DIAGNOSIS — E11.42 TYPE 2 DIABETES MELLITUS WITH DIABETIC POLYNEUROPATHY, WITH LONG-TERM CURRENT USE OF INSULIN: ICD-10-CM

## 2025-01-23 DIAGNOSIS — E11.65 TYPE 2 DIABETES MELLITUS WITH HYPERGLYCEMIA, WITH LONG-TERM CURRENT USE OF INSULIN: ICD-10-CM

## 2025-01-23 DIAGNOSIS — E11.42 TYPE 2 DIABETES MELLITUS WITH DIABETIC POLYNEUROPATHY, WITH LONG-TERM CURRENT USE OF INSULIN: Primary | ICD-10-CM

## 2025-01-23 LAB
25(OH)D3 SERPL-MCNC: 19.4 NG/ML (ref 30–100)
ALBUMIN SERPL-MCNC: 4.1 G/DL (ref 3.5–5.2)
ALBUMIN/GLOB SERPL: 1.3 G/DL
ALP SERPL-CCNC: 125 U/L (ref 39–117)
ALT SERPL W P-5'-P-CCNC: 17 U/L (ref 1–33)
ANION GAP SERPL CALCULATED.3IONS-SCNC: 8.1 MMOL/L (ref 5–15)
AST SERPL-CCNC: 19 U/L (ref 1–32)
BILIRUB SERPL-MCNC: 0.3 MG/DL (ref 0–1.2)
BUN SERPL-MCNC: 16 MG/DL (ref 6–20)
BUN/CREAT SERPL: 25.4 (ref 7–25)
CALCIUM SPEC-SCNC: 10.3 MG/DL (ref 8.6–10.5)
CHLORIDE SERPL-SCNC: 102 MMOL/L (ref 98–107)
CHOLEST SERPL-MCNC: 117 MG/DL (ref 0–200)
CO2 SERPL-SCNC: 25.9 MMOL/L (ref 22–29)
CREAT SERPL-MCNC: 0.63 MG/DL (ref 0.57–1)
EGFRCR SERPLBLD CKD-EPI 2021: 107.6 ML/MIN/1.73
EXPIRATION DATE: ABNORMAL
EXPIRATION DATE: ABNORMAL
GLOBULIN UR ELPH-MCNC: 3.1 GM/DL
GLUCOSE BLDC GLUCOMTR-MCNC: 137 MG/DL (ref 70–130)
GLUCOSE SERPL-MCNC: 77 MG/DL (ref 65–99)
HBA1C MFR BLD: 6.4 % (ref 4.5–5.7)
HDLC SERPL-MCNC: 50 MG/DL (ref 40–60)
LDLC SERPL CALC-MCNC: 48 MG/DL (ref 0–100)
LDLC/HDLC SERPL: 0.93 {RATIO}
Lab: ABNORMAL
Lab: ABNORMAL
POTASSIUM SERPL-SCNC: 3.9 MMOL/L (ref 3.5–5.2)
PROT SERPL-MCNC: 7.2 G/DL (ref 6–8.5)
SODIUM SERPL-SCNC: 136 MMOL/L (ref 136–145)
TRIGL SERPL-MCNC: 103 MG/DL (ref 0–150)
TSH SERPL DL<=0.05 MIU/L-ACNC: 0.8 UIU/ML (ref 0.27–4.2)
VLDLC SERPL-MCNC: 19 MG/DL (ref 5–40)

## 2025-01-23 PROCEDURE — 82306 VITAMIN D 25 HYDROXY: CPT | Performed by: INTERNAL MEDICINE

## 2025-01-23 PROCEDURE — 82570 ASSAY OF URINE CREATININE: CPT | Performed by: INTERNAL MEDICINE

## 2025-01-23 PROCEDURE — 83970 ASSAY OF PARATHORMONE: CPT | Performed by: INTERNAL MEDICINE

## 2025-01-23 PROCEDURE — 80061 LIPID PANEL: CPT | Performed by: INTERNAL MEDICINE

## 2025-01-23 PROCEDURE — 80053 COMPREHEN METABOLIC PANEL: CPT | Performed by: INTERNAL MEDICINE

## 2025-01-23 PROCEDURE — 82043 UR ALBUMIN QUANTITATIVE: CPT | Performed by: INTERNAL MEDICINE

## 2025-01-23 PROCEDURE — 84443 ASSAY THYROID STIM HORMONE: CPT | Performed by: INTERNAL MEDICINE

## 2025-01-23 RX ORDER — ACYCLOVIR 400 MG/1
1 TABLET ORAL DAILY
Qty: 1 EACH | Refills: 0 | Status: SHIPPED | OUTPATIENT
Start: 2025-01-23

## 2025-01-23 RX ORDER — LANCETS 30 GAUGE
1 EACH MISCELLANEOUS 4 TIMES DAILY
Qty: 400 EACH | Refills: 3 | Status: SHIPPED | OUTPATIENT
Start: 2025-01-23

## 2025-01-23 RX ORDER — EMPAGLIFLOZIN 25 MG/1
25 TABLET, FILM COATED ORAL DAILY
Qty: 90 TABLET | Refills: 3 | Status: SHIPPED | OUTPATIENT
Start: 2025-01-23

## 2025-01-23 RX ORDER — TIRZEPATIDE 15 MG/.5ML
15 INJECTION, SOLUTION SUBCUTANEOUS
Qty: 6 ML | Refills: 1 | Status: SHIPPED | OUTPATIENT
Start: 2025-01-23

## 2025-01-23 RX ORDER — ACYCLOVIR 400 MG/1
1 TABLET ORAL
Qty: 9 EACH | Refills: 3 | Status: SHIPPED | OUTPATIENT
Start: 2025-01-23

## 2025-01-23 RX ORDER — DIPHENHYDRAMINE HYDROCHLORIDE 25 MG/1
1 CAPSULE, LIQUID FILLED ORAL 4 TIMES DAILY
Qty: 1 EACH | Refills: 0 | Status: SHIPPED | OUTPATIENT
Start: 2025-01-23

## 2025-01-23 NOTE — PATIENT INSTRUCTIONS
Patients with diabetes should have a yearly eye exam. Please be sure to schedule this at least once yearly and have the eye exam report faxed to [phone].

## 2025-01-23 NOTE — PROGRESS NOTES
"Chief Complaint   Patient presents with    Diabetes     Type II.          HPI   Rachana Patrick is a 49 y.o. female had concerns including Diabetes (Type II.).    She is checking blood sugars 4+ times per day with dexcom CGM. Data reviewed from the last two weeks shows average glucose 154 with variability of 21.1%. In target range 84%, high 15%, very high 1%, low 0%, very low 0%.   Pattern of: hyperglycemia through the day - quick spikes in BG - not associated with meals/drink.     Drinks a mocha from [employer] regularly (isn't sugar free). Sips all day.     Current medications for diabetes include Jardiance 25 mg daily, Mounjaro 12.5 mg weekly.  Wants to increase the mounjaro.     A1c is improved to 6.4.  Weight has continued to trend down.    She hasn't started vit D despite low levels in 2/2024.     The following portions of the patient's history were reviewed and updated as appropriate: allergies, current medications, past family history, past medical history, past social history, past surgical history, and problem list.      Review of Systems   Constitutional: Negative.    Endocrine: Negative.         See HPI        Physical Exam  Vitals reviewed.   Constitutional:       Appearance: Normal appearance.   Cardiovascular:      Rate and Rhythm: Normal rate.   Pulmonary:      Effort: Pulmonary effort is normal.   Neurological:      General: No focal deficit present.      Mental Status: She is alert. Mental status is at baseline.   Psychiatric:         Mood and Affect: Mood normal.         Behavior: Behavior normal.        /80 (BP Location: Right arm, Patient Position: Sitting, Cuff Size: Adult)   Pulse 91   Ht 154.9 cm (60.98\")   Wt 59 kg (130 lb)   SpO2 100%   BMI 24.58 kg/m²      Labs and imaging    A1C:  Lab Results   Component Value Date    HGBA1C 6.4 (A) 01/23/2025    HGBA1C 6.6 (A) 07/01/2024      Glucose:  Lab Results   Component Value Date    POCGLU 137 (A) 01/23/2025      CMP:  Lab Results "   Component Value Date    GLUCOSE 93 02/19/2024    BUN 13 02/19/2024    CREATININE 0.70 02/19/2024     02/19/2024    K 4.1 02/19/2024     02/19/2024    CALCIUM 10.1 02/19/2024    PROTEINTOT 8.0 02/19/2024    ALBUMIN 4.5 02/19/2024    ALT 28 02/19/2024    AST 29 02/19/2024    ALKPHOS 129 (H) 02/19/2024    BILITOT 0.3 02/19/2024    GLOB 3.5 02/19/2024    AGRATIO 1.3 02/19/2024    BCR 18.6 02/19/2024    ANIONGAP 12.5 02/19/2024    EGFR 105.5 02/19/2024      Lipid Panel:  Lab Results   Component Value Date    CHOL 126 01/23/2024    TRIG 151 (H) 01/23/2024    HDL 40 01/23/2024    LDL 60 01/23/2024      Urine Microalbumin:  Lab Results   Component Value Date    MALBCRERATIO  01/23/2024      Comment:      Unable to calculate      TSH:  Lab Results   Component Value Date    TSH 0.836 01/23/2024      Lab Results   Component Value Date    ENJR38GP 21.5 (L) 02/19/2024    PTH 27.4 02/19/2024         Assessment and plan  Diagnoses and all orders for this visit:    1. Type 2 diabetes mellitus with diabetic polyneuropathy, with long-term current use of insulin (Primary)  Mostly controlled with A1c down 6.4. History of neuropathy.   Intolerant to metformin.   Stay on jardiance 25 mg daily.   Increase mounjaro 15 mg weekly. Pt hoping for more weight loss.  Labs are due. MF will be updated at follow-up visit. Ophtho exam should be updated yearly and requested the report be sent.   -     POC Glucose, Blood  -     POC Glycosylated Hemoglobin (Hb A1C)  -     Comprehensive Metabolic Panel  -     Microalbumin / Creatinine Urine Ratio - Urine, Clean Catch  -     TSH  -     Lipid Panel  -     Tirzepatide (Mounjaro) 15 MG/0.5ML solution auto-injector; Inject 15 mg under the skin into the appropriate area as directed Every 7 (Seven) Days.  Dispense: 6 mL; Refill: 1  -     Jardiance 25 MG tablet tablet; Take 1 tablet by mouth Daily.  Dispense: 90 tablet; Refill: 3    2. Vitamin D deficiency  Hasn't started vit D supplement. Recheck  today. Advised 2000 IU in the past.   -     Vitamin D,25-Hydroxy  -     PTH, Intact    3. Alkaline phosphatase elevation  Could be d/t vit d deficiency. Recheck today.        Return in about 6 months (around 7/23/2025) for next scheduled follow up. The patient was instructed to contact the clinic with any interval questions or concerns.    Electronically signed by: Yuki Hughes DO   Endocrinologist    Please note that portions of this note were completed with a voice recognition program.

## 2025-01-23 NOTE — PROGRESS NOTES
" Specialty Pharmacy Patient Management Program  Endocrinology Initial Fill Outreach      Rachana \"Marleen" is a 49 y.o. female contacted today regarding initial fill of her medication(s).    Specialty medication(s) and dose(s) confirmed: Mounjaro, Jardiance    Delivery Questions      Flowsheet Row Most Recent Value   Delivery method [employer]   Delivery address verified with patient/caregiver? Yes   Delivery address Home   Number of medications in delivery 6   Medication(s) being filled and delivered Tirzepatide (Mounjaro), Empagliflozin (Jardiance), Continuous Glucose Sensor (Dexcom G7 Sensor), Glucose Blood, Blood Glucose Monitoring Suppl, Lancets (OneTouch Delica Plus [id number])   Copay verified? Yes   Copay amount 0   Copay form of payment No copayment ($0)   Ship Date 1/27   Delivery Date Selection 01/28/25   Signature Required Yes            Follow-Up: 90d    Aisha Arboleda, Pharm.D. BCPS, BCCCP  Clinical Specialty Pharmacist, Endocrinology   14:20 EST  01/23/25    "

## 2025-01-23 NOTE — PROGRESS NOTES
Specialty Pharmacy Patient Management Program  Endocrinology Initial Assessment     Rachana Patrick was referred by an Endocrinology provider to the Endocrinology Patient Management program offered by [facility] for Type 2 Diabetes on 01/23/25.  An initial outreach was conducted, including assessment of therapy appropriateness and specialty medication education for Jardiance and Mounjaro. The patient was introduced to services offered by [facility], including: regular assessments, refill coordination, curbside pick-up or mail order delivery options, prior authorization maintenance, and financial assistance programs as applicable. The patient was also provided with contact information for the pharmacy team.     Insurance Coverage & Financial Support  Medicaid     Relevant Past Medical History and Comorbidities  Relevant medical history and concomitant health conditions were discussed with the patient. The patient's chart has been reviewed for relevant past medical history and comorbid conditions and updated as necessary.  Past Medical History:   Diagnosis Date    CVA (cerebral vascular accident) 10/22/2022    Hyperlipidemia     Hypertension     Type 2 diabetes mellitus      Social History     Socioeconomic History    Marital status: Single   Tobacco Use    Smoking status: Some Days     Current packs/day: 0.50     Average packs/day: 0.5 packs/day for 15.0 years (7.5 ttl pk-yrs)     Types: Cigarettes     Passive exposure: Current    Smokeless tobacco: Never   Vaping Use    Vaping status: Never Used   Substance and Sexual Activity    Alcohol use: Yes     Comment: social    Drug use: Not Currently    Sexual activity: Defer       Problem list reviewed by Aisha Arboleda, PharmD on 1/23/2025 at  2:20 PM    Allergies  Known allergies and reactions were discussed with the patient. The patient's chart has been reviewed for  allergy information and updated as necessary.    Allergies   Allergen Reactions    Metformin GI Intolerance    Ozempic (0.25 Or 0.5 Mg-Dose) [Semaglutide(0.25 Or 0.5mg-Dos)] GI Intolerance       Allergies reviewed by Aisha Arboleda, PharmD on 1/23/2025 at  2:20 PM    Relevant Laboratory Values  Relevant laboratory values were discussed with the patient. The following specialty medication dose adjustment(s) are recommended: Increase Mounjaro to 15 mg weekly  A1C Last 3 Results          7/1/2024    13:46 1/23/2025    13:22   HGBA1C Last 3 Results   Hemoglobin A1C 6.6  6.4      Lab Results   Component Value Date    HGBA1C 6.4 (A) 01/23/2025     Lab Results   Component Value Date    GLUCOSE 93 02/19/2024    CALCIUM 10.1 02/19/2024     02/19/2024    K 4.1 02/19/2024    CO2 23.5 02/19/2024     02/19/2024    BUN 13 02/19/2024    CREATININE 0.70 02/19/2024    BCR 18.6 02/19/2024    ANIONGAP 12.5 02/19/2024     Lab Results   Component Value Date    CHOL 126 01/23/2024    TRIG 151 (H) 01/23/2024    HDL 40 01/23/2024    LDL 60 01/23/2024         Current Medication List  This medication list has been reviewed with the patient and evaluated for any interactions or necessary modifications/recommendations, and updated to include all prescription medications, OTC medications, and supplements the patient is currently taking.  This list reflects what is contained in the patient's profile, which has also been marked as reviewed to communicate to other providers it is the most up to date version of the patient's current medication therapy.     Current Outpatient Medications:     Blood Glucose Monitoring Suppl (Blood Glucose Monitor System) w/Device kit, Use 1 each 4 (Four) Times a Day., Disp: 1 each, Rfl: 0    Continuous Glucose  (Dexcom G7 ) device, Use 1 each Daily., Disp: 1 each, Rfl: 0    Continuous Glucose Sensor (Dexcom G7 Sensor) misc, Use 1 each Every 10 (Ten) Days., Disp: 9 each, Rfl: 3    glucose blood test strip, 1 each by Other route 4 (Four)  Times a Day. One Touch Verio Test Strips.  Use To test glucose qid., Disp: 400 each, Rfl: 3    Lancets (OneTouch Delica Plus [id number]) misc, 1 each by Other route 4 (Four) Times a Day., Disp: 400 each, Rfl: 3    albuterol sulfate  (90 Base) MCG/ACT inhaler, INHALE 2 PUFFS PO Q 6 H PRN, Disp: , Rfl: 0    amLODIPine (NORVASC) 10 MG tablet, Take 1 tablet by mouth Daily., Disp: 90 tablet, Rfl: 1    aspirin 325 MG EC tablet, Take 1 tablet by mouth Every 6 (Six) Hours As Needed for Mild Pain., Disp: 30 tablet, Rfl: 11    atorvastatin (Lipitor) 40 MG tablet, Take 1 tablet by mouth Daily., Disp: 30 tablet, Rfl: 11    clotrimazole-betamethasone (LOTRISONE) 1-0.05 % cream, Apply 1 Application topically to the appropriate area as directed 2 (Two) Times a Day., Disp: 45 g, Rfl: 1    ipratropium (ATROVENT) 0.03 % nasal spray, Administer 2 sprays into the nostril(s) as directed by provider Every 12 (Twelve) Hours., Disp: 30 mL, Rfl: 2    Jardiance 25 MG tablet tablet, Take 1 tablet by mouth Daily., Disp: 90 tablet, Rfl: 3    losartan (COZAAR) 100 MG tablet, Take 1 tablet by mouth Daily. Hold unless SBP > 160, Disp: 90 tablet, Rfl: 1    methocarbamol (ROBAXIN) 500 MG tablet, Take 1 tablet by mouth 4 (Four) Times a Day., Disp: 30 tablet, Rfl: 1    pregabalin (LYRICA) 300 MG capsule, Take 1 capsule by mouth 2 (Two) Times a Day., Disp: 60 capsule, Rfl: 5    Tirzepatide (Mounjaro) 15 MG/0.5ML solution auto-injector, Inject 15 mg under the skin into the appropriate area as directed Every 7 (Seven) Days., Disp: 6 mL, Rfl: 1    Medicines reviewed by Aisha Arboleda, PharmD on 1/23/2025 at  2:20 PM    Drug Interactions  No Clinically Significant DDIs Were Identified at Present Time Upon Marking Medications Reviewed    Recommended Medications Assessment  Aspirin: Not Taking Currently  Statin: Currently Taking   ACEi/ARB: Currently Taking     Initial Education Provided for Specialty Medication  The patient has been provided with the  following education and any applicable administration techniques (i.e. self-injection) have been demonstrated for the therapies indicated. All questions and concerns have been addressed prior to the patient receiving the medication, and the patient has verbalized comprehension of the education and any materials provided. Additional patient education shall be provided and documented upon request by the patient, provider, or payer.    Mounjaro® (tirzepatide)   How long will I be on this medication for?  The amount of time you will be on this medication will be determined by your doctor based on blood sugar and A1c control. You will most likely be on this medication or another diabetes medication throughout your lifetime. Do not abruptly stop this medication without talking to your doctor first.     How do I take this medication?  Take as directed on your prescription label. Mounjaro is supplied in a single-use pen for each dose and you will use a new pre-filled pen each week.  It is injected under the skin (subcutaneously) of your stomach, thigh or upper arm.  You may inject in the same body area each week, on the same day each week, with or without food.      What are some possible side effects?  In addition to decreased appetite, the most common side effects are nausea and indigestion. Redness, itching, and/or swelling at the injection site may occur. You should also monitor for low blood sugar (hypoglycemia) if you are taking Mounjaro with other medications that cause low blood sugar.     What happens if I miss a dose?  If you miss a dose, take it as soon as you remember as long as there are at least 3 days until the next scheduled dose.  If there are less than 3 days, skip the missed dose and resume  Mounjaro on the regularly scheduled day.  Do not take 2 doses at the same time or extra doses.      Medication Safety    What are things I should warn my doctor immediately about?  Do not use Mounjaro if you or a  family member have ever had medullary thyroid cancer (MTC) or Multiple Endocrine Neoplasia syndrome type 2 (MEN 2).  Tell your doctor if you have or have had problems with your kidneys or pancreas.  Talk to your doctor if you are pregnant, planning to become pregnant, or breastfeeding. Stop using Mounjaro and get medical help right away if you have severe pain in your stomach area that will not go away, or if you notice any signs/symptoms of an allergic reaction (rash, hives, difficulty breathing, etc.).    What are things that I should be cautious of?  Be cautious of any side effects from this medication. Talk to your doctor if any new ones develop or aren't getting better.    What are some medications that can interact with this one?  Taking Mounjaro with other medications that also lower your blood sugar such as insulin and glipizide/glimepiride/glyburide may increase the risk of low blood sugar. Your doctor may reduce the dose of these medications when you start Mounjaro to minimize low blood sugars.  Always tell your doctor or pharmacist immediately if you start taking any new medications, including over-the-counter medications, vitamins, and herbal supplements.        Medication Storage/Handling    How should I handle this medication?  Keep this medication out of reach of pets/children and keep the pen capped when not in use.  Do not share your medicine pens with others.    How does this medication need to be stored?  Store Mounjaro in the refrigerator. Do not freeze and do not use if Mounjaro has been frozen.  You may store your Mounjaro pens at room temperature for up to 21 days.  Protect from excessive heat and sunlight.  Keep in the original carton until time of administration.    How should I dispose of this medication?  Used Mounjaro pens should discarded after each use in an approved sharps container after use.  If you do not have a sharps container, you may use a household container made of heavy-duty  plastic with a tight-fitting lid that is leak resistant (e.g., heavy-duty plastic laundry detergent bottle). If your doctor decides to stop this medication, take to your local police station for proper disposal. Some pharmacies also have take-back bins for medication drop-off.       Resources/Support    How can I remind myself to take this medication?  You can download reminder apps to help you manage your refills. You may also set an alarm on your phone to remind you.     Is financial support available?   [employer] can provide co-pay cards if you have commercial insurance or patient assistance if you have Medicare or no insurance.     Which vaccines are recommended for me?  Talk to your doctor about these vaccines: Flu, Coronavirus (COVID-19), Pneumococcal (pneumonia), Tdap, Hepatitis B, Zoster (shingles)     JARDIANCE® (empagliflozin)  Medication Expectations   Why am I taking this medication? You could be taking this medication for several reasons:  To lower blood sugar because you have type 2 diabetes  To reduce your risk of death from heart attack or stroke if you have heart disease and type 2 diabetes  To reduce your risk of death or hospitalization for heart failure  To reduce your risk of further kidney damage, death, or hospitalization if you have chronic kidney disease   What should I expect while on this medication? You should expect to see your blood sugar and A1c decrease over time if you have diabetes. You may also see a decrease in your blood pressure and it can help some people lose weight.     How does the medication work? Jardiance works by helping to remove some sugar that the body doesn't need through urination.    How long will I be on this medication for? The amount of time you will be on this medication will be determined by your doctor based on blood sugar and A1c control. You will most likely be on this medication or another diabetes medication throughout your lifetime. Do not abruptly stop  this medication without talking to your doctor first.    How do I take this medication? Take as directed on your prescription label. This medication is usually taken in the morning and can be given with or without food.    What are some possible side effects? You may notice increased urination, especially when you first start Jardiance. The most common side effects are urinary tract infections and yeast infections and are more commonly seen in females. Talk with your doctor if you notice white or yellow vaginal discharge, vaginal itching or odor of if you notice redness, itching, pain, or swelling of the penis and/or bad-smelling discharge from the penis.    What happens if I miss a dose? If you miss a dose, take it as soon as you remember. If it is close to your next dose, skip it (do not take 2 doses at once)     Medication Safety   What are things I should warn my doctor immediately about? Tell your doctor if you have kidney disease, liver disease, heart failure, pancreas problems, or history of frequent genital yeast or urinary tract infections. Tell your doctor if you are on a low-salt diet, if you drink alcohol, or if you are having surgery. Talk to your doctor if you are pregnant, planning to become pregnant, or breastfeeding. Also tell your doctor if you notice any signs/symptoms of an allergic reaction (rash, hives, difficulty breathing, etc.).   What are things that I should be cautious of? Be cautious of any side effects from this medication. Talk to your doctor if any new ones develop or aren't getting better.   What are some medications that can interact with this one? Some medications that interact include diuretics (water pills) and other medications that may also lower your blood sugar such as insulins and glipizide/glimepiride/glyburide. Your doctor may reduce the dose of these medications when you start Jardiance to minimize low blood sugars. Always tell your doctor or pharmacist immediately if you  start taking any new medications, including over-the-counter medications, vitamins, and herbal supplements.      Medication Storage/Handling   How should I handle this medication? Keep this medication out of reach of pets/children in tightly sealed container   How does this medication need to be stored? Store at room temperature and keep dry (don't keep in bathroom or other room with moisture)   How should I dispose of this medication? There should not be a need to dispose of this medication unless your provider decides to change the dose or therapy. If that is the case, take to your local police station for proper disposal. Some pharmacies also have take-back bins for medication drop-off.      Resources/Support   How can I remind myself to take this medication? You can download reminder apps to help you manage your refills. You may also set an alarm on your phone to remind you. The pharmacy carries pill boxes that you can place next to an area you pass everyday (such as where you place your car keys or where you charge your phone)   Is financial support available?  [employer] can provide co-pay cards if you have commercial insurance or patient assistance if you have Medicare or no insurance.    Which vaccines are recommended for me? Talk to your doctor about these vaccines: Flu, Coronavirus (COVID-19), Pneumococcal (pneumonia), Tdap, Hepatitis B, Zoster (shingles)          Adherence and Self-Administration  Adherence related to the patient's specialty therapy was discussed with the patient. The Adherence segment of this outreach has been reviewed and updated.     Is there a concern with patient's ability to self administer the medication correctly and without issue?: No  Were any potential barriers to adherence identified during the initial assessment or patient education?: No  Are there any concerns regarding the patient's understanding of the importance of medication adherence?: No  Methods for  Supporting Patient Adherence and/or Self-Administration: None    Open Medication Therapy Problems  No medication therapy recommendations to display    Goals of Therapy  Goals related to the patient's specialty therapy were discussed with the patient. The Patient Goals segment of this outreach has been reviewed and updated.   Goals Addressed Today        Specialty Pharmacy General Goal      A1C < 7 %     Lab Results    Component Value Date     HGBA1C 6.4 (A) 01/23/2025 Initial enrollment. Increasing Mounjaro to 15 mg weekly. MS                 Reassessment Plan & Follow-Up  1. Medication Therapy Changes: Increase Mounjaro to 15 mg weekly.  2. Related Plans, Therapy Recommendations, or Therapy Problems to Be Addressed: None.  3. Pharmacist to perform regular assessments no more than (6) months from the previous assessment.  4. Care Coordinator to set up future refill outreaches, coordinate prescription delivery, and escalate clinical questions to pharmacist.  5. Welcome information and patient satisfaction survey to be sent by specialty pharmacy team with patient's initial fill.    Attestation  Therapeutic appropriateness: Appropriate   I attest the patient was actively involved in and has agreed to the above plan of care. If the prescribed therapy is at any point deemed not appropriate based on the current or future assessments, a consultation will be initiated with the patient's specialty care provider to determine the best course of action. The revised plan of therapy will be documented along with any required assessments and/or additional patient education provided.     Aisha Arboleda, PharmD, BCCCP, BCPS  Clinical Specialty Pharmacist, Endocrinology  1/23/2025  14:21 EST    Discussed the aforementioned information with the patient via In-Person.

## 2025-01-24 LAB
ALBUMIN UR-MCNC: <1.2 MG/DL
CREAT UR-MCNC: 44.8 MG/DL
MICROALBUMIN/CREAT UR: NORMAL MG/G{CREAT}
PTH-INTACT SERPL-MCNC: 29 PG/ML (ref 15–65)

## 2025-01-30 ENCOUNTER — OFFICE VISIT (OUTPATIENT)
Age: 50
End: 2025-01-30
Payer: COMMERCIAL

## 2025-01-30 VITALS
HEIGHT: 61 IN | HEART RATE: 87 BPM | OXYGEN SATURATION: 98 % | WEIGHT: 127.56 LBS | DIASTOLIC BLOOD PRESSURE: 84 MMHG | SYSTOLIC BLOOD PRESSURE: 140 MMHG | BODY MASS INDEX: 24.08 KG/M2

## 2025-01-30 DIAGNOSIS — G44.89 OTHER HEADACHE SYNDROME: ICD-10-CM

## 2025-01-30 DIAGNOSIS — Z86.73 H/O: STROKE: ICD-10-CM

## 2025-01-30 DIAGNOSIS — M79.672 LEFT FOOT PAIN: ICD-10-CM

## 2025-01-30 DIAGNOSIS — M54.2 CHRONIC NECK PAIN: ICD-10-CM

## 2025-01-30 DIAGNOSIS — G89.29 CHRONIC LEFT-SIDED LOW BACK PAIN WITHOUT SCIATICA: Primary | ICD-10-CM

## 2025-01-30 DIAGNOSIS — H53.9 VISION CHANGES: ICD-10-CM

## 2025-01-30 DIAGNOSIS — F41.8 SITUATIONAL ANXIETY: ICD-10-CM

## 2025-01-30 DIAGNOSIS — M54.50 CHRONIC LEFT-SIDED LOW BACK PAIN WITHOUT SCIATICA: Primary | ICD-10-CM

## 2025-01-30 DIAGNOSIS — G89.29 CHRONIC NECK PAIN: ICD-10-CM

## 2025-01-30 PROBLEM — N30.00 ACUTE CYSTITIS WITHOUT HEMATURIA: Status: RESOLVED | Noted: 2022-11-15 | Resolved: 2025-01-30

## 2025-01-30 PROBLEM — R41.82 ALTERED MENTAL STATUS, UNSPECIFIED ALTERED MENTAL STATUS TYPE: Status: RESOLVED | Noted: 2022-10-31 | Resolved: 2025-01-30

## 2025-01-30 PROBLEM — I63.81 THALAMIC STROKE: Status: RESOLVED | Noted: 2022-11-14 | Resolved: 2025-01-30

## 2025-01-30 PROBLEM — I63.9 CEREBROVASCULAR ACCIDENT (CVA), UNSPECIFIED MECHANISM: Status: RESOLVED | Noted: 2022-10-22 | Resolved: 2025-01-30

## 2025-01-30 PROBLEM — H53.8 BLURRY VISION, RIGHT EYE: Status: RESOLVED | Noted: 2022-10-31 | Resolved: 2025-01-30

## 2025-01-30 PROBLEM — I63.81 THALAMIC INFARCT, ACUTE: Status: RESOLVED | Noted: 2022-10-24 | Resolved: 2025-01-30

## 2025-01-30 PROBLEM — S82.892A CLOSED FRACTURE OF LEFT ANKLE: Status: RESOLVED | Noted: 2023-11-29 | Resolved: 2025-01-30

## 2025-01-30 PROBLEM — E66.09 CLASS 1 OBESITY DUE TO EXCESS CALORIES WITH SERIOUS COMORBIDITY AND BODY MASS INDEX (BMI) OF 34.0 TO 34.9 IN ADULT: Status: RESOLVED | Noted: 2020-06-15 | Resolved: 2025-01-30

## 2025-01-30 PROBLEM — I61.3 LEFT-SIDED NONTRAUMATIC INTRACEREBRAL HEMORRHAGE OF BRAINSTEM: Status: RESOLVED | Noted: 2022-11-15 | Resolved: 2025-01-30

## 2025-01-30 PROBLEM — E66.811 CLASS 1 OBESITY DUE TO EXCESS CALORIES WITH SERIOUS COMORBIDITY AND BODY MASS INDEX (BMI) OF 34.0 TO 34.9 IN ADULT: Status: RESOLVED | Noted: 2020-06-15 | Resolved: 2025-01-30

## 2025-01-30 PROBLEM — R51.9 HEADACHE: Status: RESOLVED | Noted: 2022-10-31 | Resolved: 2025-01-30

## 2025-01-30 PROCEDURE — 1159F MED LIST DOCD IN RCRD: CPT | Performed by: FAMILY MEDICINE

## 2025-01-30 PROCEDURE — 3077F SYST BP >= 140 MM HG: CPT | Performed by: FAMILY MEDICINE

## 2025-01-30 PROCEDURE — 3044F HG A1C LEVEL LT 7.0%: CPT | Performed by: FAMILY MEDICINE

## 2025-01-30 PROCEDURE — 99214 OFFICE O/P EST MOD 30 MIN: CPT | Performed by: FAMILY MEDICINE

## 2025-01-30 PROCEDURE — 1160F RVW MEDS BY RX/DR IN RCRD: CPT | Performed by: FAMILY MEDICINE

## 2025-01-30 PROCEDURE — 3079F DIAST BP 80-89 MM HG: CPT | Performed by: FAMILY MEDICINE

## 2025-01-30 PROCEDURE — 1126F AMNT PAIN NOTED NONE PRSNT: CPT | Performed by: FAMILY MEDICINE

## 2025-01-30 RX ORDER — BACLOFEN 10 MG/1
10 TABLET ORAL 2 TIMES DAILY PRN
Qty: 60 TABLET | Refills: 1 | Status: SHIPPED | OUTPATIENT
Start: 2025-01-30
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
## 2025-02-03 ENCOUNTER — TELEPHONE (OUTPATIENT)
Age: 50
End: 2025-02-03
Payer: COMMERCIAL

## 2025-02-03 ENCOUNTER — OFFICE VISIT (OUTPATIENT)
Dept: ORTHOPEDIC SURGERY | Facility: CLINIC | Age: 50
End: 2025-02-03
Payer: COMMERCIAL

## 2025-02-03 VITALS
SYSTOLIC BLOOD PRESSURE: 122 MMHG | BODY MASS INDEX: 24.1 KG/M2 | HEIGHT: 61 IN | WEIGHT: 127.65 LBS | DIASTOLIC BLOOD PRESSURE: 84 MMHG

## 2025-02-03 DIAGNOSIS — I65.23 CALCIFICATION OF BOTH CAROTID ARTERIES: Primary | ICD-10-CM

## 2025-02-03 DIAGNOSIS — M79.672 LEFT FOOT PAIN: Primary | ICD-10-CM

## 2025-02-03 DIAGNOSIS — S99.922A INJURY OF LEFT FOOT, INITIAL ENCOUNTER: ICD-10-CM

## 2025-02-03 DIAGNOSIS — Z86.73 H/O: STROKE: ICD-10-CM

## 2025-02-03 NOTE — TELEPHONE ENCOUNTER
"Name: Rachana Patrick \"Donaldo\"    Relationship: Self    Best Callback Number:       [phone] (Mobile)     HUB PROVIDED THE RELAY MESSAGE FROM THE OFFICE   PATIENT     HAS FURTHER QUESTIONS AND WOULD LIKE A CALL BACK    ADDITIONAL INFORMATION:     PATIENT REQUESTED A CALL BACK LATER THIS AFTERNOON WITH MORE INFORMATION REGARDING THE CALCIFICATION IN THE CAROTID ARTERIES OF HER NECK AND WHAT IS INVOLVED FOR THE CT ANGIOGRAM  "

## 2025-02-03 NOTE — TELEPHONE ENCOUNTER
Lvm to return call      RELAY   ----- Message from Marlys Regan   No signs of fracture or degenerative disc disease.  Calcification in your carotid arteries of your neck needs further evaluation.  I am ordering a CT angiogram.
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
## 2025-02-04 ENCOUNTER — TELEPHONE (OUTPATIENT)
Dept: ORTHOPEDIC SURGERY | Facility: CLINIC | Age: 50
End: 2025-02-04
Payer: COMMERCIAL

## 2025-02-04 NOTE — TELEPHONE ENCOUNTER
CALLED AND LVM    RELAY    Marlys Regan MD     Schedule an office visit for anxiety if she would like to discuss treatment options.
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
## 2025-02-04 NOTE — TELEPHONE ENCOUNTER
Spoke with patient regarding disc of (L) foot from yesterday 02/03/2025. Patient stated she would like to get a copy to give to her Chiropractor. Please advise.

## 2025-02-04 NOTE — TELEPHONE ENCOUNTER
Spoke to patient and informed her that she could  a disc with her x-rays at our radiology department.     I informed her where the radiology department is located.    Patient verbalized understanding.    Deepika Watts MA

## 2025-02-07 ENCOUNTER — OFFICE VISIT (OUTPATIENT)
Age: 50
End: 2025-02-07
Payer: COMMERCIAL

## 2025-02-07 VITALS
WEIGHT: 134.13 LBS | HEART RATE: 97 BPM | SYSTOLIC BLOOD PRESSURE: 138 MMHG | DIASTOLIC BLOOD PRESSURE: 86 MMHG | OXYGEN SATURATION: 99 % | HEIGHT: 61 IN | BODY MASS INDEX: 25.32 KG/M2

## 2025-02-07 DIAGNOSIS — F41.8 SITUATIONAL ANXIETY: Primary | ICD-10-CM

## 2025-02-07 DIAGNOSIS — F41.0 PANIC ATTACKS: ICD-10-CM

## 2025-02-07 PROCEDURE — 99214 OFFICE O/P EST MOD 30 MIN: CPT | Performed by: FAMILY MEDICINE

## 2025-02-07 RX ORDER — HYDROXYZINE HYDROCHLORIDE 25 MG/1
12.5-5 TABLET, FILM COATED ORAL EVERY 6 HOURS PRN
Qty: 60 TABLET | Refills: 3 | Status: SHIPPED | OUTPATIENT
Start: 2025-02-07

## 2025-02-07 RX ORDER — ESCITALOPRAM OXALATE 5 MG/1
5 TABLET ORAL DAILY
Qty: 30 TABLET | Refills: 3 | Status: SHIPPED | OUTPATIENT
Start: 2025-02-07
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
## 2025-02-13 ENCOUNTER — TELEPHONE (OUTPATIENT)
Age: 50
End: 2025-02-13
Payer: COMMERCIAL

## 2025-02-13 DIAGNOSIS — I65.23 CALCIFICATION OF BOTH CAROTID ARTERIES: Primary | ICD-10-CM

## 2025-02-13 DIAGNOSIS — Z86.73 H/O: STROKE: ICD-10-CM

## 2025-02-13 NOTE — TELEPHONE ENCOUNTER
"  Caller: Rachana Patrick \"Donaldo\"     Relationship: [unfilled]     Best call back number: [phone]    What is your medical concern? PT IS AWARE THAT MRI AND CTA HAS BEEN DENIED, REQUEST TO HEAR BACK FROM SOMEONE ONCE THE ISSUES HAS BEEN RESOLVED  "

## 2025-02-13 NOTE — TELEPHONE ENCOUNTER
[employer] called. MRI Brain and CTA were denied.     For the MRI, they want notes showing how the headaches have gotten worse.   For the CTA, they want a doppler showing the blockage. The xray was submitted with the original request.     If you want to do a peer to peer:  [phone]  Tracking # [id number]

## 2025-02-14 ENCOUNTER — TELEPHONE (OUTPATIENT)
Age: 50
End: 2025-02-14
Payer: COMMERCIAL

## 2025-02-14 NOTE — TELEPHONE ENCOUNTER
If you have signs of infection, lethargy, abdominal pain, or vomiting I recommend evaluation at the emergency department    Follow-up with endocrinology office regarding management of diabetes.

## 2025-02-14 NOTE — TELEPHONE ENCOUNTER
LVM  RELAY    If you have signs of infection, lethargy, abdominal pain, or vomiting I recommend evaluation at the emergency department    Follow-up with endocrinology office regarding management of diabetes.

## 2025-02-14 NOTE — TELEPHONE ENCOUNTER
Patient called     Stated that her blood sugar went from 80 to 188\. Pt stated that there has been no medication change. Pt stated she has felt extreme fatigue and slept for 14 hours. She has not ate anything today. She has only had a flavored water. She is unsure why her sugars are this way

## 2025-02-14 NOTE — TELEPHONE ENCOUNTER
I have ordered a vascular ultrasound of the carotids since the CT and MRI was not approved by the insurance.

## 2025-02-14 NOTE — TELEPHONE ENCOUNTER
"Caller: Rachana Patrick \"Donaldo\"    Relationship: Self    Best call back number:      [phone] (Mobile)     What form or medical record are you requesting:     PATIENT STATED HER INSURANCE DENIED MRI, CT ANGIOGRAM    PATIENT STATED INSURANCE REQUIRES DOCUMENTATION THAT SUPPORTS THAT THERE IS 70% MORE BLOCKAGE EVEN THOUGH PATIENT FEELS FINE    PATIENT ALSO STATED INSURANCE REQUIRES DOCUMENTATION REFLECTING MRI/HEADACHES ARE WORSE AND NOT IMPROVING    How would you like to receive the form or medical records (pick-up, mail, fax):     PATIENT DID NOT HAVE INSURANCE FAX NUMBER    DOCUMENTATION IS TO BE FORWARDED TO INSURANCE    PATIENT REQUESTED A CALL BACK REGARDING HER DIFFICULTY STAYING AWAKE    PATIENT STATED HER BLOOD SUGAR APPROXIMATELY 40  "

## 2025-02-18 NOTE — TELEPHONE ENCOUNTER
LEFT DETAILED VOICEMAIL INFORMING THE PATIENT OF MESSAGE FROM PCP.   RELAY     If you have signs of infection, lethargy, abdominal pain, or vomiting I recommend evaluation at the emergency department     Follow-up with endocrinology office regarding management of diabetes.    FINAL ATTEMPT.

## 2025-02-24 NOTE — TELEPHONE ENCOUNTER
I changed the order to a vascular ultrasound which is scheduled for 3/5/2025.  I recommend contacting her endocrinologist regarding the low blood sugar.

## 2025-02-26 NOTE — TELEPHONE ENCOUNTER
Called to relay message no answer left a vm to return call.    RELAY  I changed the order to a vascular ultrasound which is scheduled for 3/5/2025.  I recommend contacting her endocrinologist regarding the low blood sugar.

## 2025-03-05 ENCOUNTER — HOSPITAL ENCOUNTER (OUTPATIENT)
Facility: HOSPITAL | Age: 50
Discharge: HOME | End: 2025-03-05
Admitting: FAMILY MEDICINE
Payer: COMMERCIAL

## 2025-03-05 ENCOUNTER — OFFICE VISIT (OUTPATIENT)
Dept: ORTHOPEDIC SURGERY | Facility: CLINIC | Age: 50
End: 2025-03-05
Payer: COMMERCIAL

## 2025-03-05 VITALS
DIASTOLIC BLOOD PRESSURE: 85 MMHG | BODY MASS INDEX: 25.3 KG/M2 | HEIGHT: 61 IN | SYSTOLIC BLOOD PRESSURE: 143 MMHG | WEIGHT: 134 LBS

## 2025-03-05 VITALS
HEIGHT: 61 IN | BODY MASS INDEX: 25.3 KG/M2 | SYSTOLIC BLOOD PRESSURE: 124 MMHG | DIASTOLIC BLOOD PRESSURE: 68 MMHG | WEIGHT: 134 LBS

## 2025-03-05 DIAGNOSIS — I65.23 CALCIFICATION OF BOTH CAROTID ARTERIES: ICD-10-CM

## 2025-03-05 DIAGNOSIS — Z86.73 H/O: STROKE: ICD-10-CM

## 2025-03-05 DIAGNOSIS — M79.672 LEFT FOOT PAIN: Primary | ICD-10-CM

## 2025-03-05 LAB
BH CV XLRA MEAS LEFT DIST CCA EDV: 29.8 CM/SEC
BH CV XLRA MEAS LEFT DIST CCA PSV: 87 CM/SEC
BH CV XLRA MEAS LEFT DIST ICA EDV: 27.4 CM/SEC
BH CV XLRA MEAS LEFT DIST ICA PSV: 64.2 CM/SEC
BH CV XLRA MEAS LEFT ICA/CCA RATIO: 1.41
BH CV XLRA MEAS LEFT MID CCA EDV: 26.7 CM/SEC
BH CV XLRA MEAS LEFT MID CCA PSV: 91.9 CM/SEC
BH CV XLRA MEAS LEFT MID ICA EDV: 31.4 CM/SEC
BH CV XLRA MEAS LEFT MID ICA PSV: 86.2 CM/SEC
BH CV XLRA MEAS LEFT PROX CCA EDV: 23.6 CM/SEC
BH CV XLRA MEAS LEFT PROX CCA PSV: 100 CM/SEC
BH CV XLRA MEAS LEFT PROX ECA PSV: 161.7 CM/SEC
BH CV XLRA MEAS LEFT PROX ICA EDV: 37.3 CM/SEC
BH CV XLRA MEAS LEFT PROX ICA PSV: 141 CM/SEC
BH CV XLRA MEAS LEFT PROX SCLA PSV: 100 CM/SEC
BH CV XLRA MEAS LEFT VERTEBRAL A EDV: 22.8 CM/SEC
BH CV XLRA MEAS LEFT VERTEBRAL A PSV: 65 CM/SEC
BH CV XLRA MEAS RIGHT DIST CCA EDV: 24.6 CM/SEC
BH CV XLRA MEAS RIGHT DIST CCA PSV: 67.8 CM/SEC
BH CV XLRA MEAS RIGHT DIST ICA EDV: 15.8 CM/SEC
BH CV XLRA MEAS RIGHT DIST ICA PSV: 45.9 CM/SEC
BH CV XLRA MEAS RIGHT ICA/CCA RATIO: 0.78
BH CV XLRA MEAS RIGHT MID CCA EDV: 28 CM/SEC
BH CV XLRA MEAS RIGHT MID CCA PSV: 75.8 CM/SEC
BH CV XLRA MEAS RIGHT MID ICA EDV: 30.2 CM/SEC
BH CV XLRA MEAS RIGHT MID ICA PSV: 74.9 CM/SEC
BH CV XLRA MEAS RIGHT PROX CCA EDV: 25.9 CM/SEC
BH CV XLRA MEAS RIGHT PROX CCA PSV: 96.4 CM/SEC
BH CV XLRA MEAS RIGHT PROX ECA PSV: 333 CM/SEC
BH CV XLRA MEAS RIGHT PROX ICA EDV: 20 CM/SEC
BH CV XLRA MEAS RIGHT PROX ICA PSV: 59.6 CM/SEC
BH CV XLRA MEAS RIGHT PROX SCLA PSV: 112 CM/SEC
BH CV XLRA MEAS RIGHT VERTEBRAL A EDV: 5.1 CM/SEC
BH CV XLRA MEAS RIGHT VERTEBRAL A PSV: 21.5 CM/SEC
LEFT ARM BP: NORMAL MMHG
RIGHT ARM BP: NORMAL MMHG

## 2025-03-05 PROCEDURE — 93880 EXTRACRANIAL BILAT STUDY: CPT
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
## 2025-03-06 PROBLEM — I65.23 ATHEROSCLEROSIS OF BOTH CAROTID ARTERIES: Status: ACTIVE | Noted: 2025-03-06

## 2025-03-11 RX ORDER — LOSARTAN POTASSIUM 100 MG/1
100 TABLET ORAL DAILY
Qty: 90 TABLET | Refills: 1 | OUTPATIENT
Start: 2025-03-11

## 2025-03-12 ENCOUNTER — OFFICE VISIT (OUTPATIENT)
Dept: NEUROLOGY | Facility: CLINIC | Age: 50
End: 2025-03-12
Payer: COMMERCIAL

## 2025-03-12 VITALS
OXYGEN SATURATION: 98 % | BODY MASS INDEX: 23.41 KG/M2 | HEIGHT: 61 IN | DIASTOLIC BLOOD PRESSURE: 62 MMHG | WEIGHT: 124 LBS | HEART RATE: 97 BPM | SYSTOLIC BLOOD PRESSURE: 106 MMHG

## 2025-03-12 DIAGNOSIS — Z86.73 HISTORY OF STROKE: Primary | ICD-10-CM

## 2025-03-12 DIAGNOSIS — Z86.73 H/O: STROKE: ICD-10-CM

## 2025-03-12 NOTE — PROGRESS NOTES
Subjective   Patient ID: Rachana Patrick is a 50 y.o. female     Chief Complaint   Patient presents with    hx of stroke    Eye Problem     Right eye spots    Memory Loss        History of Present Illness    50 y.o. female referred by Dr Jerel Agudelo for stroke      EGAN for several years.  Located on left side of head.  Trouble with L and right directions. Reading and number recognition.      MRI Brain, my review of films, 12/27/22 chronic L medial occipital lobe infarct   C U/S < 50%     Cardiac event monitor - NSR   Echo - EF 66 - 70%    Last evaluation Irma Cain APRN 4/13/23.    10/22/22 L PCA stroke.  OD visual disturbance.      10/31/22 extensive of L PCA stroke  DAPT, statin     11/14/22 L thalamic infarct     A1C 6.4     Past Medical History:   Diagnosis Date    Cerebrovascular accident (CVA), unspecified mechanism 10/22/2022    Closed fracture of left ankle 11/29/2023    CVA (cerebral vascular accident) 10/22/2022    Degenerative disc disease, lumbar     Diabetic retinopathy     Hyperlipidemia     Hypertension     Left-sided nontraumatic intracerebral hemorrhage of brainstem 11/15/2022    Thalamic infarct, acute 10/24/2022    Thalamic stroke 11/14/2022    Type 2 diabetes mellitus      Family History   Problem Relation Age of Onset    Diabetes Mother     Hypertension Mother     Heart attack Mother     Diabetes Father     Hypertension Father      Social History     Socioeconomic History    Marital status: Single   Tobacco Use    Smoking status: Some Days     Current packs/day: 0.50     Average packs/day: 0.5 packs/day for 15.0 years (7.5 ttl pk-yrs)     Types: Cigarettes     Passive exposure: Current    Smokeless tobacco: Never   Vaping Use    Vaping status: Never Used   Substance and Sexual Activity    Alcohol use: Yes     Comment: social    Drug use: Not Currently    Sexual activity: Defer       Review of Systems    Objective     Vitals:    03/12/25 1353   BP: 106/62   Pulse: 97   SpO2: 98%  "  Weight: 56.2 kg (124 lb)   Height: 154.9 cm (60.98\")       Neurological Exam  Mental Status  Awake, alert and oriented to person, place and time. Oriented to person, place and time. Speech is normal. Language is fluent with no aphasia. Attention and concentration are normal. Fund of knowledge is appropriate for level of education.    Cranial Nerves  CN II: Right homonymous hemianopsia. Right . Left homonymous hemianopsia.  CN III, IV, VI: Extraocular movements intact bilaterally. Pupils equal round and reactive to light bilaterally.  CN V: Facial sensation is normal.  CN VII: Full and symmetric facial movement.  CN IX, X: Palate elevates symmetrically  CN XI: Shoulder shrug strength is normal.  CN XII: Tongue midline without atrophy or fasciculations.    Motor   Strength is 5/5 throughout all four extremities.    Sensory  Sensation is intact to light touch, pinprick, vibration and proprioception in all four extremities.    Reflexes  Deep tendon reflexes are 2+ and symmetric in all four extremities.    Coordination    Finger-to-nose, rapid alternating movements and heel-to-shin normal bilaterally without dysmetria.    Gait  Normal casual, toe, heel and tandem gait.       Physical Exam  Eyes:      Extraocular Movements: Extraocular movements intact.      Pupils: Pupils are equal, round, and reactive to light.   Neurological:      Motor: Motor strength is normal.     Coordination: Coordination is intact.      Deep Tendon Reflexes: Reflexes are normal and symmetric.   Psychiatric:         Speech: Speech normal.         Hospital Outpatient Visit on 03/05/2025   Component Date Value Ref Range Status    Prox CCA PSV 03/05/2025 96.4  cm/sec Final    Prox CCA EDV 03/05/2025 25.9  cm/sec Final    Right Mid CCA PSV 03/05/2025 75.8  cm/sec Final    right Mid CCA EDV 03/05/2025 28.0  cm/sec Final    Dist CCA PSV 03/05/2025 67.8  cm/sec Final    Dist CCA EDV 03/05/2025 24.6  cm/sec Final    Prox ICA PSV 03/05/2025 59.6  cm/sec " Final    Prox ICA EDV 03/05/2025 20.0  cm/sec Final    Mid ICA PSV 03/05/2025 74.9  cm/sec Final    Mid ICA EDV 03/05/2025 30.2  cm/sec Final    Dist ICA PSV 03/05/2025 45.9  cm/sec Final    Dist ICA EDV 03/05/2025 15.8  cm/sec Final    Prox ECA PSV 03/05/2025 333.0  cm/sec Final    Vertebral A PSV 03/05/2025 21.5  cm/sec Final    Vertebral A EDV 03/05/2025 5.1  cm/sec Final    Prox SCLA PSV 03/05/2025 112.0  cm/sec Final    Prox CCA PSV 03/05/2025 100.0  cm/sec Final    Prox CCA EDV 03/05/2025 23.6  cm/sec Final    left Mid CCA PSV 03/05/2025 91.9  cm/sec Final    left Mid CCA EDV 03/05/2025 26.7  cm/sec Final    Dist CCA PSV 03/05/2025 87.0  cm/sec Final    Dist CCA EDV 03/05/2025 29.8  cm/sec Final    Prox ICA PSV 03/05/2025 141.0  cm/sec Final    Prox ICA EDV 03/05/2025 37.3  cm/sec Final    Mid ICA PSV 03/05/2025 86.2  cm/sec Final    Mid ICA EDV 03/05/2025 31.4  cm/sec Final    Dist ICA PSV 03/05/2025 64.2  cm/sec Final    Dist ICA EDV 03/05/2025 27.4  cm/sec Final    Prox ECA PSV 03/05/2025 161.7  cm/sec Final    Vertebral A PSV 03/05/2025 65.0  cm/sec Final    Vertebral A EDV 03/05/2025 22.8  cm/sec Final    Prox SCLA PSV 03/05/2025 100.0  cm/sec Final    ICA/CCA ratio 03/05/2025 0.78   Final    ICA/CCA ratio 03/05/2025 1.41   Final    Right arm BP 03/05/2025 143/85  mmHg Final    Left arm BP 03/05/2025 143/80  mmHg Final         Assessment & Plan     Problem List Items Addressed This Visit          Neuro    H/O: stroke    Current Assessment & Plan   R HH from L PCA stroke    Refer to SLP     Continue  mg daily,. Lipitor 40 mg daily           Other Visit Diagnoses         History of stroke    -  Primary    Relevant Orders    Ambulatory Referral to Speech Therapy for Evaluation & Treatment               No follow-ups on file.

## 2025-03-19 ENCOUNTER — OFFICE VISIT (OUTPATIENT)
Age: 50
End: 2025-03-19
Payer: COMMERCIAL

## 2025-03-19 VITALS
OXYGEN SATURATION: 98 % | DIASTOLIC BLOOD PRESSURE: 70 MMHG | WEIGHT: 125.06 LBS | SYSTOLIC BLOOD PRESSURE: 116 MMHG | HEART RATE: 96 BPM | BODY MASS INDEX: 23.61 KG/M2 | HEIGHT: 61 IN

## 2025-03-19 DIAGNOSIS — Z79.4 TYPE 2 DIABETES MELLITUS WITH DIABETIC NEUROPATHY, WITH LONG-TERM CURRENT USE OF INSULIN: ICD-10-CM

## 2025-03-19 DIAGNOSIS — I10 ESSENTIAL HYPERTENSION: ICD-10-CM

## 2025-03-19 DIAGNOSIS — F41.8 SITUATIONAL ANXIETY: ICD-10-CM

## 2025-03-19 DIAGNOSIS — E11.40 TYPE 2 DIABETES MELLITUS WITH DIABETIC NEUROPATHY, WITH LONG-TERM CURRENT USE OF INSULIN: ICD-10-CM

## 2025-03-19 DIAGNOSIS — F41.0 PANIC ATTACKS: ICD-10-CM

## 2025-03-19 DIAGNOSIS — Z00.00 WELL ADULT EXAM: Primary | ICD-10-CM

## 2025-03-19 DIAGNOSIS — Z86.73 HISTORY OF STROKE: ICD-10-CM

## 2025-03-19 DIAGNOSIS — Z23 NEED FOR VACCINATION: ICD-10-CM

## 2025-03-19 DIAGNOSIS — Z12.31 VISIT FOR SCREENING MAMMOGRAM: ICD-10-CM

## 2025-03-19 DIAGNOSIS — L71.0 PERIORAL DERMATITIS: ICD-10-CM

## 2025-03-19 DIAGNOSIS — E78.5 HYPERLIPIDEMIA LDL GOAL <70: ICD-10-CM

## 2025-03-19 DIAGNOSIS — Z12.11 SCREENING FOR MALIGNANT NEOPLASM OF COLON: ICD-10-CM

## 2025-03-19 DIAGNOSIS — Z12.4 SCREENING FOR CERVICAL CANCER: ICD-10-CM

## 2025-03-19 RX ORDER — ASPIRIN 325 MG
325 TABLET, DELAYED RELEASE (ENTERIC COATED) ORAL DAILY
Qty: 90 TABLET | Refills: 3 | Status: SHIPPED | OUTPATIENT
Start: 2025-03-19

## 2025-03-19 RX ORDER — ATORVASTATIN CALCIUM 40 MG/1
40 TABLET, FILM COATED ORAL NIGHTLY
Qty: 90 TABLET | Refills: 3 | Status: SHIPPED | OUTPATIENT
Start: 2025-03-19 | End: 2026-03-19

## 2025-03-19 RX ORDER — PREGABALIN 100 MG/1
CAPSULE ORAL
Qty: 21 CAPSULE | Refills: 0 | Status: SHIPPED | OUTPATIENT
Start: 2025-03-19 | End: 2025-04-02

## 2025-03-19 RX ORDER — LOSARTAN POTASSIUM 100 MG/1
100 TABLET ORAL DAILY
Qty: 90 TABLET | Refills: 1 | Status: SHIPPED | OUTPATIENT
Start: 2025-03-19

## 2025-03-19 NOTE — PROGRESS NOTES
Chief Complaint  Annual Exam    Subjective              Rachana Patrick presents to [facility] for   History of Present Illness  History of Present Illness  The patient is a 50-year-old female presenting for an annual physical exam.    She has declined the Pap smear today due to time constraints. She has not had a pneumonia vaccine in a long time.  She has been experiencing facial breakouts characterized by dark patches and severe itching for the past month. She has been using ear candles for earwax removal. She has lost weight and is concerned about excess skin, which has been present for over a year. 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    She has been on Lyrica for neuropathy for approximately 3 to 4 years but reports that it induces sleepiness and disrupts her sleep cycle. She reports feeling unwell upon waking, with confusion that worsens as the day progresses. This symptom has been present since her stroke, although not consistently. She also experiences days of extreme fatigue, where she either does not want to get out of bed or return to it. She does continue to have neuropathy symptoms uncontrolled by the Lyrica.  She is seeking an alternative medication. She has previously tried Cymbalta without success. Despite improved blood sugar control, she continues to experience nerve pain.    She is currently on losartan and atorvastatin and takes aspirin daily. She is requesting refills for these medications.      SOCIAL HISTORY  The patient smokes cigarettes when she is under stress but is vague about how many years she has been smoking.    FAMILY HISTORY  The patient reports a family history of diabetes affecting almost everyone in the family. Additionally, two uncles and a cousin have been recently diagnosed with some form of cancer, including lung cancer in a cousin who never smoked. No family history of breast  "cancer.        IMMUNIZATIONS  The patient is due for a pneumonia vaccine.    Objective   Vital Signs:   Vitals:    03/19/25 0822   BP: 116/70   BP Location: Left arm   Patient Position: Sitting   Cuff Size: Adult   Pulse: 96   SpO2: 98%   Weight: 56.7 kg (125 lb 1 oz)   Height: 154.9 cm (60.98\")     Body mass index is 23.64 kg/m².    BMI is within normal parameters. No other follow-up for BMI required.        The following portions of the patient's history were reviewed and updated as appropriate: allergies, current medications, past family history, past medical history, past social history, past surgical history, and problem list.      Physical Exam  Constitutional:       General: She is not in acute distress.     Appearance: She is not ill-appearing, toxic-appearing or diaphoretic.   HENT:      Right Ear: Tympanic membrane and ear canal normal.      Left Ear: Tympanic membrane and ear canal normal.      Nose: No congestion or rhinorrhea.      Mouth/Throat:      Mouth: Mucous membranes are moist.      Pharynx: No oropharyngeal exudate or posterior oropharyngeal erythema.      Comments: Edentulous  Eyes:      General:         Right eye: No discharge.         Left eye: No discharge.      Conjunctiva/sclera: Conjunctivae normal.   Neck:      Thyroid: No thyromegaly.   Cardiovascular:      Rate and Rhythm: Normal rate and regular rhythm.   Pulmonary:      Effort: Pulmonary effort is normal.      Breath sounds: Normal breath sounds.   Abdominal:      Palpations: Abdomen is soft. There is no hepatomegaly.      Tenderness: There is no abdominal tenderness.   Musculoskeletal:      Cervical back: Neck supple.   Lymphadenopathy:      Head:      Right side of head: No submandibular, preauricular or posterior auricular adenopathy.      Left side of head: No submandibular, preauricular or posterior auricular adenopathy.      Cervical: No cervical adenopathy.   Skin:     General: Skin is warm.      Findings: Rash (Erythematous " papules on chin with excoriations) present.      Comments: Excessive loose skin of abdomen   Neurological:      Mental Status: She is alert and oriented to person, place, and time.   Psychiatric:         Attention and Perception: She is inattentive.         Mood and Affect: Mood is not anxious, depressed or elated.         Speech: Speech is tangential.         Behavior: Behavior is agitated and hyperactive.         Thought Content: Thought content is not delusional.        Result Review :   The following data was reviewed by: Marlys Regan MD on 03/19/2025:  Common labs          7/1/2024    13:46 1/23/2025    13:22 1/23/2025    13:56   Common Labs   Glucose   77    BUN   16    Creatinine   0.63    Sodium   136    Potassium   3.9    Chloride   102    Calcium   10.3    Albumin   4.1    Total Bilirubin   0.3    Alkaline Phosphatase   125    AST (SGOT)   19    ALT (SGPT)   17    Total Cholesterol   117    Triglycerides   103    HDL Cholesterol   50    LDL Cholesterol    48    Hemoglobin A1C 6.6  6.4     Microalbumin, Urine   <1.2      A1C Last 3 Results          7/1/2024    13:46 1/23/2025    13:22   HGBA1C Last 3 Results   Hemoglobin A1C 6.6  6.4           Office Visit with Roni Adamson MD (03/05/2025)     Immunization History   Administered Date(s) Administered    Pneumococcal Conjugate 20-Valent (PCV20) 03/19/2025    Tdap 04/06/2020       Health Maintenance   Topic Date Due    Hepatitis B (1 of 3 - 19+ 3-dose series) Never done    MAMMOGRAM  Never done    COLORECTAL CANCER SCREENING  Never done    ANNUAL PHYSICAL  Never done    PAP SMEAR  Never done    ZOSTER VACCINE (1 of 2) Never done    COVID-19 Vaccine (1 - 2024-25 season) Never done    DIABETIC FOOT EXAM  01/23/2025    INFLUENZA VACCINE  03/31/2025 (Originally 7/1/2024)    HEMOGLOBIN A1C  07/23/2025    LIPID PANEL  01/23/2026    URINE MICROALBUMIN-CREATININE RATIO (uACR)  01/24/2026    DIABETIC EYE EXAM  02/10/2026    TDAP/TD VACCINES (2 - Td or Tdap)  04/06/2030    HEPATITIS C SCREENING  Completed    Pneumococcal Vaccine 50+  Completed            Assessment and Plan    Diagnoses and all orders for this visit:    1. Well adult exam (Primary)    2. Screening for cervical cancer    3. Visit for screening mammogram  -     Mammo Screening Digital Tomosynthesis Bilateral With CAD; Future    4. Need for vaccination  -     Pneumococcal Conjugate Vaccine 20-Valent All    5. Screening for malignant neoplasm of colon  -     Ambulatory Referral For Screening Colonoscopy    6. Type 2 diabetes mellitus with diabetic neuropathy, with long-term current use of insulin  Comments:  Refilled Lyrica  Orders:  -     pregabalin (Lyrica) 100 MG capsule; Take 2 capsules by mouth Daily for 7 days, THEN 1 capsule Daily for 7 days.  Dispense: 21 capsule; Refill: 0  -     amitriptyline (ELAVIL) 25 MG tablet; Take 1 tablet by mouth Every Night. For neuropathy  Dispense: 90 tablet; Refill: 1  -     losartan (COZAAR) 100 MG tablet; Take 1 tablet by mouth Daily.  Dispense: 90 tablet; Refill: 1  -     atorvastatin (Lipitor) 40 MG tablet; Take 1 tablet by mouth Every Night.  Dispense: 90 tablet; Refill: 3    7. Perioral dermatitis  -     metroNIDAZOLE (METROCREAM) 0.75 % cream; Apply 1 Application topically to the appropriate area as directed 2 (Two) Times a Day. To face for up to 2 weeks  Dispense: 45 g; Refill: 2    8. History of stroke  -     atorvastatin (Lipitor) 40 MG tablet; Take 1 tablet by mouth Every Night.  Dispense: 90 tablet; Refill: 3  -     aspirin 325 MG EC tablet; Take 1 tablet by mouth Daily.  Dispense: 90 tablet; Refill: 3    9. Hyperlipidemia LDL goal <70  -     atorvastatin (Lipitor) 40 MG tablet; Take 1 tablet by mouth Every Night.  Dispense: 90 tablet; Refill: 3    10. Essential hypertension  -     losartan (COZAAR) 100 MG tablet; Take 1 tablet by mouth Daily.  Dispense: 90 tablet; Refill: 1        Assessment & Plan  1. Health maintenance.  She is overdue for colon cancer  screening. A mammogram has been recommended for breast cancer screening. She has been advised to undergo a colonoscopy for colon cancer screening. A Pap smear has been recommended but declined today. She has been advised against the use of Q-tips or ear candles for earwax removal. She has been informed that plastic surgery is the only solution for her excess skin concern. She will receive a pneumonia vaccine today due to her age and diabetes, which increase her risk for pneumonia complications.    2. Neuropathy.  She reports that Lyrica is not providing adequate relief and causes significant drowsiness. Alternative medications such as amitriptyline, Cymbalta, and gabapentin were discussed. Amitriptyline will be prescribed to be taken at night for nerve pain. The prescription will be sent to [facility]. Her Lyrica dosage will be gradually reduced until discontinuation.    3. Hypertension hyperlipidemia.  Continue losartan     4.  Hyperlipidemia.  LDL goal less than 70.  Continue atorvastatin    5. Facial dermatitis.  She reports dark patches and itching on her face that have persisted for the last month. A prescription for a topical cream will be provided to alleviate the itching and inflammation.    6.  History of stroke  She takes aspirin daily and will continue to do so indefinitely due to her history of stroke.    Follow-up  The patient will follow up in 3 months.    Counseling/anticipatory guidance:  mental health, immunizations, screenings      Follow Up   Return in about 3 months (around 6/19/2025) for Office visit neuropathy , HTN AND , Physical with Pap 1 year.  Patient was given instructions and counseling regarding her condition or for health maintenance advice. Please see specific information pulled into the AVS if appropriate.     Patient or patient representative verbalized consent for the use of Ambient Listening during the visit with  Marlys Regan MD for chart documentation.  3/19/2025  08:46 EDT     Electronically signed by Marlys Regan MD, 03/19/25, 10:34 AM EDT.

## 2025-03-19 NOTE — LETTER
[facility]  Vaccine Consent Form    Patient Name:  Rachana Patrick  Patient :  1975     Vaccine(s) Ordered    Pneumococcal Conjugate Vaccine 20-Valent All        Screening Checklist  The following questions should be completed prior to vaccination. If you answer “yes” to any question, it does not necessarily mean you should not be vaccinated. It just means we may need to clarify or ask more questions. If a question is unclear, please ask your healthcare provider to explain it.    Yes No   Any fever or moderate to severe illness today (mild illness and/or antibiotic treatment are not contraindications)?     Do you have a history of a serious reaction to any previous vaccinations, such as anaphylaxis, encephalopathy within 7 days, Guillain-Theodosia syndrome within 6 weeks, seizure?     Have you received any live vaccine(s) (e.g MMR, VANNESSA) or any other vaccines in the last month (to ensure duplicate doses aren't given)?     Do you have an anaphylactic allergy to latex (DTaP, DTaP-IPV, Hep A, Hep B, MenB, RV, Td, Tdap), baker’s yeast (Hep B, HPV), polysorbates (RSV, nirsevimab, PCV 20, Rotavirrus, Tdap, Shingrix), or gelatin (VANNESSA, MMR)?     Do you have an anaphylactic allergy to neomycin (Rabies, VANNESSA, MMR, IPV, Hep A), polymyxin B (IPV), or streptomycin (IPV)?      Any cancer, leukemia, AIDS, or other immune system disorder? (VANNESSA, MMR, RV)     Do you have a parent, brother, or sister with an immune system problem (if immune competence of vaccine recipient clinically verified, can proceed)? (MMR, VANNESSA)     Any recent steroid treatments for >2 weeks, chemotherapy, or radiation treatment? (VANNESSA, MMR)     Have you received antibody-containing blood transfusions or IVIG in the past 11 months (recommended interval is dependent on product)? (MMR, VANNESSA)     Have you taken antiviral drugs (acyclovir, famciclovir, valacyclovir for VANNESSA) in the last 24 or 48 hours, respectively?      Are you pregnant or planning to  "become pregnant within 1 month? (VANNESSA, MMR, HPV, IPV, MenB, Abrexvy; For Hep B- refer to Engerix-B; For RSV - Abrysvo is indicated for 32-36 weeks of pregnancy from September to January)     For infants, have you ever been told your child has had intussusception or a medical emergency involving obstruction of the intestine (Rotavirus)? If not for an infant, can skip this question.         *Ordering Physicians/APC should be consulted if \"yes\" is checked by the patient or guardian above.  I have received, read, and understand the Vaccine Information Statement (VIS) for each vaccine ordered.  I have considered my or my child's health status as well as the health status of my close contacts.  I have taken the opportunity to discuss my vaccine questions with my or my child's health care provider.   I have requested that the ordered vaccine(s) be given to me or my child.  I understand the benefits and risks of the vaccines.  I understand that I should remain in the clinic for 15 minutes after receiving the vaccine(s).  _________________________________________________________  Signature of Patient or Parent/Legal Guardian ____________________  Date     "

## 2025-03-20 RX ORDER — ESCITALOPRAM OXALATE 5 MG/1
5 TABLET ORAL DAILY
Qty: 90 TABLET | Refills: 2 | Status: SHIPPED | OUTPATIENT
Start: 2025-03-20

## 2025-03-20 NOTE — TELEPHONE ENCOUNTER
Rx Refill Note  Requested Prescriptions     Pending Prescriptions Disp Refills    escitalopram (LEXAPRO) 5 MG tablet [Pharmacy Med Name: ESCITALOPRAM 5MG TABLETS] 90 tablet      Sig: TAKE 1 TABLET BY MOUTH DAILY FOR ANXIETY      Last office visit with prescribing clinician: 3/19/2025   Last telemedicine visit with prescribing clinician: Visit date not found   Next office visit with prescribing clinician: 6/19/2025     Ann-Marie Blum MA  03/20/25, 10:50 EDT    Interaction

## 2025-03-31 ENCOUNTER — TELEPHONE (OUTPATIENT)
Dept: ORTHOPEDIC SURGERY | Facility: CLINIC | Age: 50
End: 2025-03-31
Payer: COMMERCIAL

## 2025-03-31 NOTE — TELEPHONE ENCOUNTER
Caller: Rachana Patrick    Relationship to patient: Self    Best call back number: [phone]    Chief complaint: LEFT FOOT     Type of visit: MRI AND FOLLOW UP     Requested date: ASAP      If rescheduling, when is the original appointment: 03/29/2025     Additional notes:PATIENT WAS UNAWARE OF THE APPT